# Patient Record
Sex: MALE | Race: WHITE | NOT HISPANIC OR LATINO | Employment: FULL TIME | URBAN - METROPOLITAN AREA
[De-identification: names, ages, dates, MRNs, and addresses within clinical notes are randomized per-mention and may not be internally consistent; named-entity substitution may affect disease eponyms.]

---

## 2017-01-03 DIAGNOSIS — I10 ESSENTIAL HYPERTENSION WITH GOAL BLOOD PRESSURE LESS THAN 140/90: Primary | ICD-10-CM

## 2017-01-03 RX ORDER — AMLODIPINE BESYLATE 5 MG/1
10 TABLET ORAL DAILY
Qty: 30 TABLET | Refills: 0 | Status: SHIPPED | OUTPATIENT
Start: 2017-01-03 | End: 2017-03-23

## 2017-01-03 NOTE — TELEPHONE ENCOUNTER
Amlodipine      Last Written Prescription Date: 12/02/16  Last Fill Quantity: 30, # refills: 1    Last Office Visit with G, P or [facility] prescribing provider:  10/31/16   Future Office Visit:        BP Readings from Last 3 Encounters:   11/30/16 136/86   11/09/16 123/89   10/31/16 132/94

## 2017-01-03 NOTE — Clinical Note
[facility]  [address] [address]  [phone]      January 5, 2017      Horacio Panda  [address] [address]        Horacio     Your medication has been approved for amLODIPine .    However, you are due for a follow up appointment for further refills. Please schedule this visit at your earliest convenience.    [facility]

## 2017-03-23 ENCOUNTER — OFFICE VISIT (OUTPATIENT)
Dept: FAMILY MEDICINE | Facility: CLINIC | Age: 52
End: 2017-03-23
Payer: COMMERCIAL

## 2017-03-23 VITALS
HEART RATE: 87 BPM | WEIGHT: 270 LBS | DIASTOLIC BLOOD PRESSURE: 86 MMHG | TEMPERATURE: 97.3 F | BODY MASS INDEX: 35.62 KG/M2 | SYSTOLIC BLOOD PRESSURE: 134 MMHG

## 2017-03-23 DIAGNOSIS — K21.9 GASTROESOPHAGEAL REFLUX DISEASE WITHOUT ESOPHAGITIS: ICD-10-CM

## 2017-03-23 DIAGNOSIS — E78.5 HYPERLIPIDEMIA LDL GOAL <100: ICD-10-CM

## 2017-03-23 DIAGNOSIS — I10 ESSENTIAL HYPERTENSION WITH GOAL BLOOD PRESSURE LESS THAN 140/90: Primary | ICD-10-CM

## 2017-03-23 DIAGNOSIS — L71.9 ROSACEA: ICD-10-CM

## 2017-03-23 PROCEDURE — 99214 OFFICE O/P EST MOD 30 MIN: CPT | Performed by: PHYSICIAN ASSISTANT

## 2017-03-23 RX ORDER — SIMVASTATIN 10 MG
10 TABLET ORAL AT BEDTIME
Qty: 90 TABLET | Refills: 1 | Status: CANCELLED | OUTPATIENT
Start: 2017-03-23

## 2017-03-23 RX ORDER — AMLODIPINE BESYLATE 10 MG/1
10 TABLET ORAL DAILY
Qty: 90 TABLET | Refills: 1 | Status: SHIPPED | OUTPATIENT
Start: 2017-03-23 | End: 2017-03-23

## 2017-03-23 RX ORDER — LOSARTAN POTASSIUM 100 MG/1
100 TABLET ORAL DAILY
Qty: 90 TABLET | Refills: 1 | Status: SHIPPED | OUTPATIENT
Start: 2017-03-23 | End: 2017-12-08

## 2017-03-23 RX ORDER — PRAVASTATIN SODIUM 20 MG
20 TABLET ORAL DAILY
Qty: 90 TABLET | Refills: 3 | Status: SHIPPED | OUTPATIENT
Start: 2017-03-23 | End: 2018-03-22

## 2017-03-23 RX ORDER — AMLODIPINE BESYLATE 5 MG/1
5 TABLET ORAL DAILY
Qty: 90 TABLET | Refills: 1 | Status: SHIPPED | OUTPATIENT
Start: 2017-03-23 | End: 2017-12-08

## 2017-03-23 NOTE — NURSING NOTE
"Chief Complaint   Patient presents with     Hypertension     Refill Request       Initial BP (!) 153/91  Pulse 87  Temp 97.3  F (36.3  C) (Oral)  Wt 270 lb (122.5 kg)  BMI 35.62 kg/m2 Estimated body mass index is 35.62 kg/(m^2) as calculated from the following:    Height as of 11/9/16: 6' 1\" (1.854 m).    Weight as of this encounter: 270 lb (122.5 kg).  Medication Reconciliation: complete    "

## 2017-03-23 NOTE — MR AVS SNAPSHOT
"              After Visit Summary   3/23/2017    Horacio Panda    MRN: [id number]           Patient Information     Date Of Birth          1965        Visit Information        Provider Department      3/23/2017 1:20 PM Kenton Richmond PA-C [facility] [place]        Today's Diagnoses     Essential hypertension with goal blood pressure less than 140/90    -  1    Hyperlipidemia LDL goal <100        Gastroesophageal reflux disease without esophagitis        Rosacea           Follow-ups after your visit        Who to contact     Normal or non-critical lab and imaging results will be communicated to you by [employer], letter or phone within 4 business days after the clinic has received the results. If you do not hear from us within 7 days, please contact the clinic through [employer] or phone. If you have a critical or abnormal lab result, we will notify you by phone as soon as possible.  Submit refill requests through [employer] or call your pharmacy and they will forward the refill request to us. Please allow 3 business days for your refill to be completed.          If you need to speak with a  for additional information , please call: [phone]             Additional Information About Your Visit        My[employer]Book[employer] Information     [employer] lets you send messages to your doctor, view your test results, renew your prescriptions, schedule appointments and more. To sign up, go to [email or web address] . Click on \"Log in\" on the left side of the screen, which will take you to the Welcome page. Then click on \"Sign up Now\" on the right side of the page.     You will be asked to enter the access code listed below, as well as some personal information. Please follow the directions to create your username and password.     Your access code is: [email or web address]  Expires: 2017  1:54 PM     Your access code will  in 90 days. If you need help or a new code, please call your [place] clinic or " [phone].        Care EveryWhere ID     This is your Care EveryWhere ID. This could be used by other organizations to access your [place] medical records  [id number]        Your Vitals Were     Pulse Temperature BMI (Body Mass Index)             87 97.3  F (36.3  C) (Oral) 35.62 kg/m2          Blood Pressure from Last 3 Encounters:   03/23/17 134/86   11/30/16 136/86   11/09/16 123/89    Weight from Last 3 Encounters:   03/23/17 270 lb (122.5 kg)   11/09/16 267 lb (121.1 kg)   10/31/16 267 lb (121.1 kg)              Today, you had the following     No orders found for display         Today's Medication Changes          These changes are accurate as of: 3/23/17  1:54 PM.  If you have any questions, ask your nurse or doctor.               Start taking these medicines.        Dose/Directions    amLODIPine 5 MG tablet   Commonly known as:  NORVASC   Used for:  Essential hypertension with goal blood pressure less than 140/90   Started by:  Kenton Richmond PA-C        Dose:  5 mg   Take 1 tablet (5 mg) by mouth daily   Quantity:  90 tablet   Refills:  1       metroNIDAZOLE 0.75 % cream   Commonly known as:  METROCREAM   Used for:  Rosacea   Started by:  Kenton Richmond PA-C        Apply topically 2 times daily   Quantity:  45 g   Refills:  11       pravastatin 20 MG tablet   Commonly known as:  PRAVACHOL   Used for:  Hyperlipidemia LDL goal <100   Started by:  Kenton Richmond PA-C        Dose:  20 mg   Take 1 tablet (20 mg) by mouth daily   Quantity:  90 tablet   Refills:  3       ranitidine 300 MG tablet   Commonly known as:  ZANTAC   Used for:  Gastroesophageal reflux disease without esophagitis   Started by:  Kenton Richmond PA-C        Dose:  300 mg   Take 1 tablet (300 mg) by mouth At Bedtime   Quantity:  60 tablet   Refills:  1         Stop taking these medicines if you haven't already. Please contact your care team if you have questions.     simvastatin 10 MG tablet   Commonly known as:  ZOCOR    Stopped by:  Kenton Richmond PA-C                Where to get your medicines      These medications were sent to [id number] - [place], [place] - [address]  [address] [place] [address]     Phone:  [phone]     amLODIPine 5 MG tablet    losartan 100 MG tablet    metroNIDAZOLE 0.75 % cream    pravastatin 20 MG tablet    ranitidine 300 MG tablet                Primary Care Provider Office Phone # Fax #    Kenton Richmond PA-C [phone]       [facility] [place] [address]  [place] [address]        Thank you!     Thank you for choosing [facility]  for your care. Our goal is always to provide you with excellent care. Hearing back from our patients is one way we can continue to improve our services. Please take a few minutes to complete the written survey that you may receive in the mail after your visit with us. Thank you!             Your Updated Medication List - Protect others around you: Learn how to safely use, store and throw away your medicines at www.disposemymeds.org.          This list is accurate as of: 3/23/17  1:54 PM.  Always use your most recent med list.                   Brand Name Dispense Instructions for use    amLODIPine 5 MG tablet    NORVASC    90 tablet    Take 1 tablet (5 mg) by mouth daily       losartan 100 MG tablet    COZAAR    90 tablet    Take 1 tablet (100 mg) by mouth daily       metroNIDAZOLE 0.75 % cream    METROCREAM    45 g    Apply topically 2 times daily       pravastatin 20 MG tablet    PRAVACHOL    90 tablet    Take 1 tablet (20 mg) by mouth daily       ranitidine 300 MG tablet    ZANTAC    60 tablet    Take 1 tablet (300 mg) by mouth At Bedtime

## 2017-03-23 NOTE — PROGRESS NOTES
SUBJECTIVE:                                                    Horacio Panda is a 51 year old male who presents to clinic today for the following health issues:      Hyperlipidemia Follow-Up      Rate your low fat/cholesterol diet?: good    Taking statin?  Yes, possible muscle aches from statin    Other lipid medications/supplements?:  none     Hypertension Follow-up      Outpatient blood pressures are being checked at pharmacy.  Results are slightly elevated.    Low Salt Diet: no added salt       Amount of exercise or physical activity: walks     Problems taking medications regularly: No    Medication side effects: patient is unsure    Diet: regular (no restrictions)      Ran out of amlodipine several wks ago.     Problem list and histories reviewed & adjusted, as indicated.  Additional history: as documented    Patient Active Problem List   Diagnosis     Status post hip replacement     CARDIOVASCULAR SCREENING; LDL GOAL LESS THAN 130     Hyperlipidemia LDL goal <130     Essential hypertension with goal blood pressure less than 140/90     Hyperplastic colon polyp     Gastroesophageal reflux disease without esophagitis     Past Surgical History:   Procedure Laterality Date     C ARTHROPLASTY I-P JT  Age 26    Lft Hip       Social History   Substance Use Topics     Smoking status: Never Smoker     Smokeless tobacco: Never Used     Alcohol use Yes      Comment: occ     Family History   Problem Relation Age of Onset     Hypertension Mother      CANCER Father      Hypertension Sister      CANCER Maternal Grandfather      CEREBROVASCULAR DISEASE Paternal Grandmother      HEART DISEASE Paternal Grandfather          BP Readings from Last 3 Encounters:   03/23/17 134/86   11/30/16 136/86   11/09/16 123/89    Wt Readings from Last 3 Encounters:   03/23/17 270 lb (122.5 kg)   11/09/16 267 lb (121.1 kg)   10/31/16 267 lb (121.1 kg)             gerd symptoms with occasional tiffany abd pain and n/v.   Recheck of rosacea          Reviewed and updated as needed this visit by clinical staff  Tobacco  Allergies  Meds  Med Hx  Surg Hx  Fam Hx  Soc Hx      Reviewed and updated as needed this visit by Provider  Tobacco           All other systems negative except as outline above    OBJECTIVE:  Eye exam - right eye normal lid, conjunctiva, cornea, pupil and fundus, left eye normal lid, conjunctiva, cornea, pupil and fundus.  Thyroid not palpable, not enlarged, no nodules detected.  CHEST:chest clear to IPPA, no tachypnea, retractions or cyanosis and S1, S2 normal, no murmur, no gallop, rate regular.  The abdomen is soft without tenderness, guarding, mass, rebound or organomegaly. Bowel sounds are normal. No CVA tenderness or inguinal adenopathy noted.  Horacio was seen today for hypertension and refill request.    Diagnoses and all orders for this visit:    Gastroesophageal reflux disease without esophagitis  -     ranitidine (ZANTAC) 300 MG tablet; Take 1 tablet (300 mg) by mouth At Bedtime    Hyperlipidemia LDL goal <100  -     pravastatin (PRAVACHOL) 20 MG tablet; Take 1 tablet (20 mg) by mouth daily    Essential hypertension with goal blood pressure less than 140/90  -     losartan (COZAAR) 100 MG tablet; Take 1 tablet (100 mg) by mouth daily  -     amLODIPine (NORVASC) 5 MG tablet; Take 1 tablet (10 mg) by mouth daily    Other orders  -     Cancel: simvastatin (ZOCOR) 10 MG tablet; Take 1 tablet (10 mg) by mouth At Bedtime      work on lifestyle modification  Recheck blood pressure in 1 mos .

## 2017-04-17 DIAGNOSIS — M10.9 ACUTE GOUTY ARTHRITIS: Primary | ICD-10-CM

## 2017-04-17 NOTE — TELEPHONE ENCOUNTER
Patient requesting medication for the pain in his foot. This is a reoccurring problem and states provider is aware of it. Please advise

## 2017-04-17 NOTE — TELEPHONE ENCOUNTER
He was seen in October for this and was given medrol dose pack , do you want to try it again or have him seen?  Med teed up

## 2017-04-18 ENCOUNTER — TELEPHONE (OUTPATIENT)
Dept: FAMILY MEDICINE | Facility: CLINIC | Age: 52
End: 2017-04-18

## 2017-04-18 ENCOUNTER — RADIANT APPOINTMENT (OUTPATIENT)
Dept: GENERAL RADIOLOGY | Facility: CLINIC | Age: 52
End: 2017-04-18
Attending: NURSE PRACTITIONER
Payer: COMMERCIAL

## 2017-04-18 ENCOUNTER — OFFICE VISIT (OUTPATIENT)
Dept: FAMILY MEDICINE | Facility: CLINIC | Age: 52
End: 2017-04-18
Payer: COMMERCIAL

## 2017-04-18 VITALS
WEIGHT: 267.4 LBS | HEART RATE: 95 BPM | DIASTOLIC BLOOD PRESSURE: 107 MMHG | BODY MASS INDEX: 34.32 KG/M2 | HEIGHT: 74 IN | TEMPERATURE: 98.7 F | OXYGEN SATURATION: 99 % | SYSTOLIC BLOOD PRESSURE: 161 MMHG

## 2017-04-18 DIAGNOSIS — M25.571 ACUTE RIGHT ANKLE PAIN: Primary | ICD-10-CM

## 2017-04-18 DIAGNOSIS — M25.571 ACUTE RIGHT ANKLE PAIN: ICD-10-CM

## 2017-04-18 LAB — URATE SERPL-MCNC: 6.6 MG/DL (ref 3.5–7.2)

## 2017-04-18 PROCEDURE — 36415 COLL VENOUS BLD VENIPUNCTURE: CPT | Performed by: NURSE PRACTITIONER

## 2017-04-18 PROCEDURE — 86038 ANTINUCLEAR ANTIBODIES: CPT | Performed by: NURSE PRACTITIONER

## 2017-04-18 PROCEDURE — 73610 X-RAY EXAM OF ANKLE: CPT | Mod: RT

## 2017-04-18 PROCEDURE — 99214 OFFICE O/P EST MOD 30 MIN: CPT | Performed by: NURSE PRACTITIONER

## 2017-04-18 PROCEDURE — 84550 ASSAY OF BLOOD/URIC ACID: CPT | Performed by: NURSE PRACTITIONER

## 2017-04-18 PROCEDURE — 86431 RHEUMATOID FACTOR QUANT: CPT | Performed by: NURSE PRACTITIONER

## 2017-04-18 RX ORDER — PREDNISONE 20 MG/1
40 TABLET ORAL DAILY
Qty: 14 TABLET | Refills: 0 | Status: SHIPPED | OUTPATIENT
Start: 2017-04-18 | End: 2017-04-25

## 2017-04-18 RX ORDER — METHYLPREDNISOLONE 4 MG
TABLET, DOSE PACK ORAL
Qty: 21 TABLET | Refills: 0 | Status: SHIPPED | OUTPATIENT
Start: 2017-04-18 | End: 2017-09-27

## 2017-04-18 NOTE — PATIENT INSTRUCTIONS
Suspected gout.  I will let you know your lab results.  I would make an appt with rheumatology due to your ongoing symptoms. Follow up as needed. Prednisone for resolution of symptoms.     Eating to Prevent Gout  Gout is a painful form of arthritis caused by an excess of uric acid. This is a waste product made by the body. It builds up in the body and forms crystals that collect in the joints, bringing on a gout attack. Alcohol and certain foods can trigger a gout attack. Below are some guidelines for changing your diet to help you manage gout. Your healthcare provider can work with you to determine the best eating plan for you. Know that diet is only one part of managing gout. Take your medicines as prescribed and follow the other guidelines your healthcare provider has given you.  Foods to limit  Eating too many foods containing purines may increase the levels of uric acid in your body and increase your risk for a gout attack. It may be best to limit these high-purine foods:    Alcohol (beer, red wine). You may be told to avoid alcohol completely.    Certain fish (anchovies, sardines, fish roes, herring, tuna, mussels, codfish, scallops, trout, and jessica)    Certain meats (red meat, processed meat, barron, turkey, wild game, and goose)    Sauces and gravies made with meat    Organ meats (such as liver, kidneys, sweetbreads, and tripe)    Legumes (such as dried beans, peas)    Mushrooms, spinach, asparagus, and cauliflower    Yeast and yeast extract supplements  Foods to try  Some foods may be helpful for people with gout. You may want to try adding some of the following foods to your diet:    Dark berries: These include blueberries, blackberries, and cherries. These berries contain chemicals that may lower uric acid.    Tofu: Tofu, which is made from soy, is a good source of protein. Studies have shown that it may be a better choice than meat for people with gout.    Omega fatty acids: These acids are found in  fatty fish (such as salmon), certain oils (such as flax, olive, or nut oils), or nuts. They may help prevent inflammation due to gout.  The following guidelines are recommended by the American Medical Association for people with gout. Your diet should be:    High in fiber, whole grains, fruits, and vegetables.    Low in protein (15% of calories should come from protein. Choose lean sources such as soy, lean meats, and poultry).    Low in fat (no more than 30% of calories should come from fat, with only 10% coming from animal fat).     4658-8543 "[employer]. [address] [place] [address]. All rights reserved. This information is not intended as a substitute for professional medical care. Always follow your healthcare professional's instructions.        Treating Gout Attacks  Gout is a disease that affects the joints. Left untreated, it can lead to painful foot deformity and even kidney problems. But, by treating gout early, you can relieve pain and help prevent future problems. Gout can usually be treated with medication and proper diet. In severe cases, surgery may be needed.  Gout attacks are painful and often happen more than once. Taking medications may reduce pain and prevent attacks in the future. There are also some things you can do at home to relieve symptoms.    Medications for Gout  Your health care provider may prescribe a daily medication to reduce levels of uric acid. This may help prevent gout attacks. Other medications can help relieve pain and swelling during an attack. Be sure to take your medication as directed.  What You Can Do  Below are some things you can do at home to relieve gout symptoms. Your health care provider may have other tips.    Rest the painful joint as much as you can.    Raise the painful joint so it is at a level higher than your heart.  How Can I Prevent Gout?  With a little effort, you may be able to prevent gout attacks in the future. Here are some  things you can do:    Avoid alcohol and foods that trigger gout.    Avoid foods high in purines    Certain meats (red meat, processed meat, turkey)    Organ meats (kidney, liver, sweetbread)    Shellfish (lobster, crab, shrimp, scallop, mussel)    Certain fish (anchovy, sardine, herring, mackerel)    Take any medications prescribed by your health care provider.    Lose weight if you need to.    Control blood pressure and cholesterol.    Drink plenty of water to help flush uric acid from your body.    7615-5036 [employer]. [address] [place] [address]. All rights reserved. This information is not intended as a substitute for professional medical care. Always follow your healthcare professional's instructions.        Gout    Gout or is an inflammation of a joint due to a build-up of gout crystals in the joint fluid. This occurs when there is an excess of uric acid (a normal waste product) in the body. Uric acid builds up in the body when the kidneys are unable to filter enough of it from the blood. This may occur with age. It is also associated with kidney disease. Gout occurs more often in persons with obesity, diabetes, hypertension, or high levels of fats in the blood. It may be run in families. Gout tends to come and go. A flare up of gout is called an attack. Drinking alcohol or eating certain foods (such as shellfish or foods with additives such as high-fructose corn syrup) may increase uric acid levels in the blood and cause a gout attack.  During a gout attack, the affected joint may become a hot, red, swollen and painful. If you have had one attack of gout, you are likely to have another. An attack of gout can be treated with medicine. If these attacks become frequent, a daily medicine may be prescribed to help the kidneys remove uric acid from the body.  Home care  During a gout attack:    Rest painful joints. If gout affects the joints of your foot or leg, you may want to use  crutches for the first few days to keep from bearing weight on the affected joint.    When sitting or lying down, raise the painful joint to a level higher than your heart.    Apply an ice pack (ice cubes in a plastic bag wrapped in a thin towel) over the injured area for 20 minutes every 1-2 hours the first day for pain relief. Continue this 3-4 times a day for swelling and pain.    Avoid alcohol and foods listed below (see Preventing attacks) during a gout attack. Drink extra fluid to help flush the uric acid through your kidneys.    If you were prescribed a medication to treat gout, take it as your healthcare provider has instructed. Don't skip doses.    Take anti-inflammatory medicine as directed.     If pain medicines have been prescribed, take them exactly as directed.    Preventing attacks    Minimize or avoid alcohol use. Excess alcohol intake can cause a gout attack.    Limit these foods and beverages:    Organ meats, such as kidneys and liver    Certain seafoods (anchovies, sardines, shrimp, scallops, herring, mackerel)    Wild game, meat extracts and meat gravies    Foods and beverages sweetened with high-fructose corn syrup, such as sodas    Eat a healthy diet including low-fat and nonfat dairy, whole grains, and vegetables.    If you are overweight, talk to your healthcare provider about a weight reduction plan. Avoid fasting or extreme low calorie diets (less than 900 calories per day). This will increase uric acid levels in the body.    If you have diabetes or high blood pressure, work with your doctor to manage these conditions.    Protect the joint from injury. Trauma can trigger a gout attack.  Follow-up care  Follow up with your healthcare provider or as advised.   When to seek medical advice  Call your healthcare provider if you have any of the following:    Fever over 100.4 F (38. C) with worsening joint pain    Increasing redness around the joint    Pain developing in another joint    Repeated  vomiting, abdominal pain, or blood in the vomit or stool (black or red color)    6264-8952 [employer]. [address] [place] [address], [place] [address]. All rights reserved. This information is not intended as a substitute for professional medical care. Always follow your healthcare professional's instructions.

## 2017-04-18 NOTE — TELEPHONE ENCOUNTER
savannah is due for a recheck. Have him make an appt to see me for a recheck of his blood pressure

## 2017-04-18 NOTE — TELEPHONE ENCOUNTER
LVM for patient to return call to clinic. Per PCP patient is due for a medication follow up. Please assist with scheduling.      Yelitza Jeong CMA

## 2017-04-18 NOTE — NURSING NOTE
"Chief Complaint   Patient presents with     Musculoskeletal Problem       Initial BP (!) 161/107  Pulse 95  Temp 98.7  F (37.1  C) (Oral)  Ht 6' 1.5\" (1.867 m)  Wt 267 lb 6.4 oz (121.3 kg)  SpO2 99%  BMI 34.8 kg/m2 Estimated body mass index is 34.8 kg/(m^2) as calculated from the following:    Height as of this encounter: 6' 1.5\" (1.867 m).    Weight as of this encounter: 267 lb 6.4 oz (121.3 kg).  Medication Reconciliation: complete     Sabi George MA  "

## 2017-04-18 NOTE — MR AVS SNAPSHOT
After Visit Summary   4/18/2017    Horacio Panda    MRN: [id number]           Patient Information     Date Of Birth          1965        Visit Information        Provider Department      4/18/2017 2:20 PM Frances Kiser NP [facility]        Today's Diagnoses     Acute right ankle pain    -  1      Care Instructions    Suspected gout.  I will let you know your lab results.  I would make an appt with rheumatology due to your ongoing symptoms. Follow up as needed. Prednisone for resolution of symptoms.     Eating to Prevent Gout  Gout is a painful form of arthritis caused by an excess of uric acid. This is a waste product made by the body. It builds up in the body and forms crystals that collect in the joints, bringing on a gout attack. Alcohol and certain foods can trigger a gout attack. Below are some guidelines for changing your diet to help you manage gout. Your healthcare provider can work with you to determine the best eating plan for you. Know that diet is only one part of managing gout. Take your medicines as prescribed and follow the other guidelines your healthcare provider has given you.  Foods to limit  Eating too many foods containing purines may increase the levels of uric acid in your body and increase your risk for a gout attack. It may be best to limit these high-purine foods:    Alcohol (beer, red wine). You may be told to avoid alcohol completely.    Certain fish (anchovies, sardines, fish roes, herring, tuna, mussels, codfish, scallops, trout, and jessica)    Certain meats (red meat, processed meat, barron, turkey, wild game, and goose)    Sauces and gravies made with meat    Organ meats (such as liver, kidneys, sweetbreads, and tripe)    Legumes (such as dried beans, peas)    Mushrooms, spinach, asparagus, and cauliflower    Yeast and yeast extract supplements  Foods to try  Some foods may be helpful for people with gout. You may want to try adding some of the  following foods to your diet:    Dark berries: These include blueberries, blackberries, and cherries. These berries contain chemicals that may lower uric acid.    Tofu: Tofu, which is made from soy, is a good source of protein. Studies have shown that it may be a better choice than meat for people with gout.    Omega fatty acids: These acids are found in fatty fish (such as salmon), certain oils (such as flax, olive, or nut oils), or nuts. They may help prevent inflammation due to gout.  The following guidelines are recommended by the American Medical Association for people with gout. Your diet should be:    High in fiber, whole grains, fruits, and vegetables.    Low in protein (15% of calories should come from protein. Choose lean sources such as soy, lean meats, and poultry).    Low in fat (no more than 30% of calories should come from fat, with only 10% coming from animal fat).     5136-7977 [employer]. [address] [place] [address]. All rights reserved. This information is not intended as a substitute for professional medical care. Always follow your healthcare professional's instructions.        Treating Gout Attacks  Gout is a disease that affects the joints. Left untreated, it can lead to painful foot deformity and even kidney problems. But, by treating gout early, you can relieve pain and help prevent future problems. Gout can usually be treated with medication and proper diet. In severe cases, surgery may be needed.  Gout attacks are painful and often happen more than once. Taking medications may reduce pain and prevent attacks in the future. There are also some things you can do at home to relieve symptoms.    Medications for Gout  Your health care provider may prescribe a daily medication to reduce levels of uric acid. This may help prevent gout attacks. Other medications can help relieve pain and swelling during an attack. Be sure to take your medication as directed.  What You  Can Do  Below are some things you can do at home to relieve gout symptoms. Your health care provider may have other tips.    Rest the painful joint as much as you can.    Raise the painful joint so it is at a level higher than your heart.  How Can I Prevent Gout?  With a little effort, you may be able to prevent gout attacks in the future. Here are some things you can do:    Avoid alcohol and foods that trigger gout.    Avoid foods high in purines    Certain meats (red meat, processed meat, turkey)    Organ meats (kidney, liver, sweetbread)    Shellfish (lobster, crab, shrimp, scallop, mussel)    Certain fish (anchovy, sardine, herring, mackerel)    Take any medications prescribed by your health care provider.    Lose weight if you need to.    Control blood pressure and cholesterol.    Drink plenty of water to help flush uric acid from your body.    5367-4037 [employer]. [address] [place] [address]. All rights reserved. This information is not intended as a substitute for professional medical care. Always follow your healthcare professional's instructions.        Gout    Gout or is an inflammation of a joint due to a build-up of gout crystals in the joint fluid. This occurs when there is an excess of uric acid (a normal waste product) in the body. Uric acid builds up in the body when the kidneys are unable to filter enough of it from the blood. This may occur with age. It is also associated with kidney disease. Gout occurs more often in persons with obesity, diabetes, hypertension, or high levels of fats in the blood. It may be run in families. Gout tends to come and go. A flare up of gout is called an attack. Drinking alcohol or eating certain foods (such as shellfish or foods with additives such as high-fructose corn syrup) may increase uric acid levels in the blood and cause a gout attack.  During a gout attack, the affected joint may become a hot, red, swollen and painful. If you  have had one attack of gout, you are likely to have another. An attack of gout can be treated with medicine. If these attacks become frequent, a daily medicine may be prescribed to help the kidneys remove uric acid from the body.  Home care  During a gout attack:    Rest painful joints. If gout affects the joints of your foot or leg, you may want to use crutches for the first few days to keep from bearing weight on the affected joint.    When sitting or lying down, raise the painful joint to a level higher than your heart.    Apply an ice pack (ice cubes in a plastic bag wrapped in a thin towel) over the injured area for 20 minutes every 1-2 hours the first day for pain relief. Continue this 3-4 times a day for swelling and pain.    Avoid alcohol and foods listed below (see Preventing attacks) during a gout attack. Drink extra fluid to help flush the uric acid through your kidneys.    If you were prescribed a medication to treat gout, take it as your healthcare provider has instructed. Don't skip doses.    Take anti-inflammatory medicine as directed.     If pain medicines have been prescribed, take them exactly as directed.    Preventing attacks    Minimize or avoid alcohol use. Excess alcohol intake can cause a gout attack.    Limit these foods and beverages:    Organ meats, such as kidneys and liver    Certain seafoods (anchovies, sardines, shrimp, scallops, herring, mackerel)    Wild game, meat extracts and meat gravies    Foods and beverages sweetened with high-fructose corn syrup, such as sodas    Eat a healthy diet including low-fat and nonfat dairy, whole grains, and vegetables.    If you are overweight, talk to your healthcare provider about a weight reduction plan. Avoid fasting or extreme low calorie diets (less than 900 calories per day). This will increase uric acid levels in the body.    If you have diabetes or high blood pressure, work with your doctor to manage these conditions.    Protect the joint  from injury. Trauma can trigger a gout attack.  Follow-up care  Follow up with your healthcare provider or as advised.   When to seek medical advice  Call your healthcare provider if you have any of the following:    Fever over 100.4 F (38. C) with worsening joint pain    Increasing redness around the joint    Pain developing in another joint    Repeated vomiting, abdominal pain, or blood in the vomit or stool (black or red color)    2479-0246 [employer]. [address] [place] [address]. All rights reserved. This information is not intended as a substitute for professional medical care. Always follow your healthcare professional's instructions.              Follow-ups after your visit        Additional Services     RHEUMATOLOGY REFERRAL       Your provider has referred you to: [facility]: [place] [facility] [phone]   [email or web address]  [facility]: [place] [facility] [phone]   [email or web address]  [facility]: [place] [facility] [phone]   [email or web address]  [facility]: [facility] [phone]   [email or web address]    Please be aware that coverage of these services is subject to the terms and limitations of your health insurance plan.  Call member services at your health plan with any benefit or coverage questions.      Please bring the following with you to your appointment:    (1) Any X-Rays, CTs or MRIs which have been performed.  Contact the facility where they were done to arrange for  prior to your scheduled appointment.    (2) List of current medications   (3) This referral request   (4) Any documents/labs given to you for this referral                  Follow-up notes from your care team     Return if symptoms worsen or fail to improve.      Who to contact     Normal or  "non-critical lab and imaging results will be communicated to you by MyChart, letter or phone within 4 business days after the clinic has received the results. If you do not hear from us within 7 days, please contact the clinic through [employer] or phone. If you have a critical or abnormal lab result, we will notify you by phone as soon as possible.  Submit refill requests through [employer] or call your pharmacy and they will forward the refill request to us. Please allow 3 business days for your refill to be completed.          If you need to speak with a  for additional information , please call: [phone]             Additional Information About Your Visit        [employer] Information     [employer] gives you secure access to your electronic health record. If you see a primary care provider, you can also send messages to your care team and make appointments. If you have questions, please call your primary care clinic.  If you do not have a primary care provider, please call [phone] and they will assist you.        Care EveryWhere ID     This is your Care EveryWhere ID. This could be used by other organizations to access your [facility] medical records  [id number]        Your Vitals Were     Pulse Temperature Height Pulse Oximetry BMI (Body Mass Index)       95 98.7  F (37.1  C) (Oral) 6' 1.5\" (1.867 m) 99% 34.8 kg/m2        Blood Pressure from Last 3 Encounters:   04/18/17 (!) 161/107   03/23/17 134/86   11/30/16 136/86    Weight from Last 3 Encounters:   04/18/17 267 lb 6.4 oz (121.3 kg)   03/23/17 270 lb (122.5 kg)   11/09/16 267 lb (121.1 kg)              We Performed the Following     Antinuclear antibody screen by EIA     Rheumatoid factor     RHEUMATOLOGY REFERRAL     Uric acid          Today's Medication Changes          These changes are accurate as of: 4/18/17  2:48 PM.  If you have any questions, ask your nurse or doctor.               Start taking these medicines.        Dose/Directions "    predniSONE 20 MG tablet   Commonly known as:  DELTASONE   Used for:  Acute right ankle pain   Started by:  Frances Kiser NP        Dose:  40 mg   Take 2 tablets (40 mg) by mouth daily for 7 days   Quantity:  14 tablet   Refills:  0            Where to get your medicines      These medications were sent to [id number] - [place], [place] - [address]  [address] [place] [address]     Phone:  [phone]     predniSONE 20 MG tablet                Primary Care Provider Office Phone # Fax #    Kenton Richmond PA-C [phone]       [facility] [place] [address]  [place] [address]        Thank you!     Thank you for choosing [facility]  for your care. Our goal is always to provide you with excellent care. Hearing back from our patients is one way we can continue to improve our services. Please take a few minutes to complete the written survey that you may receive in the mail after your visit with us. Thank you!             Your Updated Medication List - Protect others around you: Learn how to safely use, store and throw away your medicines at www.disposemymeds.org.          This list is accurate as of: 4/18/17  2:48 PM.  Always use your most recent med list.                   Brand Name Dispense Instructions for use    amLODIPine 5 MG tablet    NORVASC    90 tablet    Take 1 tablet (5 mg) by mouth daily       losartan 100 MG tablet    COZAAR    90 tablet    Take 1 tablet (100 mg) by mouth daily       methylPREDNISolone 4 MG tablet    MEDROL DOSEPAK    21 tablet    Follow package instructions       metroNIDAZOLE 0.75 % cream    METROCREAM    45 g    Apply topically 2 times daily       pravastatin 20 MG tablet    PRAVACHOL    90 tablet    Take 1 tablet (20 mg) by mouth daily       predniSONE 20 MG tablet    DELTASONE    14 tablet    Take 2 tablets (40 mg) by mouth daily for 7 days       ranitidine 300 MG tablet    ZANTAC    60 tablet    Take 1 tablet  (300 mg) by mouth At Bedtime

## 2017-04-18 NOTE — TELEPHONE ENCOUNTER
Spouse is calling stating patient was seen this morning and would like to know which RX she needs to  at her pharmacy for patient. Please call to advise. Thank  You.

## 2017-04-18 NOTE — PROGRESS NOTES
SUBJECTIVE:                                                    Horacio Panda is a 51 year old male who presents to clinic today for the following health issues:      Muscle/Joint Pain     Onset: Wednesday    Description:   Location: right foot  Character: Sharp, Dull ache, Stabbing, Gnawing, Burning and Cramping    Progression of Symptoms: worse    Accompanying Signs & Symptoms:  Other symptoms: swelling and redness, bruising in the heal, hot to touch    Precipitating factors:   Trauma or overuse: no     Alleviating factors:  Improved by: elevation  Therapies Tried and outcome: elevation, aleve   Hx of other joints swelling like this in the past, they had thought it was gout, but not confirmed- per pt.  Has resolved with prednisone in the past.          Problem list and histories reviewed & adjusted, as indicated.  Additional history: as documented    Patient Active Problem List   Diagnosis     Status post hip replacement     CARDIOVASCULAR SCREENING; LDL GOAL LESS THAN 130     Hyperlipidemia LDL goal <130     Essential hypertension with goal blood pressure less than 140/90     Hyperplastic colon polyp     Gastroesophageal reflux disease without esophagitis     Past Surgical History:   Procedure Laterality Date     C ARTHROPLASTY I-P JT  Age 26    Lft Hip       Social History   Substance Use Topics     Smoking status: Never Smoker     Smokeless tobacco: Never Used     Alcohol use Yes      Comment: occ     Family History   Problem Relation Age of Onset     Hypertension Mother      CANCER Father      Hypertension Sister      CANCER Maternal Grandfather      CEREBROVASCULAR DISEASE Paternal Grandmother      HEART DISEASE Paternal Grandfather          Current Outpatient Prescriptions   Medication Sig Dispense Refill     methylPREDNISolone (MEDROL DOSEPAK) 4 MG tablet Follow package instructions 21 tablet 0     predniSONE (DELTASONE) 20 MG tablet Take 2 tablets (40 mg) by mouth daily for 7 days 14 tablet 0     losartan  "(COZAAR) 100 MG tablet Take 1 tablet (100 mg) by mouth daily 90 tablet 1     pravastatin (PRAVACHOL) 20 MG tablet Take 1 tablet (20 mg) by mouth daily 90 tablet 3     ranitidine (ZANTAC) 300 MG tablet Take 1 tablet (300 mg) by mouth At Bedtime 60 tablet 1     amLODIPine (NORVASC) 5 MG tablet Take 1 tablet (5 mg) by mouth daily 90 tablet 1     metroNIDAZOLE (METROCREAM) 0.75 % cream Apply topically 2 times daily 45 g 11     No Known Allergies  BP Readings from Last 3 Encounters:   04/18/17 (P) 122/84   03/23/17 134/86   11/30/16 136/86    Wt Readings from Last 3 Encounters:   04/18/17 267 lb 6.4 oz (121.3 kg)   03/23/17 270 lb (122.5 kg)   11/09/16 267 lb (121.1 kg)            Labs reviewed in EPIC    Reviewed and updated as needed this visit by clinical staff       Reviewed and updated as needed this visit by Provider         ROS:  Constitutional, HEENT, cardiovascular, pulmonary, GI, , musculoskeletal, neuro, skin, endocrine and psych systems are negative, except as otherwise noted.    OBJECTIVE:                                                    BP (P) 122/84  Pulse 95  Temp 98.7  F (37.1  C) (Oral)  Ht 6' 1.5\" (1.867 m)  Wt 267 lb 6.4 oz (121.3 kg)  SpO2 99%  BMI 34.8 kg/m2  Body mass index is 34.8 kg/(m^2).  GENERAL: healthy, alert and no distress  RESP: lungs clear to auscultation - no rales, rhonchi or wheezes  CV: regular rate and rhythm, normal S1 S2, no S3 or S4, no murmur, click or rub, no peripheral edema and peripheral pulses strong  MS: no gross musculoskeletal defects noted POSITIVE for edema, erythema to R ankle. No pain with palpation.   SKIN: no suspicious lesions or rashes  NEURO: A&O, mentation intact and speech normal    Diagnostic Test Results:  See orders     ASSESSMENT/PLAN:                                                          ICD-10-CM    1. Acute right ankle pain M25.571 XR Ankle Right G/E 3 Views     Uric acid     Rheumatoid factor     Antinuclear antibody screen by EIA     " predniSONE (DELTASONE) 20 MG tablet     RHEUMATOLOGY REFERRAL    swelling; suspected gout; recurrent- multiple joints in past       See Patient Instructions: Suspected gout.  I will let you know your lab results.  I would make an appt with rheumatology due to your ongoing symptoms. Follow up as needed. Prednisone for resolution of symptoms.       Frances Kiser, RAZ  [facility]

## 2017-04-19 LAB
ANA SER QL IA: NORMAL
RHEUMATOID FACT SER NEPH-ACNC: <20 IU/ML (ref 0–20)

## 2017-04-20 NOTE — PROGRESS NOTES
Nigel Leonard,    Thank you for your recent office visit.    Here are your recent results.  All labs are normal. Please follow up if your symptoms persist or worsen.  Please schedule with rheumatology as we discussed for further evaluation of your symptoms.     Feel free to contact me via [employer] or call the clinic at [phone].    Sincerely,    Frances Kiser, KALINA, FNP-BC

## 2017-04-21 NOTE — TELEPHONE ENCOUNTER
PA for metroNIDAZOLE (METROCREAM) 0.75 % cream approved x 1 year, pharmacy informed. Approval sent to scanning.

## 2017-09-14 DIAGNOSIS — K21.9 GASTROESOPHAGEAL REFLUX DISEASE WITHOUT ESOPHAGITIS: ICD-10-CM

## 2017-09-14 NOTE — TELEPHONE ENCOUNTER
Ranitidine      Last Written Prescription Date: 05/22/17  Last Fill Quantity: 60,  # refills: 0   Last Office Visit with [facility], [facility] or [facility] prescribing provider: 04/18/17

## 2017-09-26 ENCOUNTER — TELEPHONE (OUTPATIENT)
Dept: FAMILY MEDICINE | Facility: CLINIC | Age: 52
End: 2017-09-26

## 2017-09-26 DIAGNOSIS — M10.9 ACUTE GOUTY ARTHRITIS: ICD-10-CM

## 2017-09-26 NOTE — TELEPHONE ENCOUNTER
Spouse calling states patient is having an inflammation flare up with swelling in L ankle. Declined appt would like a script called in for an oral steroid, please call to discuss.

## 2017-09-26 NOTE — TELEPHONE ENCOUNTER
Left message on identified voice mail for patient to call clinic. [phone].  Julienne Krause RN

## 2017-09-27 NOTE — TELEPHONE ENCOUNTER
Left message on voice mail for patient to call clinic. [phone]  Med pended for provider.  Will send to provider to advise in the meantime.  Please see below.

## 2017-09-30 RX ORDER — METHYLPREDNISOLONE 4 MG
TABLET, DOSE PACK ORAL
Qty: 21 TABLET | Refills: 0 | Status: SHIPPED | OUTPATIENT
Start: 2017-09-30 | End: 2018-03-27

## 2017-10-18 ENCOUNTER — TELEPHONE (OUTPATIENT)
Dept: FAMILY MEDICINE | Facility: CLINIC | Age: 52
End: 2017-10-18

## 2017-10-18 NOTE — TELEPHONE ENCOUNTER
Panel Management Review        Summary:    Patient is due/failing the following:   Lab draw and BP CHECK    Action needed:   Patient needs office visit for blood pressure monitoring with ANC nurse and blood work.    Type of outreach:    Phone, left message for patient to call back.     Questions for provider review:    None                                                                                                                                    Mack Villa MA       Chart routed to Care Team .

## 2017-10-18 NOTE — LETTER
November 2, 2017      Horacio Panda  [address] [address]        Dear Horacio,       This is a friendly reminder that you are due for a blood pressure recheck as it was elevated at your last visit.     Your health is important to us.  We periodically review your chart to make sure that you are up-to-date on all health maintenance and that your chronic illnesses are adequately controlled.       You can schedule a appointment with the nurse in the clinic-this is a no charge visit. Or do a walk in appointment with the pharmacy.     [facility]       You are also due for a fasting cholesterol check. Please schedule a lab only appointment.     Sincerely,        Kenton Richmond PA-C

## 2017-12-08 DIAGNOSIS — I10 ESSENTIAL HYPERTENSION WITH GOAL BLOOD PRESSURE LESS THAN 140/90: ICD-10-CM

## 2017-12-08 RX ORDER — LOSARTAN POTASSIUM 100 MG/1
TABLET ORAL
Qty: 30 TABLET | Refills: 0 | Status: SHIPPED | OUTPATIENT
Start: 2017-12-08 | End: 2018-03-22

## 2017-12-08 RX ORDER — AMLODIPINE BESYLATE 5 MG/1
TABLET ORAL
Qty: 30 TABLET | Refills: 0 | Status: SHIPPED | OUTPATIENT
Start: 2017-12-08 | End: 2018-03-22

## 2017-12-08 NOTE — TELEPHONE ENCOUNTER
savannah is due for a recheck. Have him make an appt to see me for a physical and fasting lab work

## 2017-12-08 NOTE — TELEPHONE ENCOUNTER
Routing refill request to provider for review/approval because:  Labs out of range:  Pended for approval.  Julienne Krause RN

## 2017-12-08 NOTE — LETTER
December 14, 2017        Horacio Panda  [address] [address]      Dear Horacio,    Your medication has been approved for losartan (COZAAR) 100 MG tablet, and amlodipine (NORVASC) 5 MG tablet for one month only.    However, you are due for a follow up appointment for further refills. Please schedule physical and fasting lab work at your earliest convenience.    Thank you.      Kenton [place]'s Care Team

## 2018-03-22 ENCOUNTER — MYC MEDICAL ADVICE (OUTPATIENT)
Dept: FAMILY MEDICINE | Facility: CLINIC | Age: 53
End: 2018-03-22

## 2018-03-22 DIAGNOSIS — E78.5 HYPERLIPIDEMIA LDL GOAL <100: ICD-10-CM

## 2018-03-22 DIAGNOSIS — I10 ESSENTIAL HYPERTENSION WITH GOAL BLOOD PRESSURE LESS THAN 140/90: ICD-10-CM

## 2018-03-23 RX ORDER — AMLODIPINE BESYLATE 5 MG/1
TABLET ORAL
Qty: 30 TABLET | Refills: 0 | Status: SHIPPED | OUTPATIENT
Start: 2018-03-23 | End: 2018-03-27

## 2018-03-23 RX ORDER — PRAVASTATIN SODIUM 20 MG
20 TABLET ORAL DAILY
Qty: 90 TABLET | Refills: 3 | Status: SHIPPED | OUTPATIENT
Start: 2018-03-23 | End: 2018-03-27

## 2018-03-23 RX ORDER — LOSARTAN POTASSIUM 100 MG/1
100 TABLET ORAL DAILY
Qty: 30 TABLET | Refills: 0 | Status: SHIPPED | OUTPATIENT
Start: 2018-03-23 | End: 2018-03-27

## 2018-03-27 ENCOUNTER — OFFICE VISIT (OUTPATIENT)
Dept: FAMILY MEDICINE | Facility: CLINIC | Age: 53
End: 2018-03-27
Payer: COMMERCIAL

## 2018-03-27 VITALS
HEIGHT: 74 IN | BODY MASS INDEX: 33.24 KG/M2 | DIASTOLIC BLOOD PRESSURE: 90 MMHG | RESPIRATION RATE: 16 BRPM | SYSTOLIC BLOOD PRESSURE: 138 MMHG | WEIGHT: 259 LBS | OXYGEN SATURATION: 99 % | HEART RATE: 92 BPM | TEMPERATURE: 98.5 F

## 2018-03-27 DIAGNOSIS — I10 ESSENTIAL HYPERTENSION WITH GOAL BLOOD PRESSURE LESS THAN 140/90: Primary | ICD-10-CM

## 2018-03-27 DIAGNOSIS — E78.5 HYPERLIPIDEMIA LDL GOAL <130: ICD-10-CM

## 2018-03-27 DIAGNOSIS — E78.5 HYPERLIPIDEMIA LDL GOAL <100: ICD-10-CM

## 2018-03-27 DIAGNOSIS — K21.9 GASTROESOPHAGEAL REFLUX DISEASE WITHOUT ESOPHAGITIS: ICD-10-CM

## 2018-03-27 LAB
ANION GAP SERPL CALCULATED.3IONS-SCNC: 11 MMOL/L (ref 3–14)
BUN SERPL-MCNC: 11 MG/DL (ref 7–30)
CALCIUM SERPL-MCNC: 9 MG/DL (ref 8.5–10.1)
CHLORIDE SERPL-SCNC: 106 MMOL/L (ref 94–109)
CHOLEST SERPL-MCNC: 176 MG/DL
CO2 SERPL-SCNC: 22 MMOL/L (ref 20–32)
CREAT SERPL-MCNC: 1.11 MG/DL (ref 0.66–1.25)
GFR SERPL CREATININE-BSD FRML MDRD: 69 ML/MIN/1.7M2
GLUCOSE SERPL-MCNC: 97 MG/DL (ref 70–99)
HDLC SERPL-MCNC: 45 MG/DL
LDLC SERPL CALC-MCNC: 98 MG/DL
NONHDLC SERPL-MCNC: 131 MG/DL
POTASSIUM SERPL-SCNC: 4.1 MMOL/L (ref 3.4–5.3)
SODIUM SERPL-SCNC: 139 MMOL/L (ref 133–144)
TRIGL SERPL-MCNC: 167 MG/DL

## 2018-03-27 PROCEDURE — 36415 COLL VENOUS BLD VENIPUNCTURE: CPT | Performed by: PHYSICIAN ASSISTANT

## 2018-03-27 PROCEDURE — 99214 OFFICE O/P EST MOD 30 MIN: CPT | Performed by: PHYSICIAN ASSISTANT

## 2018-03-27 PROCEDURE — 80061 LIPID PANEL: CPT | Performed by: PHYSICIAN ASSISTANT

## 2018-03-27 PROCEDURE — 80048 BASIC METABOLIC PNL TOTAL CA: CPT | Performed by: PHYSICIAN ASSISTANT

## 2018-03-27 RX ORDER — AMLODIPINE BESYLATE 10 MG/1
TABLET ORAL
Qty: 90 TABLET | Refills: 1 | Status: SHIPPED | OUTPATIENT
Start: 2018-03-27 | End: 2018-10-29

## 2018-03-27 RX ORDER — LOSARTAN POTASSIUM 100 MG/1
100 TABLET ORAL DAILY
Qty: 90 TABLET | Refills: 1 | Status: SHIPPED | OUTPATIENT
Start: 2018-03-27 | End: 2018-10-29

## 2018-03-27 RX ORDER — PRAVASTATIN SODIUM 20 MG
20 TABLET ORAL DAILY
Qty: 90 TABLET | Refills: 3 | Status: SHIPPED | OUTPATIENT
Start: 2018-03-27 | End: 2018-10-29

## 2018-03-27 NOTE — PROGRESS NOTES
SUBJECTIVE:   Horacio Panda is a 52 year old male who presents to clinic today for the following health issues:      Hyperlipidemia Follow-Up       Rate your low fat/cholesterol diet?: not monitoring fat    Taking statin?  Yes, no muscle aches from statin    Other lipid medications/supplements?:  none    Hypertension Follow-up      Outpatient blood pressures are not being checked.    Low Salt Diet: not monitoring salt      Amount of exercise or physical activity: None    Problems taking medications regularly: No    Medication side effects: none    Diet: regular (no restrictions)      Has been feeling well overall.         Problem list and histories reviewed & adjusted, as indicated.  Additional history: as documented    BP Readings from Last 3 Encounters:   03/27/18 138/90   04/18/17 (P) 122/84   03/23/17 134/86    Wt Readings from Last 3 Encounters:   03/27/18 259 lb (117.5 kg)   04/18/17 267 lb 6.4 oz (121.3 kg)   03/23/17 270 lb (122.5 kg)                    Reviewed and updated as needed this visit by clinical staff  Tobacco  Allergies  Meds       Reviewed and updated as needed this visit by Provider         All other systems negative except as outline above  OBJECTIVE:  Eye exam - right eye normal lid, conjunctiva, cornea, pupil and fundus, left eye normal lid, conjunctiva, cornea, pupil and fundus.  Thyroid not palpable, not enlarged, no nodules detected.  CHEST:chest clear to IPPA, no tachypnea, retractions or cyanosis and S1, S2 normal, no murmur, no gallop, rate regular.  Foot exam - both sides normal; no swelling, tenderness or skin or vascular lesions. Color and temperature is normal. Sensation is intact. Peripheral pulses are palpable. Toenails are normal.    Horacio was seen today for hypertension and lipids.    Diagnoses and all orders for this visit:    Essential hypertension with goal blood pressure less than 140/90  -     Basic metabolic panel  (Ca, Cl, CO2, Creat, Gluc, K, Na, BUN)  -     losartan  (COZAAR) 100 MG tablet; Take 1 tablet (100 mg) by mouth daily  -     amLODIPine (NORVASC) 10 MG tablet; TAKE ONE TABLET BY MOUTH ONCE DAILY.    Hyperlipidemia LDL goal <130  -     Lipid panel reflex to direct LDL Fasting    Hyperlipidemia LDL goal <100  -     pravastatin (PRAVACHOL) 20 MG tablet; Take 1 tablet (20 mg) by mouth daily    Gastroesophageal reflux disease without esophagitis  -     ranitidine (ZANTAC) 300 MG tablet; Take 1 tablet (300 mg) by mouth At Bedtime      work on lifestyle modification  Recheck in 6 mos.

## 2018-03-27 NOTE — MR AVS SNAPSHOT
"              After Visit Summary   3/27/2018    Horacio Panda    MRN: [id number]           Patient Information     Date Of Birth          1965        Visit Information        Provider Department      3/27/2018 9:40 AM Kenton Richmond PA-C [facility] [place]        Today's Diagnoses     Essential hypertension with goal blood pressure less than 140/90    -  1    Hyperlipidemia LDL goal <130        Hyperlipidemia LDL goal <100        Gastroesophageal reflux disease without esophagitis           Follow-ups after your visit        Who to contact     Normal or non-critical lab and imaging results will be communicated to you by [employer], letter or phone within 4 business days after the clinic has received the results. If you do not hear from us within 7 days, please contact the clinic through [employer] or phone. If you have a critical or abnormal lab result, we will notify you by phone as soon as possible.  Submit refill requests through [employer] or call your pharmacy and they will forward the refill request to us. Please allow 3 business days for your refill to be completed.          If you need to speak with a  for additional information , please call: [phone]             Additional Information About Your Visit        MyChart Information     [employer] gives you secure access to your electronic health record. If you see a primary care provider, you can also send messages to your care team and make appointments. If you have questions, please call your primary care clinic.  If you do not have a primary care provider, please call [phone] and they will assist you.        Care EveryWhere ID     This is your Care EveryWhere ID. This could be used by other organizations to access your [place] medical records  [id number]        Your Vitals Were     Pulse Temperature Respirations Height Pulse Oximetry BMI (Body Mass Index)    92 98.5  F (36.9  C) (Tympanic) 16 6' 1.5\" (1.867 m) 99% 33.71 kg/m2 "       Blood Pressure from Last 3 Encounters:   03/27/18 138/90   04/18/17 (P) 122/84   03/23/17 134/86    Weight from Last 3 Encounters:   03/27/18 259 lb (117.5 kg)   04/18/17 267 lb 6.4 oz (121.3 kg)   03/23/17 270 lb (122.5 kg)              We Performed the Following     Basic metabolic panel  (Ca, Cl, CO2, Creat, Gluc, K, Na, BUN)     Lipid panel reflex to direct LDL Fasting          Today's Medication Changes          These changes are accurate as of 3/27/18 10:09 AM.  If you have any questions, ask your nurse or doctor.               These medicines have changed or have updated prescriptions.        Dose/Directions    amLODIPine 10 MG tablet   Commonly known as:  NORVASC   This may have changed:  medication strength   Used for:  Essential hypertension with goal blood pressure less than 140/90   Changed by:  Kenton Richmond PA-C        TAKE ONE TABLET BY MOUTH ONCE DAILY.   Quantity:  90 tablet   Refills:  1       ranitidine 300 MG tablet   Commonly known as:  ZANTAC   This may have changed:  See the new instructions.   Used for:  Gastroesophageal reflux disease without esophagitis   Changed by:  Kenton Richmond PA-C        Dose:  300 mg   Take 1 tablet (300 mg) by mouth At Bedtime   Quantity:  90 tablet   Refills:  3         Stop taking these medicines if you haven't already. Please contact your care team if you have questions.     methylPREDNISolone 4 MG tablet   Commonly known as:  MEDROL DOSEPAK   Stopped by:  Kenton Richmond PA-C           metroNIDAZOLE 0.75 % cream   Commonly known as:  METROCREAM   Stopped by:  Kenton Richmond PA-C                Where to get your medicines      These medications were sent to [id number] - [place], [place] - [address]  [address] [place] [address]     Phone:  [phone]     amLODIPine 10 MG tablet    losartan 100 MG tablet    pravastatin 20 MG tablet    ranitidine 300 MG tablet                Primary Care Provider Office Phone  # Fax #    Kenton Richmond PA-C [phone]       [address] [facility] [address]        Equal Access to Services     JOSÉ LUIS GUTIERREZ : Hadii aad ku hadasho Soomaali, waaxda luqadaha, qaybta kaalmada adeegyada, waxadelita gonzálesn ademilvia berry laFilibertochung iker. So [facility] [phone].    ATENCIÓN: Si habla español, tiene a harvey disposición servicios gratuitos de asistencia lingüística. Llame al [phone].    We comply with applicable federal civil rights laws and [place] laws. We do not discriminate on the basis of race, color, national origin, age, disability, sex, sexual orientation, or gender identity.            Thank you!     Thank you for choosing [facility]  for your care. Our goal is always to provide you with excellent care. Hearing back from our patients is one way we can continue to improve our services. Please take a few minutes to complete the written survey that you may receive in the mail after your visit with us. Thank you!             Your Updated Medication List - Protect others around you: Learn how to safely use, store and throw away your medicines at www.disposemymeds.org.          This list is accurate as of 3/27/18 10:09 AM.  Always use your most recent med list.                   Brand Name Dispense Instructions for use Diagnosis    amLODIPine 10 MG tablet    NORVASC    90 tablet    TAKE ONE TABLET BY MOUTH ONCE DAILY.    Essential hypertension with goal blood pressure less than 140/90       losartan 100 MG tablet    COZAAR    90 tablet    Take 1 tablet (100 mg) by mouth daily    Essential hypertension with goal blood pressure less than 140/90       pravastatin 20 MG tablet    PRAVACHOL    90 tablet    Take 1 tablet (20 mg) by mouth daily    Hyperlipidemia LDL goal <100       ranitidine 300 MG tablet    ZANTAC    90 tablet    Take 1 tablet (300 mg) by mouth At Bedtime    Gastroesophageal reflux disease without esophagitis

## 2018-04-10 ENCOUNTER — TELEPHONE (OUTPATIENT)
Dept: FAMILY MEDICINE | Facility: CLINIC | Age: 53
End: 2018-04-10

## 2018-04-10 NOTE — TELEPHONE ENCOUNTER
Panel Management Review  Summary:    Patient is due/failing the following:   BP CHECK with ancillary or pharmacy    Type of outreach:    Phone, left message for patient to call back.  and Sent iGisticst message.    Questions for provider review:    None                                                                                                                                    Sabi George MA       Chart routed to Care Team .

## 2018-04-18 ENCOUNTER — ALLIED HEALTH/NURSE VISIT (OUTPATIENT)
Dept: FAMILY MEDICINE | Facility: CLINIC | Age: 53
End: 2018-04-18
Payer: COMMERCIAL

## 2018-04-18 VITALS — DIASTOLIC BLOOD PRESSURE: 92 MMHG | SYSTOLIC BLOOD PRESSURE: 132 MMHG | HEART RATE: 76 BPM

## 2018-04-18 DIAGNOSIS — I10 ESSENTIAL HYPERTENSION WITH GOAL BLOOD PRESSURE LESS THAN 140/90: Primary | ICD-10-CM

## 2018-04-18 PROCEDURE — 99207 ZZC NO CHARGE NURSE ONLY: CPT | Performed by: PHYSICIAN ASSISTANT

## 2018-04-18 NOTE — MR AVS SNAPSHOT
After Visit Summary   4/18/2018    Horacio Panda    MRN: [id number]           Patient Information     Date Of Birth          1965        Visit Information        Provider Department      4/18/2018 8:20 AM Kenton Richmond PA-C [facility] [place]        Today's Diagnoses     Essential hypertension with goal blood pressure less than 140/90    -  1       Follow-ups after your visit        Who to contact     Normal or non-critical lab and imaging results will be communicated to you by Tes[employer], letter or phone within 4 business days after the clinic has received the results. If you do not hear from us within 7 days, please contact the clinic through Tes[employer] or phone. If you have a critical or abnormal lab result, we will notify you by phone as soon as possible.  Submit refill requests through [employer] or call your pharmacy and they will forward the refill request to us. Please allow 3 business days for your refill to be completed.          If you need to speak with a  for additional information , please call: [phone]             Additional Information About Your Visit        TesoraharFloq Information     [employer] gives you secure access to your electronic health record. If you see a primary care provider, you can also send messages to your care team and make appointments. If you have questions, please call your primary care clinic.  If you do not have a primary care provider, please call [phone] and they will assist you.        Care EveryWhere ID     This is your Care EveryWhere ID. This could be used by other organizations to access your [place] medical records  [id number]        Your Vitals Were     Pulse                   76            Blood Pressure from Last 3 Encounters:   04/18/18 (!) 132/92   03/27/18 138/90   04/18/17 (P) 122/84    Weight from Last 3 Encounters:   03/27/18 259 lb (117.5 kg)   04/18/17 267 lb 6.4 oz (121.3 kg)   03/23/17 270 lb (122.5 kg)               Today, you had the following     No orders found for display       Primary Care Provider Office Phone # Fax #    Kenton Richmond PA-C [phone]       [address]  [place] [address]        Equal Access to Services     [facility]: Hadii aad ku hadasho Soomaali, waaxda luqadaha, qaybta kaalmada adeegyada, waxay idiin hayaan adeeg kharash la'aan . So [facility] [phone].    ATENCIÓN: Si habla español, tiene a harvey disposición servicios gratuitos de asistencia lingüística. Llame al [phone].    We comply with applicable federal civil rights laws and [place] laws. We do not discriminate on the basis of race, color, national origin, age, disability, sex, sexual orientation, or gender identity.            Thank you!     Thank you for choosing [facility]  for your care. Our goal is always to provide you with excellent care. Hearing back from our patients is one way we can continue to improve our services. Please take a few minutes to complete the written survey that you may receive in the mail after your visit with us. Thank you!             Your Updated Medication List - Protect others around you: Learn how to safely use, store and throw away your medicines at www.disposemymeds.org.          This list is accurate as of 4/18/18 11:59 PM.  Always use your most recent med list.                   Brand Name Dispense Instructions for use Diagnosis    amLODIPine 10 MG tablet    NORVASC    90 tablet    TAKE ONE TABLET BY MOUTH ONCE DAILY.    Essential hypertension with goal blood pressure less than 140/90       losartan 100 MG tablet    COZAAR    90 tablet    Take 1 tablet (100 mg) by mouth daily    Essential hypertension with goal blood pressure less than 140/90       pravastatin 20 MG tablet    PRAVACHOL    90 tablet    Take 1 tablet (20 mg) by mouth daily    Hyperlipidemia LDL goal <100       ranitidine 300 MG tablet    ZANTAC    90 tablet    Take 1 tablet (300 mg) by mouth At  Bedtime    Gastroesophageal reflux disease without esophagitis

## 2018-04-18 NOTE — PROGRESS NOTES
Horacio Panda is enrolled/participating in the retail pharmacy Blood Pressure Goals Achievement Program (BPGAP).  Horacio Panda was evaluated at [facility] on April 18, 2018 at which time his blood pressure was:    BP Readings from Last 3 Encounters:   04/18/18 (!) 132/92   03/27/18 138/90   04/18/17 (P) 122/84     Reviewed lifestyle modifications for blood pressure control and reduction: including making healthy food choices, managing weight, getting regular exercise, smoking cessation, reducing alcohol consumption, monitoring blood pressure regularly.     Horacio Panda is not experiencing symptoms.    Follow-Up: BP is not at goal of < 140/90mmHg (patient 18+ years of age with or without diabetes), Recommended follow-up with PCP.  Routing to PCP for further review.    Recommendation to Provider: add another hypertension med to regimen    Horacio Panda was evaluated for enrollment into the PGEN study today.    Patient eligible for enrollment:  No  Patient interested in enrollment:  No    Completed by:   Binh Doyle, Pharm. D  [facility]  [phone]

## 2018-04-20 ENCOUNTER — TELEPHONE (OUTPATIENT)
Dept: FAMILY MEDICINE | Facility: CLINIC | Age: 53
End: 2018-04-20

## 2018-04-20 NOTE — TELEPHONE ENCOUNTER
Left message on voice mail for patient to call clinic. [phone]  To give below information from CC'd chart.  Kenton Barboza RN, PA-C (Physician Assistant - C)     Physician Assistant      His blood pressure was a little high but for now, Have savannah work on lifestyle changes (low salt, lower calorie diet and consistent exercise) to help lower his blood pressure over the next 2 mos and then side effects me for a recheck .      Progress Notes   Binh Doyle [facility] (Pharmacist)   Unsigned      Savannah Panda is enrolled/participating in the retail pharmacy Blood Pressure Goals Achievement Program (BPGAP).  Savannah Panda was evaluated at [facility] on April 18, 2018 at which time his blood pressure was:         BP Readings from Last 3 Encounters:   04/18/18 (!) 132/92   03/27/18 138/90   04/18/17 (P) 122/84      Reviewed lifestyle modifications for blood pressure control and reduction: including making healthy food choices, managing weight, getting regular exercise, smoking cessation, reducing alcohol consumption, monitoring blood pressure regularly.      Savannah Panda is not experiencing symptoms.     Follow-Up: BP is not at goal of < 140/90mmHg (patient 18+ years of age with or without diabetes), Recommended follow-up with PCP.  Routing to PCP for further review.     Recommendation to Provider: add another hypertension med to regimen     Savannah Panda was evaluated for enrollment into the PGEN study today

## 2018-04-20 NOTE — PROGRESS NOTES
His blood pressure was a little high but for now, Have savannah work on lifestyle changes (low salt, lower calorie diet and consistent exercise) to help lower his blood pressure over the next 2 mos and then side effects me for a recheck .

## 2018-04-23 NOTE — TELEPHONE ENCOUNTER
Pt came in 4/18/18. See note below. Sabi George, Kenton Desir PA-C (Physician Assistant - C)     Physician Assistant       His blood pressure was a little high but for now, Have savannah work on lifestyle changes (low salt, lower calorie diet and consistent exercise) to help lower his blood pressure over the next 2 mos and then see me for a recheck .

## 2018-09-12 DIAGNOSIS — I10 ESSENTIAL HYPERTENSION WITH GOAL BLOOD PRESSURE LESS THAN 140/90: ICD-10-CM

## 2018-09-12 NOTE — TELEPHONE ENCOUNTER
Per Kenton Richmond's note on 4/18/18:  Progress Notes   Kenton [place] PA-C (Physician Assistant - C)     Physician Assistant      His blood pressure was a little high but for now, Have savannah work on lifestyle changes (low salt, lower calorie diet and consistent exercise) to help lower his blood pressure over the next 2 mos and then side effects me for a recheck .            Routing refill request to provider for review/approval because:  Blood pressures not within parameters for RN rx refill protocol.  Ramonita Mcintosh RN on 9/12/2018 at 3:01 PM

## 2018-09-12 NOTE — TELEPHONE ENCOUNTER
"Requested Prescriptions   Pending Prescriptions Disp Refills     amLODIPine (NORVASC) 5 MG tablet [Pharmacy Med Name: AmLODIPine Besylate Oral Tablet 5 MG] 30 tablet 0    Last Written Prescription Date:  3/23/18  Last Fill Quantity: 30,  # refills: 0   Last office visit: 3/27/2018 with prescribing provider:  3/27/18 KETTY [place]   Future Office Visit:   Sig: TAKE ONE TABLET BY MOUTH ONCE DAILY.    Calcium Channel Blockers Protocol  Failed    9/12/2018  2:16 PM       Failed - Blood pressure under 140/90 in past 12 months    BP Readings from Last 3 Encounters:   04/18/18 (!) 132/92   03/27/18 138/90   04/18/17 (P) 122/84                Passed - Recent (12 mo) or future (30 days) visit within the authorizing provider's specialty    Patient had office visit in the last 12 months or has a visit in the next 30 days with authorizing provider or within the authorizing provider's specialty.  See \"Patient Info\" tab in inbasket, or \"Choose Columns\" in Meds & Orders section of the refill encounter.           Passed - Patient is age 18 or older       Passed - Normal serum creatinine on file in past 12 months    Recent Labs   Lab Test  03/27/18   1012   CR  1.11             "

## 2018-09-13 RX ORDER — AMLODIPINE BESYLATE 5 MG/1
TABLET ORAL
Qty: 30 TABLET | Refills: 0 | Status: SHIPPED | OUTPATIENT
Start: 2018-09-13 | End: 2018-10-29

## 2018-10-29 ENCOUNTER — OFFICE VISIT (OUTPATIENT)
Dept: FAMILY MEDICINE | Facility: CLINIC | Age: 53
End: 2018-10-29
Payer: COMMERCIAL

## 2018-10-29 VITALS
HEART RATE: 72 BPM | WEIGHT: 260 LBS | BODY MASS INDEX: 33.37 KG/M2 | TEMPERATURE: 98.1 F | OXYGEN SATURATION: 98 % | DIASTOLIC BLOOD PRESSURE: 88 MMHG | SYSTOLIC BLOOD PRESSURE: 130 MMHG | HEIGHT: 74 IN | RESPIRATION RATE: 18 BRPM

## 2018-10-29 DIAGNOSIS — E78.5 HYPERLIPIDEMIA LDL GOAL <100: ICD-10-CM

## 2018-10-29 DIAGNOSIS — I10 ESSENTIAL HYPERTENSION WITH GOAL BLOOD PRESSURE LESS THAN 140/90: Primary | ICD-10-CM

## 2018-10-29 DIAGNOSIS — Z23 IMMUNIZATION DUE: ICD-10-CM

## 2018-10-29 DIAGNOSIS — K21.9 GASTROESOPHAGEAL REFLUX DISEASE WITHOUT ESOPHAGITIS: ICD-10-CM

## 2018-10-29 PROCEDURE — 90472 IMMUNIZATION ADMIN EACH ADD: CPT | Performed by: PHYSICIAN ASSISTANT

## 2018-10-29 PROCEDURE — 90686 IIV4 VACC NO PRSV 0.5 ML IM: CPT | Performed by: PHYSICIAN ASSISTANT

## 2018-10-29 PROCEDURE — 99214 OFFICE O/P EST MOD 30 MIN: CPT | Mod: 25 | Performed by: PHYSICIAN ASSISTANT

## 2018-10-29 PROCEDURE — 90471 IMMUNIZATION ADMIN: CPT | Performed by: PHYSICIAN ASSISTANT

## 2018-10-29 PROCEDURE — 90750 HZV VACC RECOMBINANT IM: CPT | Performed by: PHYSICIAN ASSISTANT

## 2018-10-29 RX ORDER — PRAVASTATIN SODIUM 20 MG
20 TABLET ORAL DAILY
Qty: 90 TABLET | Refills: 3 | Status: SHIPPED | OUTPATIENT
Start: 2018-10-29 | End: 2019-09-24

## 2018-10-29 RX ORDER — AMLODIPINE BESYLATE 10 MG/1
TABLET ORAL
Qty: 90 TABLET | Refills: 1 | Status: SHIPPED | OUTPATIENT
Start: 2018-10-29 | End: 2019-06-28

## 2018-10-29 RX ORDER — LOSARTAN POTASSIUM 100 MG/1
100 TABLET ORAL DAILY
Qty: 90 TABLET | Refills: 1 | Status: SHIPPED | OUTPATIENT
Start: 2018-10-29 | End: 2019-07-23

## 2018-10-29 NOTE — PROGRESS NOTES
SUBJECTIVE:   Horacoi Panda is a 53 year old male who presents to clinic today for the following health issues:      Hyperlipidemia Follow-Up      Rate your low fat/cholesterol diet?: fair    Taking statin?  Yes, no muscle aches from statin    Other lipid medications/supplements?:  none  No chest pain/sob/palps.   Hypertension Follow-up      Outpatient blood pressures are not being checked.    Low Salt Diet: not monitoring salt      Amount of exercise or physical activity: None    Problems taking medications regularly: No    Medication side effects: none    Diet: low fat/cholesterol      Gastrointestinal symptoms      Duration: chronic    Description:           REFLUX SYMPTOMS - heartburn      Intensity:  mild    Accompanying signs and symptoms:  none    History  Previous {similar problem: YES  Previous evaluation:  none    Aggravating factors:  Alcohol and cream    Alleviating factors: nothing    Other Therapies tried: zantac with success          Problem list and histories reviewed & adjusted, as indicated.  Additional history: as documented    BP Readings from Last 3 Encounters:   10/29/18 130/88   04/18/18 (!) 132/92   03/27/18 138/90    Wt Readings from Last 3 Encounters:   10/29/18 260 lb (117.9 kg)   03/27/18 259 lb (117.5 kg)   04/18/17 267 lb 6.4 oz (121.3 kg)                    Reviewed and updated as needed this visit by clinical staff  Tobacco  Allergies  Meds  Problems  Med Hx  Surg Hx  Fam Hx  Soc Hx        Reviewed and updated as needed this visit by Provider  Tobacco  Allergies  Meds  Problems  Med Hx  Surg Hx  Fam Hx  Soc Hx          All other systems negative except as outline above  OBJECTIVE:  Eye exam - right eye normal lid, conjunctiva, cornea, pupil and fundus, left eye normal lid, conjunctiva, cornea, pupil and fundus.  Thyroid not palpable, not enlarged, no nodules detected.  CHEST:chest clear to IPPA, no tachypnea, retractions or cyanosis and S1, S2 normal, no murmur, no  gallop, rate regular.  The abdomen is soft without tenderness, guarding, mass, rebound or organomegaly. Bowel sounds are normal. No CVA tenderness or inguinal adenopathy noted.    Horacio was seen today for hypertension, lipids and gastrointestinal problem.    Diagnoses and all orders for this visit:    Essential hypertension with goal blood pressure less than 140/90  -     amLODIPine (NORVASC) 10 MG tablet; TAKE ONE TABLET BY MOUTH ONCE DAILY.  -     losartan (COZAAR) 100 MG tablet; Take 1 tablet (100 mg) by mouth daily  -     Basic metabolic panel  (Ca, Cl, CO2, Creat, Gluc, K, Na, BUN)    Hyperlipidemia LDL goal <100  -     pravastatin (PRAVACHOL) 20 MG tablet; Take 1 tablet (20 mg) by mouth daily    Gastroesophageal reflux disease without esophagitis  -     ranitidine (ZANTAC) 300 MG tablet; Take 1 tablet (300 mg) by mouth At Bedtime  -     ZOSTER VACCINE RECOMBINANT ADJUVANTED IM NJX    Immunization due  -     FLU VAC PRESRV FREE QUAD SPLIT VIR, IM (3+ YRS)  -     ADMIN 1st VACCINE      work on lifestyle modification  Recheck in 6 mos

## 2018-10-29 NOTE — MR AVS SNAPSHOT
After Visit Summary   10/29/2018    Horacio Panda    MRN: [id number]           Patient Information     Date Of Birth          1965        Visit Information        Provider Department      10/29/2018 12:00 PM Kenton Richmond PA-C [facility] [place]        Today's Diagnoses     Essential hypertension with goal blood pressure less than 140/90    -  1    Hyperlipidemia LDL goal <100        Gastroesophageal reflux disease without esophagitis        Immunization due           Follow-ups after your visit        Follow-up notes from your care team     Return in about 6 months (around 4/29/2019) for BP Recheck.      Who to contact     Normal or non-critical lab and imaging results will be communicated to you by Huupyhart, letter or phone within 4 business days after the clinic has received the results. If you do not hear from us within 7 days, please contact the clinic through [employer] System[employer] or phone. If you have a critical or abnormal lab result, we will notify you by phone as soon as possible.  Submit refill requests through eTobb or call your pharmacy and they will forward the refill request to us. Please allow 3 business days for your refill to be completed.          If you need to speak with a  for additional information , please call: [phone]             Additional Information About Your Visit        HuupyharLifeWave Information     eTobb gives you secure access to your electronic health record. If you see a primary care provider, you can also send messages to your care team and make appointments. If you have questions, please call your primary care clinic.  If you do not have a primary care provider, please call [phone] and they will assist you.        Care EveryWhere ID     This is your Care EveryWhere ID. This could be used by other organizations to access your [facility] medical records  [id number]        Your Vitals Were     Pulse Temperature Respirations Height Pulse Oximetry  "BMI (Body Mass Index)    72 98.1  F (36.7  C) (Tympanic) 18 6' 1.5\" (1.867 m) 98% 33.84 kg/m2       Blood Pressure from Last 3 Encounters:   10/29/18 130/88   04/18/18 (!) 132/92   03/27/18 138/90    Weight from Last 3 Encounters:   10/29/18 260 lb (117.9 kg)   03/27/18 259 lb (117.5 kg)   04/18/17 267 lb 6.4 oz (121.3 kg)              We Performed the Following     ADMIN 1st VACCINE     Basic metabolic panel  (Ca, Cl, CO2, Creat, Gluc, K, Na, BUN)     FLU VAC PRESRV FREE QUAD SPLIT VIR, IM (3+ YRS)     ZOSTER VACCINE RECOMBINANT ADJUVANTED IM NJX          Today's Medication Changes          These changes are accurate as of 10/29/18 12:31 PM.  If you have any questions, ask your nurse or doctor.               These medicines have changed or have updated prescriptions.        Dose/Directions    amLODIPine 10 MG tablet   Commonly known as:  NORVASC   This may have changed:    - medication strength  - Another medication with the same name was removed. Continue taking this medication, and follow the directions you see here.   Used for:  Essential hypertension with goal blood pressure less than 140/90   Changed by:  Kenton Richmond PA-C        TAKE ONE TABLET BY MOUTH ONCE DAILY.   Quantity:  90 tablet   Refills:  1            Where to get your medicines      These medications were sent to [id number] - [place], [place] - [address]  [address] [place] [address]     Phone:  [phone]     amLODIPine 10 MG tablet    losartan 100 MG tablet    pravastatin 20 MG tablet    ranitidine 300 MG tablet                Primary Care Provider Office Phone # Fax #    Kenton Richmond PA-C [phone] [address] [place] [address]        Equal Access to Services     ISAEL GUTIERREZ AH: Deepti douglas Sojoo, waaxda luqadaha, qaybta kaalmada adeegyadaniel, april dong. [facility] [phone].    ATENCIÓN: Si habla español, tiene a harvey disposición " servicios gratuitos de asistencia lingüística. Vik johansen [phone].    We comply with applicable federal civil rights laws and [place] laws. We do not discriminate on the basis of race, color, national origin, age, disability, sex, sexual orientation, or gender identity.            Thank you!     Thank you for choosing [facility]  for your care. Our goal is always to provide you with excellent care. Hearing back from our patients is one way we can continue to improve our services. Please take a few minutes to complete the written survey that you may receive in the mail after your visit with us. Thank you!             Your Updated Medication List - Protect others around you: Learn how to safely use, store and throw away your medicines at www.disposemymeds.org.          This list is accurate as of 10/29/18 12:31 PM.  Always use your most recent med list.                   Brand Name Dispense Instructions for use Diagnosis    amLODIPine 10 MG tablet    NORVASC    90 tablet    TAKE ONE TABLET BY MOUTH ONCE DAILY.    Essential hypertension with goal blood pressure less than 140/90       losartan 100 MG tablet    COZAAR    90 tablet    Take 1 tablet (100 mg) by mouth daily    Essential hypertension with goal blood pressure less than 140/90       pravastatin 20 MG tablet    PRAVACHOL    90 tablet    Take 1 tablet (20 mg) by mouth daily    Hyperlipidemia LDL goal <100       ranitidine 300 MG tablet    ZANTAC    90 tablet    Take 1 tablet (300 mg) by mouth At Bedtime    Gastroesophageal reflux disease without esophagitis

## 2019-06-21 NOTE — Clinical Note
Patient presents today with c/o painful area on lower buttock since Wednesday, became more painful as days went on. Pt states hse has a hx of hidradenitis suppurative.    Routing message to PCP for review -BP checked at pharmacy and noted to be above goal. Recommended patient follow-up with PCP.

## 2019-06-28 DIAGNOSIS — I10 ESSENTIAL HYPERTENSION WITH GOAL BLOOD PRESSURE LESS THAN 140/90: ICD-10-CM

## 2019-06-28 NOTE — TELEPHONE ENCOUNTER
Patient due for HTN follow up.   30 day veronique period fill pended for provider approval with reminder to patient to schedule appointment for further refills.   Please advise on veronique refill.     Amber Hughes RN, BSN, PHN

## 2019-06-28 NOTE — TELEPHONE ENCOUNTER
"Requested Prescriptions   Pending Prescriptions Disp Refills     amLODIPine (NORVASC) 10 MG tablet [Pharmacy Med Name: amLODIPine Besylate Oral Tablet 10 MG] 90 tablet 0     Sig: TAKE ONE TABLET BY MOUTH ONCE DAILY.  Last Written Prescription Date:  3/5/19  Last Fill Quantity: 90,  # refills: 0   Last office visit: 10/29/2018 with prescribing provider:  KETTY Richmond   Future Office Visit:         Calcium Channel Blockers Protocol  Failed - 6/28/2019  7:13 AM        Failed - Normal serum creatinine on file in past 12 months     Recent Labs   Lab Test 03/27/18  1012   CR 1.11             Passed - Blood pressure under 140/90 in past 12 months     BP Readings from Last 3 Encounters:   10/29/18 130/88   04/18/18 (!) 132/92   03/27/18 138/90                 Passed - Recent (12 mo) or future (30 days) visit within the authorizing provider's specialty     Patient had office visit in the last 12 months or has a visit in the next 30 days with authorizing provider or within the authorizing provider's specialty.  See \"Patient Info\" tab in inbasket, or \"Choose Columns\" in Meds & Orders section of the refill encounter.              Passed - Medication is active on med list        Passed - Patient is age 18 or older        "

## 2019-06-29 RX ORDER — AMLODIPINE BESYLATE 10 MG/1
TABLET ORAL
Qty: 30 TABLET | Refills: 0 | Status: SHIPPED | OUTPATIENT
Start: 2019-06-29 | End: 2019-09-06

## 2019-07-17 DIAGNOSIS — I10 ESSENTIAL HYPERTENSION WITH GOAL BLOOD PRESSURE LESS THAN 140/90: ICD-10-CM

## 2019-07-17 NOTE — TELEPHONE ENCOUNTER
"Requested Prescriptions   Pending Prescriptions Disp Refills     amLODIPine (NORVASC) 10 MG tablet [Pharmacy Med Name: amLODIPine Besylate Oral Tablet 10 MG] 90 tablet 0     Sig: TAKE ONE TABLET BY MOUTH ONCE DAILY.  Last Written Prescription Date:  3/5/19  Last Fill Quantity: 90,  # refills: 0   Last office visit: 10/29/2018 with prescribing provider:  KETTY Richmond   Future Office Visit:         Calcium Channel Blockers Protocol  Failed - 7/17/2019 11:16 AM        Failed - Normal serum creatinine on file in past 12 months     Recent Labs   Lab Test 03/27/18  1012   CR 1.11             Passed - Blood pressure under 140/90 in past 12 months     BP Readings from Last 3 Encounters:   10/29/18 130/88   04/18/18 (!) 132/92   03/27/18 138/90                 Passed - Recent (12 mo) or future (30 days) visit within the authorizing provider's specialty     Patient had office visit in the last 12 months or has a visit in the next 30 days with authorizing provider or within the authorizing provider's specialty.  See \"Patient Info\" tab in inbasket, or \"Choose Columns\" in Meds & Orders section of the refill encounter.              Passed - Medication is active on med list        Passed - Patient is age 18 or older        "

## 2019-07-17 NOTE — TELEPHONE ENCOUNTER
Routing refill request to provider for review/approval because:  An given x1 and patient did not follow up, please advise

## 2019-07-18 RX ORDER — AMLODIPINE BESYLATE 10 MG/1
TABLET ORAL
Qty: 30 TABLET | Refills: 0 | Status: SHIPPED | OUTPATIENT
Start: 2019-07-18 | End: 2020-08-20

## 2019-07-23 DIAGNOSIS — I10 ESSENTIAL HYPERTENSION WITH GOAL BLOOD PRESSURE LESS THAN 140/90: ICD-10-CM

## 2019-07-23 NOTE — TELEPHONE ENCOUNTER
Patient due for 6 month HTN follow up    30 day veronique period fill pended for provider approval with reminder to patient to schedule appointment for further refills.   Please advise on veronique refill.     Amber Hughes RN, BSN, PHN

## 2019-07-23 NOTE — TELEPHONE ENCOUNTER
"Requested Prescriptions   Pending Prescriptions Disp Refills     losartan (COZAAR) 100 MG tablet [Pharmacy Med Name: Losartan Potassium Oral Tablet 100 MG] 90 tablet 0     Sig: Take 1 tablet (100 mg) by mouth daily   Last Written Prescription Date:  3-5-19  Last Fill Quantity: 90,  # refills: 0   Last office visit: 10/29/2018 with prescribing provider:  10-29-18   Future Office Visit:      Angiotensin-II Receptors Failed - 7/23/2019  9:19 AM        Failed - Normal serum creatinine on file in past 12 months     Recent Labs   Lab Test 03/27/18  1012   CR 1.11             Failed - Normal serum potassium on file in past 12 months     Recent Labs   Lab Test 03/27/18  1012   POTASSIUM 4.1                    Passed - Blood pressure under 140/90 in past 12 months     BP Readings from Last 3 Encounters:   10/29/18 130/88   04/18/18 (!) 132/92   03/27/18 138/90                 Passed - Recent (12 mo) or future (30 days) visit within the authorizing provider's specialty     Patient had office visit in the last 12 months or has a visit in the next 30 days with authorizing provider or within the authorizing provider's specialty.  See \"Patient Info\" tab in inbasket, or \"Choose Columns\" in Meds & Orders section of the refill encounter.              Passed - Medication is active on med list        Passed - Patient is age 18 or older        "

## 2019-07-24 RX ORDER — LOSARTAN POTASSIUM 100 MG/1
100 TABLET ORAL DAILY
Qty: 30 TABLET | Refills: 0 | Status: SHIPPED | OUTPATIENT
Start: 2019-07-24 | End: 2019-09-06

## 2019-07-24 NOTE — TELEPHONE ENCOUNTER
savannah is due for a recheck. Have him make an appt to see me.for a recheck of his blood pressure and fasting lab work . Would recommend an annual physical

## 2019-09-06 ENCOUNTER — MYC REFILL (OUTPATIENT)
Dept: FAMILY MEDICINE | Facility: CLINIC | Age: 54
End: 2019-09-06

## 2019-09-06 DIAGNOSIS — I10 ESSENTIAL HYPERTENSION WITH GOAL BLOOD PRESSURE LESS THAN 140/90: ICD-10-CM

## 2019-09-06 NOTE — TELEPHONE ENCOUNTER
"Requested Prescriptions   Pending Prescriptions Disp Refills     amLODIPine (NORVASC) 10 MG tablet [Pharmacy Med Name: amLODIPine Besylate Oral Tablet 10 MG] 30 tablet 0     Sig: TAKE ONE TABLET BY MOUTH ONCE DAILY.  Last Written Prescription Date:  6/29/19  Last Fill Quantity: 30,  # refills: 0   Last office visit: 10/29/2018 with prescribing provider:  KETTY Richmond   Future Office Visit:   Next 5 appointments (look out 90 days)    Sep 16, 2019 11:55 AM CDT  Office Visit with Kenton Richmond PA-C  [facility] ([facility] [address] [address]  [phone]              Calcium Channel Blockers Protocol  Failed - 9/6/2019  9:44 AM        Failed - Normal serum creatinine on file in past 12 months     Recent Labs   Lab Test 03/27/18  1012   CR 1.11             Passed - Blood pressure under 140/90 in past 12 months     BP Readings from Last 3 Encounters:   10/29/18 130/88   04/18/18 (!) 132/92   03/27/18 138/90                 Passed - Recent (12 mo) or future (30 days) visit within the authorizing provider's specialty     Patient had office visit in the last 12 months or has a visit in the next 30 days with authorizing provider or within the authorizing provider's specialty.  See \"Patient Info\" tab in inbasket, or \"Choose Columns\" in Meds & Orders section of the refill encounter.              Passed - Medication is active on med list        Passed - Patient is age 18 or older        "

## 2019-09-06 NOTE — TELEPHONE ENCOUNTER
Routing refill request to provider for review/approval because:  An given x1 and patient did not follow up, please advise    Creatinine   Date Value Ref Range Status   03/27/2018 1.11 0.66 - 1.25 mg/dL Final     Amber Alvarez RN, BSN, PHN

## 2019-09-07 RX ORDER — AMLODIPINE BESYLATE 10 MG/1
TABLET ORAL
Qty: 30 TABLET | Refills: 0 | Status: SHIPPED | OUTPATIENT
Start: 2019-09-07 | End: 2019-09-24

## 2019-09-07 RX ORDER — LOSARTAN POTASSIUM 100 MG/1
100 TABLET ORAL DAILY
Qty: 30 TABLET | Refills: 0 | Status: SHIPPED | OUTPATIENT
Start: 2019-09-07 | End: 2019-09-24

## 2019-09-24 ENCOUNTER — OFFICE VISIT (OUTPATIENT)
Dept: FAMILY MEDICINE | Facility: CLINIC | Age: 54
End: 2019-09-24
Payer: COMMERCIAL

## 2019-09-24 VITALS
SYSTOLIC BLOOD PRESSURE: 134 MMHG | WEIGHT: 266 LBS | TEMPERATURE: 97.6 F | OXYGEN SATURATION: 100 % | HEART RATE: 87 BPM | HEIGHT: 73 IN | BODY MASS INDEX: 35.25 KG/M2 | RESPIRATION RATE: 18 BRPM | DIASTOLIC BLOOD PRESSURE: 85 MMHG

## 2019-09-24 DIAGNOSIS — E66.01 MORBID OBESITY (H): ICD-10-CM

## 2019-09-24 DIAGNOSIS — K21.9 GASTROESOPHAGEAL REFLUX DISEASE WITHOUT ESOPHAGITIS: ICD-10-CM

## 2019-09-24 DIAGNOSIS — I10 ESSENTIAL HYPERTENSION WITH GOAL BLOOD PRESSURE LESS THAN 140/90: Primary | ICD-10-CM

## 2019-09-24 DIAGNOSIS — M71.30 MYXOID CYST: ICD-10-CM

## 2019-09-24 DIAGNOSIS — E78.5 HYPERLIPIDEMIA LDL GOAL <100: ICD-10-CM

## 2019-09-24 PROCEDURE — 20600 DRAIN/INJ JOINT/BURSA W/O US: CPT | Mod: RT | Performed by: PHYSICIAN ASSISTANT

## 2019-09-24 PROCEDURE — 99214 OFFICE O/P EST MOD 30 MIN: CPT | Mod: 25 | Performed by: PHYSICIAN ASSISTANT

## 2019-09-24 RX ORDER — AMLODIPINE BESYLATE 10 MG/1
10 TABLET ORAL DAILY
Qty: 90 TABLET | Refills: 1 | Status: SHIPPED | OUTPATIENT
Start: 2019-09-24 | End: 2020-08-20

## 2019-09-24 RX ORDER — LOSARTAN POTASSIUM 100 MG/1
100 TABLET ORAL DAILY
Qty: 90 TABLET | Refills: 1 | Status: SHIPPED | OUTPATIENT
Start: 2019-09-24 | End: 2020-08-04

## 2019-09-24 RX ORDER — PRAVASTATIN SODIUM 20 MG
20 TABLET ORAL DAILY
Qty: 90 TABLET | Refills: 3 | Status: SHIPPED | OUTPATIENT
Start: 2019-09-24 | End: 2022-04-05

## 2019-09-24 ASSESSMENT — MIFFLIN-ST. JEOR: SCORE: 2100.45

## 2019-09-24 NOTE — PROGRESS NOTES
"Subjective     Horacio Panda is a 54 year old male who presents to clinic today for the following health issues:    HPI   Hyperlipidemia Follow-Up      Are you having any of the following symptoms? (Select all that apply)  No complaints of shortness of breath, chest pain or pressure.  No increased sweating or nausea with activity.  No left-sided neck or arm pain.  No complaints of pain in calves when walking 1-2 blocks.    Are you regularly taking any medication or supplement to lower your cholesterol?   Yes- pravastatin 20mg daily    Are you having muscle aches or other side effects that you think could be caused by your cholesterol lowering medication?  No      Hypertension Follow-up      Do you check your blood pressure regularly outside of the clinic? No     Are you following a low salt diet? No    Are your blood pressures ever more than 140 on the top number (systolic) OR more   than 90 on the bottom number (diastolic), for example 140/90? No      How many servings of fruits and vegetables do you eat daily?  0-1    On average, how many sweetened beverages do you drink each day (soda, juice, sweet tea, etc)?   2    How many days per week do you miss taking your medication? 0            No Known Allergies  BP Readings from Last 3 Encounters:   09/24/19 134/85   10/29/18 130/88   04/18/18 (!) 132/92    Wt Readings from Last 3 Encounters:   09/24/19 120.7 kg (266 lb)   10/29/18 117.9 kg (260 lb)   03/27/18 117.5 kg (259 lb)                      Reviewed and updated as needed this visit by Provider         Review of Systems   ROS COMP: Constitutional, HEENT, cardiovascular, pulmonary, GI, , musculoskeletal, neuro, skin, endocrine and psych systems are negative, except as otherwise noted.      Objective    /85   Pulse 87   Temp 97.6  F (36.4  C) (Tympanic)   Resp 18   Ht 1.854 m (6' 1\")   Wt 120.7 kg (266 lb)   SpO2 100%   BMI 35.09 kg/m    Body mass index is 35.09 kg/m .  Physical Exam       Eye exam - " right eye normal lid, conjunctiva, cornea, pupil and fundus, left eye normal lid, conjunctiva, cornea, pupil and fundus.  Thyroid not palpable, not enlarged, no nodules detected.  CHEST:chest clear to IPPA, no tachypnea, retractions or cyanosis and S1, S2 normal, no murmur, no gallop, rate regular.  Right middle finger digital myxoid cyst present.    PROCEDURE:  JOINT ASPIRATION/INJECTION  After a discussion of risks, benefits and side effects of procedure, informed patient consent was obtained.  The right middle finger was prepped and draped in the usual clean fashion (sterile not required for this procedure).  0.5 cc of 1% lidocaine was used for local analgesia.    ASPIRATION: Not performed. (or)  Using a 16 gauge needle and 3 cc syringe, 0.5 cc of clear fluid was      aspirated without complications.            Horacio was seen today for hypertension.    Diagnoses and all orders for this visit:    Essential hypertension with goal blood pressure less than 140/90  -     Basic metabolic panel  (Ca, Cl, CO2, Creat, Gluc, K, Na, BUN)  -     amLODIPine (NORVASC) 10 MG tablet; Take 1 tablet (10 mg) by mouth daily  -     losartan (COZAAR) 100 MG tablet; Take 1 tablet (100 mg) by mouth daily    Hyperlipidemia LDL goal <100  -     pravastatin (PRAVACHOL) 20 MG tablet; Take 1 tablet (20 mg) by mouth daily  -     Lipid panel reflex to direct LDL Fasting; Future    Gastroesophageal reflux disease without esophagitis  -     ranitidine (ZANTAC) 300 MG tablet; Take 1 tablet (300 mg) by mouth At Bedtime    Myxoid cyst  -     DRAIN/INJECT SMALL JOINT/BURSA    Morbid obesity (H)      work on lifestyle modification  Recheck in 6 mos

## 2019-11-04 ENCOUNTER — HEALTH MAINTENANCE LETTER (OUTPATIENT)
Age: 54
End: 2019-11-04

## 2020-07-30 DIAGNOSIS — I10 ESSENTIAL HYPERTENSION WITH GOAL BLOOD PRESSURE LESS THAN 140/90: ICD-10-CM

## 2020-07-30 NOTE — TELEPHONE ENCOUNTER
Requested Prescriptions   Pending Prescriptions Disp Refills     losartan (COZAAR) 100 MG tablet 90 tablet 1     Sig: Take 1 tablet (100 mg) by mouth daily  Last Written Prescription Date:  2/25/20  Last Fill Quantity: 90,  # refills: 1   Last office visit: 9/24/2019 with prescribing provider:  KETTY Richmond   Future Office Visit:               There is no refill protocol information for this order

## 2020-08-04 NOTE — TELEPHONE ENCOUNTER
Routing refill request to provider for review/approval because:  Labs not current:  Creatinine, Potassium    Last OV with Kenton: 9/24/2019 with advised F/U in 6 months.    Meds pended with reminder due for appt. Please advise.    Ramonita Mcintosh, RN, BSN

## 2020-08-05 RX ORDER — LOSARTAN POTASSIUM 100 MG/1
100 TABLET ORAL DAILY
Qty: 30 TABLET | Refills: 0 | Status: SHIPPED | OUTPATIENT
Start: 2020-08-05 | End: 2020-08-20

## 2020-08-05 NOTE — TELEPHONE ENCOUNTER
savannah is due for a recheck. Have him make an appt to see me.for a face to face physical later this month.

## 2020-08-18 DIAGNOSIS — I10 ESSENTIAL HYPERTENSION WITH GOAL BLOOD PRESSURE LESS THAN 140/90: ICD-10-CM

## 2020-08-18 NOTE — TELEPHONE ENCOUNTER
Requested Prescriptions   Pending Prescriptions Disp Refills     amLODIPine (NORVASC) 10 MG tablet 90 tablet 1     Sig: Take 1 tablet (10 mg) by mouth daily   Last Written Prescription Date:  7-29-20  Last Fill Quantity: 90,  # refills: 1   Last office visit: 9/24/2019 with prescribing provider:  9-24-19   Future Office Visit:            There is no refill protocol information for this order

## 2020-08-21 RX ORDER — LOSARTAN POTASSIUM 100 MG/1
100 TABLET ORAL DAILY
Qty: 30 TABLET | Refills: 0 | Status: SHIPPED | OUTPATIENT
Start: 2020-08-21 | End: 2022-04-05

## 2020-08-21 RX ORDER — AMLODIPINE BESYLATE 10 MG/1
10 TABLET ORAL DAILY
Qty: 30 TABLET | Refills: 0 | Status: SHIPPED | OUTPATIENT
Start: 2020-08-21 | End: 2022-04-05

## 2020-08-21 NOTE — TELEPHONE ENCOUNTER
"Routing refill request to provider for review/approval because:  Labs not current:  Cr, K+  Patient needs to be seen because:  Pt overdue for follow up    Medication pended for approval, 30 day supply with reminder.   second reminder            Requested Prescriptions   Pending Prescriptions Disp Refills     losartan (COZAAR) 100 MG tablet [Pharmacy Med Name: Losartan Potassium Oral Tablet 100 MG] 30 tablet 0     Sig: Take 1 tablet (100 mg) by mouth daily --OVERDUE for follow up appointment, please schedule prior to further refills.       Angiotensin-II Receptors Failed - 8/18/2020 12:31 PM        Failed - Normal serum creatinine on file in past 12 months     Recent Labs   Lab Test 03/27/18  1012   CR 1.11       Ok to refill medication if creatinine is low          Failed - Normal serum potassium on file in past 12 months     Recent Labs   Lab Test 03/27/18  1012   POTASSIUM 4.1                    Passed - Last blood pressure under 140/90 in past 12 months     BP Readings from Last 3 Encounters:   09/24/19 134/85   10/29/18 130/88   04/18/18 (!) 132/92                 Passed - Recent (12 mo) or future (30 days) visit within the authorizing provider's specialty     Patient has had an office visit with the authorizing provider or a provider within the authorizing providers department within the previous 12 mos or has a future within next 30 days. See \"Patient Info\" tab in inbasket, or \"Choose Columns\" in Meds & Orders section of the refill encounter.              Passed - Medication is active on med list        Passed - Patient is age 18 or older             "

## 2020-08-21 NOTE — TELEPHONE ENCOUNTER
"Routing refill request to provider for review/approval because:  Labs not current:  cr  Patient needs to be seen because:  Overdue for follow up    Medication pended for approval, 30 day supply with reminder.                 Requested Prescriptions   Pending Prescriptions Disp Refills     amLODIPine (NORVASC) 10 MG tablet 90 tablet 1     Sig: Take 1 tablet (10 mg) by mouth daily       Calcium Channel Blockers Protocol  Failed - 8/18/2020 12:46 PM        Failed - Normal serum creatinine on file in past 12 months     Recent Labs   Lab Test 03/27/18  1012   CR 1.11       Ok to refill medication if creatinine is low          Passed - Blood pressure under 140/90 in past 12 months     BP Readings from Last 3 Encounters:   09/24/19 134/85   10/29/18 130/88   04/18/18 (!) 132/92                 Passed - Recent (12 mo) or future (30 days) visit within the authorizing provider's specialty     Patient has had an office visit with the authorizing provider or a provider within the authorizing providers department within the previous 12 mos or has a future within next 30 days. See \"Patient Info\" tab in inbasket, or \"Choose Columns\" in Meds & Orders section of the refill encounter.              Passed - Medication is active on med list        Passed - Patient is age 18 or older             "

## 2020-11-22 ENCOUNTER — HEALTH MAINTENANCE LETTER (OUTPATIENT)
Age: 55
End: 2020-11-22

## 2021-09-18 ENCOUNTER — HEALTH MAINTENANCE LETTER (OUTPATIENT)
Age: 56
End: 2021-09-18

## 2022-01-08 ENCOUNTER — HEALTH MAINTENANCE LETTER (OUTPATIENT)
Age: 57
End: 2022-01-08

## 2022-04-04 NOTE — PROGRESS NOTES
"  Assessment & Plan       ICD-10-CM    1. Essential hypertension with goal blood pressure less than 140/90  I10 BASIC METABOLIC PANEL     amLODIPine (NORVASC) 10 MG tablet     losartan (COZAAR) 100 MG tablet   2. High cholesterol  E78.00 Lipid panel reflex to direct LDL Fasting     pravastatin (PRAVACHOL) 20 MG tablet   3. Gastroesophageal reflux disease without esophagitis  K21.9 omeprazole (PRILOSEC) 20 MG DR capsule   4. Morbid obesity (H)  E66.01      medical conditions are stable. meds refilled.  Work on Healthy diet and exercise. Getting heart rate elevated for 30 mins most days of week.  Recheck yearly based on labs results.     BMI:   Estimated body mass index is 33.71 kg/m  as calculated from the following:    Height as of this encounter: 1.867 m (6' 1.5\").    Weight as of this encounter: 117.5 kg (259 lb).   Weight management plan: Discussed healthy diet and exercise guidelines      No follow-ups on file.    [facility]   Horacio is a 56 year old who presents for the following health issues     History of Present Illness       Hyperlipidemia:  He presents for follow up of hyperlipidemia.  He is taking medication to lower cholesterol. He is not having myalgia or other side effects to statin medications.    Hypertension: He presents for follow up of hypertension.  He does not check blood pressure  regularly outside of the clinic. Outpatient blood pressures have not been over 140/90. He does not follow a low salt diet.     He eats 4 or more servings of fruits and vegetables daily.He consumes 0 sweetened beverage(s) daily.He exercises with enough effort to increase his heart rate 9 or less minutes per day.  He exercises with enough effort to increase his heart rate 3 or less days per week.   He is taking medications regularly.     GERD stable on omeprazole.         Review of Systems   Constitutional, HEENT, cardiovascular, pulmonary, gi and gu systems " "are negative, except as otherwise noted.      Objective    /80   Pulse 78   Temp 97.1  F (36.2  C) (Tympanic)   Resp 18   Ht 1.867 m (6' 1.5\")   Wt 117.5 kg (259 lb)   SpO2 98%   BMI 33.71 kg/m    Body mass index is 33.71 kg/m .  Physical Exam   GENERAL: healthy, alert and no distress  EYES: Eyes grossly normal to inspection, PERRL and conjunctivae and sclerae normal  HENT: ear canals and TM's normal, nose and mouth without ulcers or lesions  NECK: no adenopathy, no asymmetry, masses, or scars and thyroid normal to palpation  RESP: lungs clear to auscultation - no rales, rhonchi or wheezes  CV: regular rate and rhythm, normal S1 S2, no S3 or S4, no murmur, click or rub, no peripheral edema and peripheral pulses strong  ABDOMEN: soft, nontender, no hepatosplenomegaly, no masses and bowel sounds normal  MS: no gross musculoskeletal defects noted, no edema  NEURO: Normal strength and tone, mentation intact and speech normal  PSYCH: mentation appears normal, affect normal/bright    Labs pending          "

## 2022-04-05 ENCOUNTER — OFFICE VISIT (OUTPATIENT)
Dept: FAMILY MEDICINE | Facility: CLINIC | Age: 57
End: 2022-04-05
Payer: COMMERCIAL

## 2022-04-05 VITALS
TEMPERATURE: 97.1 F | HEART RATE: 78 BPM | OXYGEN SATURATION: 98 % | RESPIRATION RATE: 18 BRPM | DIASTOLIC BLOOD PRESSURE: 80 MMHG | SYSTOLIC BLOOD PRESSURE: 134 MMHG | WEIGHT: 259 LBS | BODY MASS INDEX: 33.24 KG/M2 | HEIGHT: 74 IN

## 2022-04-05 DIAGNOSIS — E66.01 MORBID OBESITY (H): ICD-10-CM

## 2022-04-05 DIAGNOSIS — K21.9 GASTROESOPHAGEAL REFLUX DISEASE WITHOUT ESOPHAGITIS: ICD-10-CM

## 2022-04-05 DIAGNOSIS — E78.00 HIGH CHOLESTEROL: ICD-10-CM

## 2022-04-05 DIAGNOSIS — I10 ESSENTIAL HYPERTENSION WITH GOAL BLOOD PRESSURE LESS THAN 140/90: Primary | ICD-10-CM

## 2022-04-05 LAB
ANION GAP SERPL CALCULATED.3IONS-SCNC: 7 MMOL/L (ref 3–14)
BUN SERPL-MCNC: 11 MG/DL (ref 7–30)
CALCIUM SERPL-MCNC: 9.2 MG/DL (ref 8.5–10.1)
CHLORIDE BLD-SCNC: 104 MMOL/L (ref 94–109)
CHOLEST SERPL-MCNC: 173 MG/DL
CO2 SERPL-SCNC: 27 MMOL/L (ref 20–32)
CREAT SERPL-MCNC: 1.18 MG/DL (ref 0.66–1.25)
FASTING STATUS PATIENT QL REPORTED: YES
GFR SERPL CREATININE-BSD FRML MDRD: 72 ML/MIN/1.73M2
GLUCOSE BLD-MCNC: 109 MG/DL (ref 70–99)
HDLC SERPL-MCNC: 49 MG/DL
LDLC SERPL CALC-MCNC: 103 MG/DL
NONHDLC SERPL-MCNC: 124 MG/DL
POTASSIUM BLD-SCNC: 4 MMOL/L (ref 3.4–5.3)
SODIUM SERPL-SCNC: 138 MMOL/L (ref 133–144)
TRIGL SERPL-MCNC: 107 MG/DL

## 2022-04-05 PROCEDURE — 99214 OFFICE O/P EST MOD 30 MIN: CPT | Performed by: PHYSICIAN ASSISTANT

## 2022-04-05 PROCEDURE — 80061 LIPID PANEL: CPT | Performed by: PHYSICIAN ASSISTANT

## 2022-04-05 PROCEDURE — 80048 BASIC METABOLIC PNL TOTAL CA: CPT | Performed by: PHYSICIAN ASSISTANT

## 2022-04-05 PROCEDURE — 36415 COLL VENOUS BLD VENIPUNCTURE: CPT | Performed by: PHYSICIAN ASSISTANT

## 2022-04-05 RX ORDER — PRAVASTATIN SODIUM 20 MG
20 TABLET ORAL DAILY
Qty: 90 TABLET | Refills: 3 | Status: SHIPPED | OUTPATIENT
Start: 2022-04-05 | End: 2023-04-09

## 2022-04-05 RX ORDER — AMLODIPINE BESYLATE 10 MG/1
10 TABLET ORAL DAILY
Qty: 90 TABLET | Refills: 3 | Status: SHIPPED | OUTPATIENT
Start: 2022-04-05 | End: 2023-04-09

## 2022-04-05 RX ORDER — LOSARTAN POTASSIUM 100 MG/1
100 TABLET ORAL DAILY
Qty: 90 TABLET | Refills: 3 | Status: SHIPPED | OUTPATIENT
Start: 2022-04-05 | End: 2023-04-09

## 2022-04-05 NOTE — RESULT ENCOUNTER NOTE
Mr. Panda,    All of your labs were normal/near normal for you.    Please contact the clinic if you have additional questions.  Thank you.    Sincerely,    Олег Vizcarra PA-C

## 2022-06-06 ENCOUNTER — OFFICE VISIT (OUTPATIENT)
Dept: FAMILY MEDICINE | Facility: CLINIC | Age: 57
End: 2022-06-06
Payer: COMMERCIAL

## 2022-06-06 VITALS
TEMPERATURE: 98.5 F | BODY MASS INDEX: 33.79 KG/M2 | HEART RATE: 87 BPM | RESPIRATION RATE: 14 BRPM | SYSTOLIC BLOOD PRESSURE: 124 MMHG | WEIGHT: 259.6 LBS | DIASTOLIC BLOOD PRESSURE: 79 MMHG | OXYGEN SATURATION: 99 %

## 2022-06-06 DIAGNOSIS — S05.91XA RIGHT EYE INJURY, INITIAL ENCOUNTER: Primary | ICD-10-CM

## 2022-06-06 PROCEDURE — 99213 OFFICE O/P EST LOW 20 MIN: CPT | Performed by: PHYSICIAN ASSISTANT

## 2022-06-06 RX ORDER — ERYTHROMYCIN 5 MG/G
0.5 OINTMENT OPHTHALMIC
Qty: 12.5 G | Refills: 0 | Status: SHIPPED | OUTPATIENT
Start: 2022-06-06 | End: 2022-06-11

## 2022-06-06 ASSESSMENT — PAIN SCALES - GENERAL: PAINLEVEL: NO PAIN (0)

## 2022-06-06 NOTE — PROGRESS NOTES
Assessment & Plan   Problem List Items Addressed This Visit    None     Visit Diagnoses     Right eye injury, initial encounter    -  Primary    Relevant Medications    erythromycin (ROMYCIN) 5 MG/GM ophthalmic ointment    Other Relevant Orders    Adult Eye Referral         Scleral abrasion to the right eye without evidence of open globe injury or changes in vision. No evidence of hyphema, iritis, endophthalmitis or other worrisome process. Will treat with the above and he agreed to follow up with an eye provider in the next 1-2 days for a recheck, sooner prn.    Complete history and physical exam as below. AF with normal VS.    DDx and Dx discussed with and explained to the pt to their satisfaction.  All questions were answered at this time. Pt expressed understanding of and agreement with this dx, tx, and plan. No further workup warranted and standard medication warnings given. I have given the patient a list of pertinent indications for re-evaluation. Will go to the Emergency Department if symptoms worsen or new concerning symptoms arise. Patient left in no apparent distress.     23 minutes spent on the date of the encounter doing chart review, history and exam, documentation and further activities per the note     See Patient Instructions    Return in about 2 days (around 6/8/2022) for a recheck with an eye provider, or call 911/go to an ER anytime if worsening.    ABELINO Farrell  [facility] DARINEL Leonard is a 57 year old who presents for the following health issues  accompanied by his spouse.    HPI     Eye injury. Happened 6/5/22. Wood splinter in eye while chain sawing and chipping trees. It was bleeding. Has not done anything to it. Vision was blurry, but has since improved. The eye was weeping and producing fluid. Tetanus UTD.    Review of Systems   Constitutional, HEENT, cardiovascular, pulmonary, gi and gu systems are negative, except as otherwise noted.       Objective    There were no vitals taken for this visit.  There is no height or weight on file to calculate BMI.  Physical Exam  Vitals and nursing note reviewed.   Constitutional:       General: He is not in acute distress.     Appearance: Normal appearance. He is not diaphoretic.   HENT:      Head: Normocephalic and atraumatic.      Nose: Nose normal.   Eyes:      General: No scleral icterus.        Right eye: No discharge.         Left eye: No discharge.      Extraocular Movements: Extraocular movements intact.      Pupils: Pupils are equal, round, and reactive to light.      Comments: VA: R-20/20, L-20/20 and B-20/16. There is an area of injection, fluorescein dye uptake to the 8:00 position to the cornea and pupil. Negative Batool sign and no foreign body seen.   Pulmonary:      Effort: Pulmonary effort is normal. No respiratory distress.   Skin:     General: Skin is dry.      Coloration: Skin is not jaundiced or pale.   Neurological:      General: No focal deficit present.      Mental Status: He is alert. Mental status is at baseline.   Psychiatric:         Mood and Affect: Mood normal.         Behavior: Behavior normal.

## 2022-06-06 NOTE — PATIENT INSTRUCTIONS
Shaina Leonard,    Thank you for allowing [facility] to manage your care.    You have a scleral injury and I do not see a foreign body in the eye currently. Make an appointment with [facility] or the eye clinic of your choice for a recheck in the next 1-2 days.    If you develop worsening/changing symptoms at any time, please call 911 or go to the emergency department for evaluation.    Your tetanus was updated in 2020.    I sent your prescriptions to your pharmacy.    For your pain, please use Ibuprofen 400mg four times daily with food. Between ibuprofen doses, you may use Tylenol 650mg.     Max acetaminophen (Tylenol) 4,000mg/24 hours  Max ibuprofen 3,200mg/24 hours    If you have any questions or concerns, please feel free to call us at [phone]    Sincerely,    Cody Rodriguez PA-C    Did you know?      You can schedule a video visit for follow-up appointments as well as future appointments for certain conditions.  Please see the below link.     [email or web address]    If you have not already done so,  I encourage you to sign up for [facility] ([email or web address]).  This will allow you to review your results, securely communicate with a provider, and schedule virtual visits as well.

## 2022-11-20 ENCOUNTER — HEALTH MAINTENANCE LETTER (OUTPATIENT)
Age: 57
End: 2022-11-20

## 2023-04-10 ENCOUNTER — APPOINTMENT (OUTPATIENT)
Dept: CT IMAGING | Facility: HOSPITAL | Age: 58
End: 2023-04-10
Attending: EMERGENCY MEDICINE
Payer: COMMERCIAL

## 2023-04-10 ENCOUNTER — HOSPITAL ENCOUNTER (EMERGENCY)
Facility: HOSPITAL | Age: 58
Discharge: ANOTHER HOSPITAL | End: 2023-04-10
Attending: EMERGENCY MEDICINE | Admitting: EMERGENCY MEDICINE
Payer: COMMERCIAL

## 2023-04-10 VITALS
WEIGHT: 280.8 LBS | DIASTOLIC BLOOD PRESSURE: 96 MMHG | TEMPERATURE: 97.2 F | RESPIRATION RATE: 8 BRPM | HEIGHT: 73 IN | SYSTOLIC BLOOD PRESSURE: 149 MMHG | BODY MASS INDEX: 37.22 KG/M2 | OXYGEN SATURATION: 96 % | HEART RATE: 100 BPM

## 2023-04-10 DIAGNOSIS — I63.9 CEREBROVASCULAR ACCIDENT (CVA), UNSPECIFIED MECHANISM (H): ICD-10-CM

## 2023-04-10 LAB
ANION GAP SERPL CALCULATED.3IONS-SCNC: 16 MMOL/L (ref 7–15)
APTT PPP: 24 SECONDS (ref 22–38)
BASOPHILS # BLD AUTO: 0.1 10E3/UL (ref 0–0.2)
BASOPHILS NFR BLD AUTO: 1 %
BUN SERPL-MCNC: 14.2 MG/DL (ref 6–20)
CALCIUM SERPL-MCNC: 9.6 MG/DL (ref 8.6–10)
CHLORIDE SERPL-SCNC: 102 MMOL/L (ref 98–107)
CREAT SERPL-MCNC: 1.15 MG/DL (ref 0.67–1.17)
DEPRECATED HCO3 PLAS-SCNC: 22 MMOL/L (ref 22–29)
EOSINOPHIL # BLD AUTO: 0.3 10E3/UL (ref 0–0.7)
EOSINOPHIL NFR BLD AUTO: 3 %
ERYTHROCYTE [DISTWIDTH] IN BLOOD BY AUTOMATED COUNT: 11.9 % (ref 10–15)
GFR SERPL CREATININE-BSD FRML MDRD: 74 ML/MIN/1.73M2
GLUCOSE BLDC GLUCOMTR-MCNC: 133 MG/DL (ref 70–99)
GLUCOSE SERPL-MCNC: 138 MG/DL (ref 70–99)
HCT VFR BLD AUTO: 46.3 % (ref 40–53)
HGB BLD-MCNC: 16.5 G/DL (ref 13.3–17.7)
HOLD SPECIMEN: NORMAL
IMM GRANULOCYTES # BLD: 0 10E3/UL
IMM GRANULOCYTES NFR BLD: 1 %
INR PPP: 1.01 (ref 0.85–1.15)
LYMPHOCYTES # BLD AUTO: 1.8 10E3/UL (ref 0.8–5.3)
LYMPHOCYTES NFR BLD AUTO: 21 %
MCH RBC QN AUTO: 33.5 PG (ref 26.5–33)
MCHC RBC AUTO-ENTMCNC: 35.6 G/DL (ref 31.5–36.5)
MCV RBC AUTO: 94 FL (ref 78–100)
MONOCYTES # BLD AUTO: 0.8 10E3/UL (ref 0–1.3)
MONOCYTES NFR BLD AUTO: 9 %
NEUTROPHILS # BLD AUTO: 5.9 10E3/UL (ref 1.6–8.3)
NEUTROPHILS NFR BLD AUTO: 65 %
NRBC # BLD AUTO: 0 10E3/UL
NRBC BLD AUTO-RTO: 0 /100
PLATELET # BLD AUTO: 255 10E3/UL (ref 150–450)
POTASSIUM SERPL-SCNC: 3.8 MMOL/L (ref 3.4–5.3)
RBC # BLD AUTO: 4.93 10E6/UL (ref 4.4–5.9)
SODIUM SERPL-SCNC: 140 MMOL/L (ref 136–145)
TROPONIN T SERPL HS-MCNC: 9 NG/L
WBC # BLD AUTO: 8.9 10E3/UL (ref 4–11)

## 2023-04-10 PROCEDURE — 82962 GLUCOSE BLOOD TEST: CPT

## 2023-04-10 PROCEDURE — 70496 CT ANGIOGRAPHY HEAD: CPT

## 2023-04-10 PROCEDURE — 37195 THROMBOLYTIC THERAPY STROKE: CPT | Performed by: EMERGENCY MEDICINE

## 2023-04-10 PROCEDURE — 80048 BASIC METABOLIC PNL TOTAL CA: CPT | Performed by: EMERGENCY MEDICINE

## 2023-04-10 PROCEDURE — 70498 CT ANGIOGRAPHY NECK: CPT

## 2023-04-10 PROCEDURE — 36415 COLL VENOUS BLD VENIPUNCTURE: CPT | Performed by: EMERGENCY MEDICINE

## 2023-04-10 PROCEDURE — 85025 COMPLETE CBC W/AUTO DIFF WBC: CPT | Performed by: EMERGENCY MEDICINE

## 2023-04-10 PROCEDURE — 99291 CRITICAL CARE FIRST HOUR: CPT | Performed by: EMERGENCY MEDICINE

## 2023-04-10 PROCEDURE — 85730 THROMBOPLASTIN TIME PARTIAL: CPT | Performed by: EMERGENCY MEDICINE

## 2023-04-10 PROCEDURE — 99285 EMERGENCY DEPT VISIT HI MDM: CPT | Mod: 25 | Performed by: EMERGENCY MEDICINE

## 2023-04-10 PROCEDURE — 93010 ELECTROCARDIOGRAM REPORT: CPT | Performed by: INTERNAL MEDICINE

## 2023-04-10 PROCEDURE — 250N000011 HC RX IP 250 OP 636: Performed by: EMERGENCY MEDICINE

## 2023-04-10 PROCEDURE — 85610 PROTHROMBIN TIME: CPT | Performed by: EMERGENCY MEDICINE

## 2023-04-10 PROCEDURE — 93005 ELECTROCARDIOGRAM TRACING: CPT | Performed by: EMERGENCY MEDICINE

## 2023-04-10 PROCEDURE — 70450 CT HEAD/BRAIN W/O DYE: CPT

## 2023-04-10 PROCEDURE — 84484 ASSAY OF TROPONIN QUANT: CPT | Performed by: EMERGENCY MEDICINE

## 2023-04-10 PROCEDURE — 250N000011 HC RX IP 250 OP 636: Performed by: PSYCHIATRY & NEUROLOGY

## 2023-04-10 RX ORDER — HYDRALAZINE HYDROCHLORIDE 20 MG/ML
10 INJECTION INTRAMUSCULAR; INTRAVENOUS EVERY 10 MIN PRN
Status: DISCONTINUED | OUTPATIENT
Start: 2023-04-10 | End: 2023-04-10 | Stop reason: HOSPADM

## 2023-04-10 RX ORDER — SODIUM CHLORIDE 9 MG/ML
INJECTION, SOLUTION INTRAVENOUS CONTINUOUS PRN
Status: DISCONTINUED | OUTPATIENT
Start: 2023-04-10 | End: 2023-04-10 | Stop reason: HOSPADM

## 2023-04-10 RX ORDER — LABETALOL 20 MG/4 ML (5 MG/ML) INTRAVENOUS SYRINGE
10 EVERY 10 MIN PRN
Status: DISCONTINUED | OUTPATIENT
Start: 2023-04-10 | End: 2023-04-10 | Stop reason: HOSPADM

## 2023-04-10 RX ORDER — IOPAMIDOL 755 MG/ML
50 INJECTION, SOLUTION INTRAVASCULAR ONCE
Status: COMPLETED | OUTPATIENT
Start: 2023-04-10 | End: 2023-04-10

## 2023-04-10 RX ADMIN — IOPAMIDOL 50 ML: 755 INJECTION, SOLUTION INTRAVENOUS at 17:40

## 2023-04-10 RX ADMIN — Medication 25 MG: at 18:01

## 2023-04-10 ASSESSMENT — ENCOUNTER SYMPTOMS
ENDOCRINE NEGATIVE: 1
CARDIOVASCULAR NEGATIVE: 1
LIGHT-HEADEDNESS: 1
RESPIRATORY NEGATIVE: 1
GASTROINTESTINAL NEGATIVE: 1
MUSCULOSKELETAL NEGATIVE: 1
HEMATOLOGIC/LYMPHATIC NEGATIVE: 1
DIZZINESS: 1
CONSTITUTIONAL NEGATIVE: 1

## 2023-04-10 ASSESSMENT — ACTIVITIES OF DAILY LIVING (ADL): ADLS_ACUITY_SCORE: 35

## 2023-04-10 NOTE — ED TRIAGE NOTES
About hour and half ago. Started dizzy and right eye was wondering out of socket.  Cant keep balance.  Couldn't sit due to dizziness had to put head down between legs helped for little. Eyes got better.  Having double and blurred vision. No loss of vision or headache.  Never happened before

## 2023-04-10 NOTE — ED PROVIDER NOTES
"  History     Chief Complaint   Patient presents with     Stroke Symptoms     HPI  Horacio Panda is a 57 year old male who comes to the emergency department complaining of dizziness and visual disturbance of sudden onset between 230 and 3:00 PM today.  Patient states he has been in his normal state of good health and then suddenly became very dizzy and developed \"double vision\".  His wife noticed that his right eye \"seem to deviate\".  Patient denies headache.  He states his symptoms are slightly improved but he still feels \"lightheaded\".  He also states that his vision is still blurry.  He has not had fevers chills or cough recently.  He denies pain in his chest or palpitations.  He has had no abdominal pain.  He has not had nausea or vomiting.  He has had no recent change in his bowel or bladder habits.  Patient states at the onset of his symptoms he became quite lightheaded.  At 1 point he was able to sit down and states \"I put my head between my legs\".  He states that seems to have helped.  He relates that he has never had dizziness or a visual disturbance in the past.    Allergies:  Not on File    Problem List:    Patient Active Problem List    Diagnosis Date Noted     Obesity (BMI 35.0-39.9) with comorbidity (H) 09/24/2019     Priority: Medium     Gastroesophageal reflux disease without esophagitis 03/23/2017     Priority: Medium     Hyperplastic colon polyp 11/15/2016     Priority: Medium     Essential hypertension with goal blood pressure less than 140/90 09/12/2016     Priority: Medium     High cholesterol 05/17/2011     Priority: Medium     Status post hip replacement 10/04/2010     Priority: Medium     (Problem list name updated by automated process. Provider to review and confirm.)          Past Medical History:    Past Medical History:   Diagnosis Date     Arthritis, hip      Hyperuricemia        Past Surgical History:    Past Surgical History:   Procedure Laterality Date     ZZC ARTHROPLASTY I-P JT  Age " "26    Lft Hip       Family History:    Family History   Problem Relation Age of Onset     Hypertension Mother      Cancer Father      Hypertension Sister      Cancer Maternal Grandfather      Cerebrovascular Disease Paternal Grandmother      Heart Disease Paternal Grandfather        Social History:  Marital Status:   [2]  Social History     Tobacco Use     Smoking status: Never     Smokeless tobacco: Never   Vaping Use     Vaping status: Never Used   Substance Use Topics     Alcohol use: Yes     Comment: occ     Drug use: No        Medications:    amLODIPine (NORVASC) 10 MG tablet  losartan (COZAAR) 100 MG tablet  omeprazole (PRILOSEC) 20 MG DR capsule  pravastatin (PRAVACHOL) 20 MG tablet  ranitidine (ZANTAC) 300 MG tablet          Review of Systems   Constitutional: Negative.    HENT: Negative.    Eyes: Positive for visual disturbance.   Respiratory: Negative.    Cardiovascular: Negative.    Gastrointestinal: Negative.    Endocrine: Negative.    Genitourinary: Negative.    Musculoskeletal: Negative.    Neurological: Positive for dizziness and light-headedness.        Please see history chief complaint   Hematological: Negative.    All other systems reviewed and they are found unremarkable    Physical Exam   BP: 162/98  Pulse: 94  Temp: 97.2  F (36.2  C)  Resp: 20  Height: 185.4 cm (6' 1\")  Weight: 113.4 kg (250 lb)  SpO2: 97 %      Physical Exam 57-year-old gentleman who is awake alert oriented person place and time.  He is very pleasant and cooperative with my exam.  HEENT normocephalic patient's right eye deviates laterally and superiorly.  With testing of extraocular movements the patient is able to track to the midline but cannot go past the midline when looking medially with his right eye.  Extraocular muscles are intact in the left eye.  Pupils are equally round and reactive.  Anterior chambers are clear.  Tongue midline palate intact oropharynx clear.  No facial asymmetry.  Neck is supple full range " of motion of pain.  No JVD.  Lungs are clear bilaterally.  Heart maintains a regular rate and rhythm S1 and S2 sounds are appreciated.  The abdomen is soft is nontender no mass organomegaly or rebound.  Extremities a full range of motion and 5/5 strength.  Wrist peripheral pulses brisk capillary refill no sensory deficit noted.  Neurologic exam is remarkable for the previously described issue with the patient's extraocular muscles on the right.  No facial asymmetry.  Speech is clear and fluent.  No focal muscle weakness other than as previously described.  Dermatologic exam no diffuse skin rashes or lesions are noted.    ED Course              ED Course as of 04/10/23 1840   Mon Apr 10, 2023   1718 Patient was evaluated immediately upon being placed in room at 1710.  He was made a level 1 stroke activation.  I was contacted immediately by [facility] stroke neurology.   1731 Taken by radiology who reads the patient's noncontrast head CT is negative   1803 Contacted by stroke neurology who evaluated the patient.  TNK has been ordered.  The patient and his wife are from the [facility] and they would prefer that the patient be hospitalized there.  I have reached out to [facility] to see if there is an appropriate bed available.   1832 The patient and his wife related that the comfortable being transferred to [place].  I discussed the case with Dr. Jones, hospitalist on call and Dr. Fraga, stroke neurologist at [facility] who very graciously agreed to accept the patient in transfer.  He will be transferred by [facility].            {EKG done and interpreted by myself.  Shows a normal sinus rhythm.  Ventricular rate 96 bpm.  VA interval is 186 ms.  Corrected  ms.  There is no ST segment elevation or depression.  No inappropriate T wave inversion.  There does not appear to be evidence of acute ischemia.    Critical care time: 60 minutes  The patient has stroke symptoms:          ED Stroke specific documentation           NIHSS PDF     Patient last known well time: 2330-3990  ED Provider first to bedside at: 1710  CT Results received at: 1730    Thrombolytics:   Risks (including potential for bleeding and death), benefits, and alternatives to thrombolytic therapy were discussed with Patient. Tenecteplase (TNK) administered without delay.    If treating with thrombolytics: Ensure SBP<180 and DBP<105 prior to treatment with thrombolytics.  Administering thrombolytics after treatment with IV labetalol, hydralazine, or nicardipine is reasonable once BP control is established.          National Institutes of Health Stroke Scale (Baseline)  Time Performed: 1710     Score    Level of consciousness: (0)   Alert, keenly responsive    LOC questions: (0)   Answers both questions correctly    LOC commands: (0)   Performs both tasks correctly    Best gaze: (1)   Partial gaze palsy    Visual: (0)   No visual loss    Facial palsy: (0)   Normal symmetrical movements    Motor arm (left): (0)   No drift    Motor arm (right): (0)   No drift    Motor leg (left): (0)   No drift    Motor leg (right): (0)   No drift    Limb ataxia: (0)   Absent    Sensory: (0)   Normal- no sensory loss    Best language: (0)   Normal- no aphasia    Dysarthria: (0)   Normal    Extinction and inattention: (0)   No abnormality        Total Score:  1        Stroke Mimics were considered (including migraine headache, seizure disorder, hypoglycemia (or hyperglycemia), head or spinal trauma, CNS infection, Toxin ingestion and shock state (e.g. sepsis) .               Results for orders placed or performed during the hospital encounter of 04/10/23 (from the past 24 hour(s))   Glucose by meter   Result Value Ref Range    GLUCOSE BY METER POCT 133 (H) 70 - 99 mg/dL   [place] Draw    Narrative    The following orders were created for panel order [place] Draw.  Procedure                               Abnormality         Status                      ---------                               -----------         ------                     Extra Blue Top Tube[[id number]]                              In process                 Extra Red Top Tube[[id number]]                                                          Extra Green Top (Lithium...[[id number]]                      In process                 Extra Purple Top Tube[[id number]]                            In process                 Extra Heparinized Syringe[[id number]]                                                     Please view results for these tests on the individual orders.   CBC with Platelets & Differential    Narrative    The following orders were created for panel order CBC with Platelets & Differential.  Procedure                               Abnormality         Status                     ---------                               -----------         ------                     CBC with platelets and d...[[id number]]  Abnormal            Final result                 Please view results for these tests on the individual orders.   Basic metabolic panel   Result Value Ref Range    Sodium 140 136 - 145 mmol/L    Potassium 3.8 3.4 - 5.3 mmol/L    Chloride 102 98 - 107 mmol/L    Carbon Dioxide (CO2) 22 22 - 29 mmol/L    Anion Gap 16 (H) 7 - 15 mmol/L    Urea Nitrogen 14.2 6.0 - 20.0 mg/dL    Creatinine 1.15 0.67 - 1.17 mg/dL    Calcium 9.6 8.6 - 10.0 mg/dL    Glucose 138 (H) 70 - 99 mg/dL    GFR Estimate 74 >60 mL/min/1.73m2   INR   Result Value Ref Range    INR 1.01 0.85 - 1.15   Partial thromboplastin time   Result Value Ref Range    aPTT 24 22 - 38 Seconds   Troponin T, High Sensitivity   Result Value Ref Range    Troponin T, High Sensitivity 9 <=22 ng/L   CBC with platelets and differential   Result Value Ref Range    WBC Count 8.9 4.0 - 11.0 10e3/uL    RBC Count 4.93 4.40 - 5.90 10e6/uL    Hemoglobin 16.5 13.3 - 17.7 g/dL    Hematocrit 46.3 40.0 - 53.0 %    MCV 94 78 - 100 fL    MCH 33.5 (H) 26.5 - 33.0 pg     MCHC 35.6 31.5 - 36.5 g/dL    RDW 11.9 10.0 - 15.0 %    Platelet Count 255 150 - 450 10e3/uL    % Neutrophils 65 %    % Lymphocytes 21 %    % Monocytes 9 %    % Eosinophils 3 %    % Basophils 1 %    % Immature Granulocytes 1 %    NRBCs per 100 WBC 0 <1 /100    Absolute Neutrophils 5.9 1.6 - 8.3 10e3/uL    Absolute Lymphocytes 1.8 0.8 - 5.3 10e3/uL    Absolute Monocytes 0.8 0.0 - 1.3 10e3/uL    Absolute Eosinophils 0.3 0.0 - 0.7 10e3/uL    Absolute Basophils 0.1 0.0 - 0.2 10e3/uL    Absolute Immature Granulocytes 0.0 <=0.4 10e3/uL    Absolute NRBCs 0.0 10e3/uL   CT Head w/o Contrast    Narrative    Exam: CT HEAD W/O CONTRAST      Exam reason: Code Stroke to evaluate for potential thrombolysis and  thrombectomy. PLEASE READ IMMEDIATELY.    Technique:   Axial images of the head obtained without contrast. Coronal and  sagittal reformations were generated. This CT was performed using one  or more of the following dose reduction techniques: automated exposure  control, adjustment of the mA and/or kV according to patient size,  and/or use of iterative reconstruction technique.    Comparison: None.        Findings:      Parenchyma: No evidence of intraparenchymal hemorrhage, mass, acute  cortical infarct or prior infarct in a major vascular territory.      No midline shift. The basal cisterns are patent.    Extra-axial spaces: No extra-axial fluid collection or hemorrhage.     Ventricles: Normal.  Paranasal sinuses: The right maxillary sinus and right frontal sinuses  are opacified. There is scattered mucosal thickening.  Mastoid air cells: Clear.    Osseous: No acute findings.  Orbits: Normal.    Soft tissues: Unremarkable.       Impression    Impression:  No acute intracranial abnormality.    These results were discussed with Dr. Villanueva by Dr. Campos on  4/10/2023 5:31 PM.      LENARD CAMPOS MD         SYSTEM ID:  [id number]   CTA Head Neck with Contrast    Narrative    Exam: CTA HEAD NECK W CONTRAST    Exam reason:  Code Stroke to evaluate for potential thrombolysis and  thrombectomy. PLEASE READ IMMEDIATELY.    Technique:     Using helical CT technique, and IV contrast, thin section arterial  phase axial images of the neck and head ([place], COW) were  performed, down to the level of the aortic arch.  Post-processing 2D  reformatted images of the carotid arteries and cerebral arteries were  performed, including coronal and sagittal reconstructions. MIP  reconstructions of the carotid arteries and cerebral arteries also  performed. This CT was performed using one or more of the following  dose reduction techniques: automated exposure control, adjustment of  the mA and/or kV according to patient size, and/or use of iterative  reconstruction technique.    Meds/Contrast: isovue 370 50mL    Comparison: None.    Findings:    CTA of the neck:     Aortic arch: No significant abnormality of the aortic arch and the  origins of its branch vessels.       Right common carotid artery: No significant stenosis.     Right internal carotid artery: No hemodynamically significant  stenosis.  Mild calcified atherosclerosis.    Left common carotid artery: No significant stenosis.     Left internal carotid artery: No hemodynamically significant stenosis.   Mild calcified atherosclerosis.    Vertebral arteries: No hemodynamically significant stenosis.     * Note: Measurements of stenoses were done in accordance with NASCET  criteria. That is, the stenosis is calculated from the ratio of the  linear luminal diameter of the narrowest segment of the diseased  portion of the artery compared to the diameter of the artery beyond or  distal to any post stenotic dilatation.     CTA of the head ([place], COW):     General: No occlusion, thrombosis, hemodynamically significant  stenosis or aneurysm about the [place].     Anterior communicating artery: Anterior communicating artery is  present.  Posterior communicating arteries:  Congenitally absent or hypoplastic.    Additional Findings:    Intracranial contents: No evidence of acute cortical infarct, infarct  in a major vascular territory, mass or enhancing abnormality.     Soft tissues of the neck: No acute findings.    Visualized lung apices: No consolidation.    Osseous: No acute osseous abnormalities.      Impression    Impression:  No large vessel occlusion.     No hemodynamically carotid or vertebrobasilar stenosis.    No occlusion, stenosis, or aneurysm of the intracranial arteries.    LENARD GAN MD         SYSTEM ID:  [id number]       Medications   sodium chloride 0.9% infusion (has no administration in time range)   labetalol (NORMODYNE/TRANDATE) syringe 10 mg (has no administration in time range)     Or   hydrALAZINE (APRESOLINE) injection 10 mg (has no administration in time range)   niCARdipine 40 mg in 200 mL NS (CARDENE) infusion (has no administration in time range)   iopamidol (ISOVUE-370) solution 50 mL (50 mLs Intravenous $Given 4/10/23 1740)   sodium chloride (PF) 0.9% PF flush 100 mL (100 mLs Intravenous $Given 4/10/23 1741)   tenecteplase (TNKase) injection 25 mg (25 mg Intravenous $Given 4/10/23 1801)       Assessments & Plan (with Medical Decision Making)     I have reviewed the nursing notes.        Medical Decision Making  The patient's presentation was of high complexity (an acute health issue posing potential threat to life or bodily function).    The patient's evaluation involved:  discussion of management or test interpretation with another health professional (see separate area of note for details)    The patient's management necessitated high risk (a decision regarding hospitalization).        New Prescriptions    No medications on file       Final diagnoses:   Cerebrovascular accident (CVA), unspecified mechanism (H)       4/10/2023   [facility]     Robert Villanueva,   04/10/23 [id number]

## 2023-04-10 NOTE — CONSULTS
"[facility]    Stroke Consult Note    Reason for Consult: Stroke Code     Chief Complaint: No chief complaint on file.      HPI  Horacio Panda is a 57 year old male with hx of HTN presents to hospital with double vision blurred vision and dizziness that he noticed 04/10/23 between [id number].     According to spouse they had driven up to their cabin after a 3 hour drive at that time pt was able to get out of the car and went inside the house. Later pt mentioned to spouse that he was having dizziness blurred vision . Spouse noticed that his R eye was in eye socket laterally.     Per pt when he stood out of the car he noticed sudden onset dizziness and notice blurred vision and double vision. He reports it was at 1430 when he noticed that. He has been having difficulty with walking primarily due to double vision.     Imaging Findings  Ct head neg for acute abnormalities  CTA neg for LVO , stenosis or other vascular abnormalities      Intravenous Thrombolysis  Risks (including potential for bleeding and death), benefits, and alternatives to thrombolytic therapy were discussed with Patient and Family. Prior to tenecteplase (TNK) administration, the following issues were addressed:   - other eligibility reason: bedside weight    Endovascular Treatment  Not initiated due to absence of proximal vessel occlusion    Impression   Double vision     Recommendations  - Transfer to ICU for post TNK monitoring  - Use orderset: \"Ischemic Stroke Post-Thrombolytics/Thrombectomy ICU Admission\"  - Neurochecks and vital signs per post-thrombolytic orders and monitor closely for any evidence of CNS hemorrhage, bleeding, or orolingual angioedema  - Goal  / 105  - Hold all antithrombotic and anticoagulant medications for 24 hrs post-thrombolytic  - Hold pharmacologic VTE prophylaxis for 24 hrs post-thrombolytic  - Statin:  PTA statin  - Repeat Head CT 24 hrs post-thrombolytic  - MRI Brain with and without contrast  - " "TTE (with Bubble Study if age 60 yrs or less)  - Telemetry, EKG  - Bedside Glucose Monitoring  - A1c, Lipid Panel, Troponin x 3  - PT/OT/SLP  - Stroke Education  - Euthermia, Euglycemia      Thank you for this consult.      Iban Smith MD  Vascular Neurology    To page me or covering stroke neurology team member, click here: AMCOM  Choose \"On Call\" tab at top, then select \"NEUROLOGY/ALL SITES\" from middle drop-down box, press Enter, then look for \"stroke\" or \"telestroke\" for your site.    ______________________________________________________    Clinically Significant Risk Factors Present on Admission                  # Hypertension: home medication list includes antihypertensive(s)      # Obesity: Estimated body mass index is 32.98 kg/m  as calculated from the following:    Height as of this encounter: 1.854 m (6' 1\").    Weight as of this encounter: 113.4 kg (250 lb).           Past Medical History   Past Medical History:   Diagnosis Date     Arthritis, hip     Lft Hip     Hyperuricemia     to 9.6     Past Surgical History   Past Surgical History:   Procedure Laterality Date     ZZC ARTHROPLASTY I-P JT  Age 26    Lft Hip     Medications   Home Meds  Prior to Admission medications    Medication Sig Start Date End Date Taking? Authorizing Provider   amLODIPine (NORVASC) 10 MG tablet Take 1 tablet (10 mg) by mouth daily 4/9/23   Kenton Richmond PA-C   losartan (COZAAR) 100 MG tablet Take 1 tablet (100 mg) by mouth daily 4/9/23   Kenton Richmond PA-C   omeprazole (PRILOSEC) 20 MG DR capsule TAKE 1 CAPSULE BY MOUTH DAILY FOR 90 DAYS. TAKE 1 HOUR BEFORE A MEAL. 4/9/23   Kenton Richmond PA-C   pravastatin (PRAVACHOL) 20 MG tablet Take 1 tablet (20 mg) by mouth daily 4/9/23   Kenton Richmond PA-C   ranitidine (ZANTAC) 300 MG tablet Take 1 tablet (300 mg) by mouth At Bedtime  Patient not taking: No sig reported 9/24/19   Kenton Richmond PA-C       Scheduled Meds      Infusion Meds      PRN " Meds      Allergies   No Known Allergies  Family History   Family History   Problem Relation Age of Onset     Hypertension Mother      Cancer Father      Hypertension Sister      Cancer Maternal Grandfather      Cerebrovascular Disease Paternal Grandmother      Heart Disease Paternal Grandfather      Social History   Social History     Tobacco Use     Smoking status: Never     Smokeless tobacco: Never   Vaping Use     Vaping status: Never Used   Substance Use Topics     Alcohol use: Yes     Comment: occ     Drug use: No       Review of Systems   Review of systems not obtained due to patient factors - critical condition       PHYSICAL EXAMINATION  Temp:  [97.2  F (36.2  C)] 97.2  F (36.2  C)  Pulse:  [94] 94  Resp:  [20] 20  BP: (162)/(98) 162/98  SpO2:  [97 %] 97 %     Neuro Exam  Mental Status:  alert, oriented x 3, follows commands, speech clear and fluent, naming and repetition normal  Cranial Nerves:  visual fields intact (tested by nurse), facial sensation intact and symmetric (tested by nurse), facial movements symmetric, hearing not formally tested but intact to conversation, no dysarthria, shoulder shrug equal bilaterally, tongue protrusion midline, R eye unable to adduct on left lateral gaze. R eye appeares down and out as well  Motor:  no abnormal movements, able to move all limbs antigravity spontaneously with no signs of hemiparesis observed, no pronator drift  Reflexes:  unable to test (telestroke)  Sensory:  light touch sensation intact and symmetric throughout upper and lower extremities (assessed by nurse), no extinction on double simultaneous stimulation (assessed by nurse)  Coordination:  normal finger-to-nose and heel-to-shin bilaterally without dysmetria, rapid alternating movements symmetric  Station/Gait:  unable to test due to telestroke    Dysphagia Screen  Per Nursing    Stroke Scales    NIHSS  1a. Level of Consciousness 0-->Alert, keenly responsive   1b. LOC Questions 0-->Answers both  questions correctly   1c. LOC Commands 0-->Performs both tasks correctly   2.   Best Gaze 1-->Partial gaze palsy, gaze is abnormal in one or both eyes, but forced deviation or total gaze paresis is not present   3.   Visual 0-->No visual loss   4.   Facial Palsy 0-->Normal symmetrical movements   5a. Motor Arm, Left 0-->No drift, limb holds 90 (or 45) degrees for full 10 secs   5b. Motor Arm, Right 0-->No drift, limb holds 90 (or 45) degrees for full 10 secs   6a. Motor Leg, Left 0-->No drift, leg holds 30 degree position for full 5 secs   6b. Motor Leg, right 0-->No drift, leg holds 30 degree position for full 5 secs   7.   Limb Ataxia 0-->Absent   8.   Sensory 0-->Normal, no sensory loss   9.   Best Language 0-->No aphasia, normal   10. Dysarthria 0-->Normal   11. Extinction and Inattention  0-->No abnormality   Total 1 (04/10/23 4315)       Modified D[place]lin Score (Pre-morbid)  0-No deficits    Imaging  I personally reviewed all imaging; relevant findings per HPI.     Lab Results Data   CBC  No results for input(s): WBC, RBC, HGB, HCT, PLT in the last 168 hours.  Basic Metabolic Panel    No results for input(s): NA, POTASSIUM, CHLORIDE, CO2, BUN, CR, GLC, STAN in the last 168 hours.  Liver Panel  No results for input(s): PROTTOTAL, ALBUMIN, BILITOTAL, ALKPHOS, AST, ALT, BILIDIRECT in the last 168 hours.  INR  No lab results found.   Lipid Profile    Recent Labs   Lab Test 04/05/22  0850 03/27/18  1012 08/29/16  1046 10/27/15  0830   CHOL 173 176 184 152   HDL 49 45 33* 31*   * 98 109* 53   TRIG 107 167* 209* 339*   CHOLHDLRATIO  --   --   --  4.9     A1C  No lab results found.  Troponin  No results for input(s): CTROPT, TROPONINIS, TROPONINI, GHTROP in the last 168 hours.       Stroke Code Data Data   Stroke Code Data  (for stroke code with tele)  Stroke code activated 04/10/23   1710   First stroke provider response 04/10/23   1716   Video start time 04/10/23   1724   Video end time 04/10/23   1754   Last  known normal 04/10/23   1430   Time of discovery  (or onset of symptoms)  04/10/23   1430   Head CT read by Stroke Neuro Dr/Provider 04/10/23   6574   Was stroke code de-escalated? No               Telestroke Service Details  Type of service telemedicine diagnostic assessment of acute neurological changes   Reason telemedicine is appropriate patient requires assessment with a specialist for diagnosis and treatment of neurological symptoms   Mode of transmission secure interactive audio and video communication per Bert   Originating site (patient location) [facility]    Distant site (provider location) Provider remote site       I personally examined and evaluated the patient today. At the time of my evaluation and management the patient was in critical condition today due to diplopia. I personally managed decision to give IV thrombolysis. Key decisions made today included administration  of IVT. I spent a total of 55 minutes providing critical care services, evaluating the patient, directing care and reviewing laboratory values and radiologic reports.

## 2023-04-11 LAB
HOLD SPECIMEN: NORMAL
HOLD SPECIMEN: NORMAL

## 2023-04-15 ENCOUNTER — HEALTH MAINTENANCE LETTER (OUTPATIENT)
Age: 58
End: 2023-04-15

## 2023-04-17 ENCOUNTER — TELEPHONE (OUTPATIENT)
Dept: NEUROLOGY | Facility: CLINIC | Age: 58
End: 2023-04-17
Payer: COMMERCIAL

## 2023-04-17 ENCOUNTER — TRANSCRIBE ORDERS (OUTPATIENT)
Dept: OTHER | Age: 58
End: 2023-04-17

## 2023-04-17 DIAGNOSIS — I63.9 CEREBROVASCULAR ACCIDENT (CVA), UNSPECIFIED MECHANISM (H): Primary | ICD-10-CM

## 2023-04-17 DIAGNOSIS — Z92.82 S/P ADMN TPA IN DIFF FAC W/N LAST 24 HR BEF ADM TO CRNT FAC: ICD-10-CM

## 2023-04-17 NOTE — TELEPHONE ENCOUNTER
LVM for pt to schedule for NGN for new stroke for hospital discharge CVA  Per Elena Dial, can offer him virtual with Dr. Tran.    4/17/23 BD

## 2023-04-17 NOTE — TELEPHONE ENCOUNTER
Health Call Center    Phone Message    May a detailed message be left on voicemail: yes     Reason for Call: Appointment Intake    Referring Provider Name: Rubina Valdez PA-C  @ [facility]  Diagnosis and/or Symptoms: Cerebrovascular accident (CVA), unspecified mechanism (H)  S/P admn tPA in diff fac w/n last 24 hr bef adm to crnt fac    Patient was is the hospital in [place] and has been referred to see a stroke sub specialist. He needs an in person visit. Writer cannot find any in person appointments available.    Please review and contact the patient to discuss scheduling options.    Action Taken: Message routed to:  [facility] Neurology    Travel Screening: Not Applicable

## 2023-04-18 NOTE — TELEPHONE ENCOUNTER
[facility]    Phone Message    May a detailed message be left on voicemail: yes     Reason for Call: Other: Pt is calling because he is wanting to schedule his referral for stroke. Pt states that he did not receive any calls from anyone to schedule. Pt would be open to doing a video- writer is unable to schedule a new stroke video with Marc. Please call Pt to schedule    Action Taken: Message routed to:  [facility] Neurology    Travel Screening: Not Applicable

## 2023-04-18 NOTE — TELEPHONE ENCOUNTER
Spoke with patient, scheduled virtual on 5/19 at 1:15PM with Dr. Tran.     Jamal Street on 4/18/2023 at 5:18 PM

## 2023-04-20 ENCOUNTER — OFFICE VISIT (OUTPATIENT)
Dept: FAMILY MEDICINE | Facility: CLINIC | Age: 58
End: 2023-04-20
Payer: COMMERCIAL

## 2023-04-20 VITALS
HEART RATE: 98 BPM | HEIGHT: 72 IN | RESPIRATION RATE: 16 BRPM | TEMPERATURE: 97.8 F | BODY MASS INDEX: 36.7 KG/M2 | WEIGHT: 271 LBS | OXYGEN SATURATION: 98 % | DIASTOLIC BLOOD PRESSURE: 83 MMHG | SYSTOLIC BLOOD PRESSURE: 153 MMHG

## 2023-04-20 DIAGNOSIS — E66.01 MORBID OBESITY (H): ICD-10-CM

## 2023-04-20 DIAGNOSIS — I10 ESSENTIAL HYPERTENSION WITH GOAL BLOOD PRESSURE LESS THAN 140/90: ICD-10-CM

## 2023-04-20 DIAGNOSIS — I63.9 CEREBROVASCULAR ACCIDENT (CVA), UNSPECIFIED MECHANISM (H): Primary | ICD-10-CM

## 2023-04-20 PROCEDURE — 99214 OFFICE O/P EST MOD 30 MIN: CPT | Performed by: PHYSICIAN ASSISTANT

## 2023-04-20 RX ORDER — ATORVASTATIN CALCIUM 80 MG/1
TABLET, FILM COATED ORAL
COMMUNITY
Start: 2023-04-12 | End: 2023-05-08

## 2023-04-20 RX ORDER — LOSARTAN POTASSIUM 50 MG/1
50 TABLET ORAL DAILY
Qty: 30 TABLET | Refills: 0 | Status: SHIPPED | OUTPATIENT
Start: 2023-04-20 | End: 2023-05-17

## 2023-04-20 RX ORDER — LOSARTAN POTASSIUM 25 MG/1
TABLET ORAL
COMMUNITY
Start: 2023-04-13 | End: 2023-04-20

## 2023-04-20 ASSESSMENT — PAIN SCALES - GENERAL: PAINLEVEL: NO PAIN (0)

## 2023-04-20 NOTE — PROGRESS NOTES
Assessment & Plan       ICD-10-CM    1. Cerebrovascular accident (CVA), unspecified mechanism (H)  I63.9 Occupational Therapy Referral      2. Essential hypertension with goal blood pressure less than 140/90  I10 losartan (COZAAR) 50 MG tablet      3. Morbid obesity (H)  E66.01         Will increase losartan to 50mg daily and recheck blood pressure in 4 wks. warning signs discussed. side effects discussed  Keep follow up  With Neurology.   Referral updated for OT.     [facility]    Telly Leonard is a 57 year old, presenting for the following health issues:  Hospital F/U        4/20/2023     4:14 PM   Additional Questions   Roomed by Tamie   Accompanied by Spouse     HPI       Hospital Follow-up Visit:    Hospital/Nursing Home/ Rehab Facility: [facility]  Date of Admission: 04/10/2023  Date of Discharge: 04/13/2023  Reason(s) for Admission: stroke     Was your hospitalization related to COVID-19? No   Problems taking medications regularly:  None  Medication changes since discharge: None  Problems adhering to non-medication therapy:  None    Summary of hospitalization:  CareEverywhere information obtained and reviewed  Diagnostic Tests/Treatments reviewed.  Follow up needed: neurology  Other Healthcare Providers Involved in Patient s Care:         None  Update since discharge: improved.          Plan of care communicated with patient and family           CVA and here for follow up . Overall doing well.   Mild right vision changes. Was to follow up  With OT but never got call.   Has follow up  With Neurology.   Body strength near back to normal.       Review of Systems   Constitutional, HEENT, cardiovascular, pulmonary, gi and gu systems are negative, except as otherwise noted.      Objective    BP (!) 153/83   Pulse 98   Temp 97.8  F (36.6  C) (Tympanic)   Resp 16   Ht 1.829 m (6')   Wt 122.9 kg (271 lb)   SpO2 98%   BMI 36.75 kg/m    Body mass index is  36.75 kg/m .  Physical Exam   GENERAL: healthy, alert and no distress  RESP: lungs clear to auscultation - no rales, rhonchi or wheezes  CV: regular rate and rhythm, normal S1 S2, no S3 or S4, no murmur, click or rub, no peripheral edema and peripheral pulses strong  MS: no gross musculoskeletal defects noted, no edema    Labs reviewed.

## 2023-04-24 ENCOUNTER — MYC MEDICAL ADVICE (OUTPATIENT)
Dept: FAMILY MEDICINE | Facility: CLINIC | Age: 58
End: 2023-04-24
Payer: COMMERCIAL

## 2023-04-24 DIAGNOSIS — I63.9 CEREBROVASCULAR ACCIDENT (CVA), UNSPECIFIED MECHANISM (H): Primary | ICD-10-CM

## 2023-04-24 DIAGNOSIS — H53.9 VISION CHANGES: ICD-10-CM

## 2023-04-24 NOTE — TELEPHONE ENCOUNTER
Records Requested     April 24, 2023 1:15 PM   [id number]   Facility  [facility]   Outcome 1:18pm Sent request for imaging to be pushed to PACS. _SARABJIT    Marcy Figueroa on 5/15/2023 at 9:23 AM Imaging resolved into PACS. -SARABJIT     RECORDS RECEIVED FROM: Care Everywhere   REASON FOR VISIT: Cerebrovascular accident (CVA), unspecified mechanism   Date of Appt: 5/19/23   NOTES (FOR ALL VISITS) STATUS DETAILS   OFFICE NOTE from referring provider Internal 4/20/23 Олег Vizcarra PA-C  @ [facility]     DISCHARGE SUMMARY from hospital Care Everywhere 4/10/23-4/13/23 Carolina Yadav MD @[facility]    DISCHARGE REPORT from the ER Internal 4/10/23 Robert Villanueva DO @[facility]   MEDICATION LIST Internal    IMAGING  (FOR ALL VISITS)     MRI (HEAD, NECK, SPINE) PACS [facility]  4/11/23 MR Brain   CT (HEAD, NECK, SPINE) Internal [place]  4/10/23 CTA Head Neck  4/10/23 CT Head

## 2023-04-25 ENCOUNTER — MYC MEDICAL ADVICE (OUTPATIENT)
Dept: FAMILY MEDICINE | Facility: CLINIC | Age: 58
End: 2023-04-25
Payer: COMMERCIAL

## 2023-04-25 DIAGNOSIS — I63.9 CEREBROVASCULAR ACCIDENT (CVA), UNSPECIFIED MECHANISM (H): Primary | ICD-10-CM

## 2023-04-25 DIAGNOSIS — Q21.12 PFO (PATENT FORAMEN OVALE): ICD-10-CM

## 2023-04-25 PROCEDURE — 99207 E-CONSULT TO CARDIOLOGY (ADULT OUTPT PROVIDER TO SPECIALIST WRITTEN QUESTION & RESPONSE): CPT | Performed by: PHYSICIAN ASSISTANT

## 2023-04-25 NOTE — TELEPHONE ENCOUNTER
I place order for Zio monitor.     Please help patient make appointment to put this on.     Is to wear it for 14 days.     Олег Vizcarra PA-C

## 2023-04-26 ENCOUNTER — E-CONSULT (OUTPATIENT)
Dept: CARDIOLOGY | Facility: CLINIC | Age: 58
End: 2023-04-26
Payer: COMMERCIAL

## 2023-04-26 PROCEDURE — 99207 PR NO BILLABLE SERVICE THIS VISIT: CPT | Performed by: INTERNAL MEDICINE

## 2023-04-26 NOTE — TELEPHONE ENCOUNTER
Talk to patient on the phone regarding his concerns.  After review of his chart there is a possibility that he has a PFO.  I will do a E consultation with cardiology for guidance if he even needs Holter monitor at this time or if cardiology wants to see him first.  I will get it back to the patient after I hear back from cardiology.    Олег Vizcarra PA-C

## 2023-04-26 NOTE — PROGRESS NOTES
4/26/2023     E-Consult has been denied due to: Complexity of question, needs in-person referral.    Interprofessional consultation requested by:  Олег Vizcarra PA-C      Clinical Question/Purpose: Can you please help me getting the monitor ordered.    Patient assessment and information reviewed:     Recommendations:       The recommendations provided in this E-Consult are based on a review of clinical data pertinent to the clinical question presented, without a review of the patient's complete medical record or, the benefit of a comprehensive in-person or virtual patient evaluation. This consultation should not replace the clinical judgement and evaluation of the provider ordering this E-Consult. Any new clinical issues, or changes in patient status since the filing of this E-Consult will need to be taken into account when assessing these recommendations. Please contact me if you have further questions.    My total time spent reviewing clinical information and formulating assessment was 5 minutes.        Dread Jones MD

## 2023-04-26 NOTE — TELEPHONE ENCOUNTER
Do you have any recommendations on what I can tell patient in regards to this?      Sebastian SCRUGGS [place]

## 2023-04-28 ENCOUNTER — ANCILLARY PROCEDURE (OUTPATIENT)
Dept: CARDIOLOGY | Facility: CLINIC | Age: 58
End: 2023-04-28
Attending: PHYSICIAN ASSISTANT
Payer: COMMERCIAL

## 2023-04-28 DIAGNOSIS — I63.9 CEREBROVASCULAR ACCIDENT (CVA), UNSPECIFIED MECHANISM (H): ICD-10-CM

## 2023-04-28 PROCEDURE — 93246 EXT ECG>7D<15D RECORDING: CPT

## 2023-04-28 PROCEDURE — 93248 EXT ECG>7D<15D REV&INTERPJ: CPT | Performed by: INTERNAL MEDICINE

## 2023-04-28 NOTE — PROGRESS NOTES
Per Олег Vizcarra, patient to have 14-day zio monitor placed.  Diagnosis: CVA unspecified   Monitor placed: Yes  Patient Instructed: Yes  Patient verbalized understanding: Yes  Holter # [id number]    Monitor was placed by ALEJANDRO Vo   9:28 AM

## 2023-05-04 ENCOUNTER — OFFICE VISIT (OUTPATIENT)
Dept: CARDIOLOGY | Facility: CLINIC | Age: 58
End: 2023-05-04
Attending: INTERNAL MEDICINE
Payer: COMMERCIAL

## 2023-05-04 VITALS
DIASTOLIC BLOOD PRESSURE: 87 MMHG | HEART RATE: 82 BPM | SYSTOLIC BLOOD PRESSURE: 133 MMHG | OXYGEN SATURATION: 96 % | WEIGHT: 278.8 LBS | HEIGHT: 73 IN | BODY MASS INDEX: 36.95 KG/M2

## 2023-05-04 DIAGNOSIS — Z86.73 HISTORY OF TIA (TRANSIENT ISCHEMIC ATTACK) AND STROKE: Primary | ICD-10-CM

## 2023-05-04 PROCEDURE — 99204 OFFICE O/P NEW MOD 45 MIN: CPT | Mod: GC | Performed by: INTERNAL MEDICINE

## 2023-05-04 PROCEDURE — G0463 HOSPITAL OUTPT CLINIC VISIT: HCPCS | Performed by: INTERNAL MEDICINE

## 2023-05-04 ASSESSMENT — PAIN SCALES - GENERAL: PAINLEVEL: NO PAIN (0)

## 2023-05-04 NOTE — NURSING NOTE
Chief Complaint   Patient presents with     New Patient     PFO Consultation          Vitals were taken and medications reconciled.     Carlos Whyte, EMT   3:16 PM

## 2023-05-04 NOTE — PATIENT INSTRUCTIONS
You were seen today in the Cardiovascular Clinic at the [facility].      Cardiology Providers you saw during your visit:  Dr. Stone    Recommendations:  Finish up wearing your heart monitor. We will look at the results. We will request the ESTELA images from [place]. Once we have all the testing results then Dr. Stone will be able to make his recommendation for plan of care.    Nursing questions:  TERI Pedroza;  [phone]  For emergencies call 911.      Thank you for your visit today!     Kasia Olivo RN  [facility]  T[facility], [employer] and Watchman Programs  [facility]    Lizbeth Bobby, [facility] Office: [phone]

## 2023-05-04 NOTE — LETTER
5/4/2023      RE: Horacio Panda  [address] [facility] [address]       Dear Colleague,    Thank you for the opportunity to participate in the care of your patient, Horacio Panda, at the [facility] HEART [facility]IN[facility]. Please see a copy of my visit note below.        STRUCTURAL HEART CARE  CARDIOVASCULAR DIVISION    STRUCTURAL CLINIC INITIAL CONSULTATION        PERTINENT CLINICAL HISTORY:     Horacio Panda is a very pleasant 57 year old male with history of stroke referred to our clinic for evaluation and consideration for PFO closure device.  Additional notable medical history of essential (primary) hypertension, hyperlipidemia, gastroesophageal reflux disease without esophagitis.    He was recently admitted for diplopia and nausea 2/2 CVA. He acute developed diplopia and presented to the ED with stroke alert called. TNK was given at 1801 and he was then transferred to [facility]. No LVO on CTA H&N. MRI brain showed tiny right posterior denise infarct. He was started on ASA 24 hours post TNK. TTE done with no PFO. ESTELA performed and did have a PFO but no LV or ASAEL thrombus. After discussion with stroke neurology empiric DOAC started given PFO. His blood pressure was well controlled at time of discharge on lower than previous BP meds. He was evaluated by PT/OT/ST with recommendation for outpatient OT. He was discharged to home with planned follow up with PCP on 4/13/2023.         PAST MEDICAL HISTORY:     Past Medical History:   Diagnosis Date    Arthritis, hip     Lft Hip    Hyperuricemia     to 9.6        PAST SURGICAL HISTORY:     Past Surgical History:   Procedure Laterality Date    ZZC ARTHROPLASTY I-P JT  Age 26    Lft Hip        CURRENT MEDICATIONS:     Current Outpatient Medications   Medication Sig Dispense Refill    amLODIPine (NORVASC) 10 MG tablet Take 1 tablet (10 mg) by mouth daily 30 tablet 0    atorvastatin (LIPITOR) 80 MG tablet        losartan (COZAAR) 50 MG tablet Take 1 tablet (50 mg) by mouth daily 30 tablet 0    omeprazole (PRILOSEC) 20 MG DR capsule TAKE 1 CAPSULE BY MOUTH DAILY FOR 90 DAYS. TAKE 1 HOUR BEFORE A MEAL. 30 capsule 0    rivaroxaban ANTICOAGULANT (XARELTO) 20 MG TABS tablet Take 20 mg by mouth          ALLERGIES:   Not on File     FAMILY HISTORY:     Family History   Problem Relation Age of Onset    Hypertension Mother     Cancer Father     Hypertension Sister     Cancer Maternal Grandfather     Cerebrovascular Disease Paternal Grandmother     Heart Disease Paternal Grandfather         SOCIAL HISTORY:     Social History     Socioeconomic History    Marital status:    Tobacco Use    Smoking status: Never    Smokeless tobacco: Never   Vaping Use    Vaping status: Never Used   Substance and Sexual Activity    Alcohol use: Yes     Comment: occ    Drug use: No    Sexual activity: Yes     Partners: Female     Birth control/protection: Surgical   Other Topics Concern    Parent/sibling w/ CABG, MI or angioplasty before 65F 55M? No        REVIEW OF SYSTEMS:     Constitutional: No fevers or chills  Skin: No new rash or itching  Eyes: No acute change in vision  Ears/Nose/Throat: No purulent rhinorrhea, new hearing loss, or new vertigo  Respiratory: No cough or hemoptysis  Cardiovascular: See HPI  Gastrointestinal: No change in appetite, vomiting, hematemesis or diarrhea  Genitourinary: No dysuria or hematuria  Musculoskeletal: No new back pain, neck pain or muscle pain  Neurologic: No new headaches, focal weakness or behavior changes  Psychiatric: No hallucinations, excessive alcohol consumption or illegal drug usage  Hematologic/Lymphatic/Immunologic: No bleeding, chills, fever, night sweats or weight loss  Endocrine: No new cold intolerance, heat intolerance, polyphagia, polydipsia or polyuria      PHYSICAL EXAMINATION:     /87 (BP Location: Right arm, Patient Position: Sitting, Cuff Size: Adult Large)   Pulse 82   Ht  "1.865 m (6' 1.43\")   Wt 126.5 kg (278 lb 12.8 oz)   SpO2 96%   BMI 36.36 kg/m      GENERAL: No acute distress.  HEENT: EOMI. Sclerae white, not injected. Nares clear. Pharynx without erythema or exudate.   Neck: No adenopathy. No thyromegaly. Symmetrical.   Heart: Regular rate and rhythm. Harsh crescendo decrescendo late peaking systolic murmur with radiation to carotids. Delayed carotid pulses.   Lungs: Clear to auscultation. No ronchi, wheezes, rales.   Abdomen: Soft, nontender, nondistended. Bowel sounds present.  Extremities: No clubbing, cyanosis, or edema.   Neurologic: Alert and oriented to person/place/time, normal speech and affect. No focal deficits.  Skin: No petechiae, purpura or rash.     LABORATORY DATA:     LIPID RESULTS:  Lab Results   Component Value Date    CHOL 173 04/05/2022    CHOL 176 03/27/2018    HDL 49 04/05/2022    HDL 45 03/27/2018     (H) 04/05/2022    LDL 98 03/27/2018    TRIG 107 04/05/2022    TRIG 167 (H) 03/27/2018    CHOLHDLRATIO 4.9 10/27/2015       LIVER ENZYME RESULTS:  Lab Results   Component Value Date    AST 26 08/10/2010    ALT 32 03/08/2011       CBC RESULTS:  Lab Results   Component Value Date    WBC 8.9 04/10/2023    WBC 9.4 10/31/2016    RBC 4.93 04/10/2023    RBC 4.88 10/31/2016    HGB 16.5 04/10/2023    HGB 16.1 10/31/2016    HCT 46.3 04/10/2023    HCT 46.4 10/31/2016    MCV 94 04/10/2023    MCV 95 10/31/2016    MCH 33.5 (H) 04/10/2023    MCH 33.0 10/31/2016    MCHC 35.6 04/10/2023    MCHC 34.7 10/31/2016    RDW 11.9 04/10/2023    RDW 13.0 10/31/2016     04/10/2023     10/31/2016       BMP RESULTS:  Lab Results   Component Value Date     04/10/2023     03/27/2018    POTASSIUM 3.8 04/10/2023    POTASSIUM 4.0 04/05/2022    POTASSIUM 4.1 03/27/2018    CHLORIDE 102 04/10/2023    CHLORIDE 104 04/05/2022    CHLORIDE 106 03/27/2018    CO2 22 04/10/2023    CO2 27 04/05/2022    CO2 22 03/27/2018    ANIONGAP 16 (H) 04/10/2023    ANIONGAP 7 " 2022    ANIONGAP 11 2018     (H) 04/10/2023     (H) 04/10/2023     (H) 2022    GLC 97 2018    BUN 14.2 04/10/2023    BUN 11 2022    BUN 11 2018    CR 1.15 04/10/2023    CR 1.11 2018    GFRESTIMATED 74 04/10/2023    GFRESTIMATED 69 2018    GFRESTBLACK 84 2018    STAN 9.6 04/10/2023    STAN 9.0 2018        A1C RESULTS:  No results found for: A1C    INR RESULTS:  Lab Results   Component Value Date    INR 1.01 04/10/2023          PROCEDURES & FURTHER ASSESSMENTS:     TTE 2023   [address] [address]   Phone [phone]   Fax [phone]                                                                  Transthoracic Echocardiogram Report   Name: EDDI ROSAS                           Study Date: 2023   MRN: [id number]                               Performing Location: [facility] NTICU   : 1965                            Gender: Male   Height: 73 in                              Age: 57 yrs   Weight: 279 lb                             BSA: 2.5 m2   BP: 133/93 mmHg                            HR: 79   Ordering Physician: ISA WOODS   Referring Physician: ARUN PICKENS   Performed By: Jo-Ann Mcnally RDCS   Reason For Study: Stroke     Interpretation Summary   The left ventricular systolic function is normal.   Left ventricular diastolic function is normal.   Normal right ventricular systolic function.   Left atrium is normal in size by volume.   There is no pericardial effusion.   Proximal ascending aorta is mildly enlarged.   There is no evidence of an atrial septal defect or patent foramen ovale by agitated saline injection or color flow doppler.   Doppler findings do not suggest pulmonary hypertension.   Inferior vena cava size normal and collapsibility > 50% normal indicating normal right atrial pressure (3 mm Hg).   No significant valvular disease present.   No prior  echocardiogram available for comparison.       MRI 4/11/2023  1.  Tiny acute infarct in the right posterior denise.   2.  Mild generalized cerebral volume loss.   3.  Inflammatory paranasal sinus disease, as above.       ESTELA 4/12/2023  The left ventricular systolic function is hyperdynamic. Qualitative ejection fraction is >70% (hyperdynamic).   No thrombus seen in the left atrial appendage. ASAEL emptying velocity is preserved.   There is mild mitral regurgitation.   A patent foramen ovale is present.   There is no pericardial effusion.   Minimal aortic atherosclerosis.     LE venous U/S  4/12/2023  IMPRESSION: No deep venous thrombus demonstrated in the bilateral lower extremities.       CTA 4/10/2023  Impression:  No large vessel occlusion.   No hemodynamically carotid or vertebrobasilar stenosis.  No occlusion, stenosis, or aneurysm of the intracranial arteries.     CLINICAL IMPRESSION:     57 year old male with history of stroke referred to our clinic for evaluation and consideration for PFO closure device.  Additional notable medical history of essential (primary) hypertension, hyperlipidemia, gastroesophageal reflux disease without esophagitis.    Plan Summary:  1) PFO:  ROPE score of 4 (38% chance stroke is due to PFO, 12% risk of 2 year recurrence)   - will need ESTELA images to be reviewed from [facility]   - will need to review Holter results   -Presentation of data to the Heart team to assess for PFO closure    Patient was evaluated in clinic with Dr. Stone of interventional cardiology     Frieda Palma MD  Interventional Cardiology Fellow  [phone]  Patient Care Team:  Олег Vizcarra PA-C as PCP - General (Family Medicine)

## 2023-05-04 NOTE — PROGRESS NOTES
STRUCTURAL HEART CARE  CARDIOVASCULAR DIVISION    STRUCTURAL CLINIC INITIAL CONSULTATION        PERTINENT CLINICAL HISTORY:     Horacio Panda is a very pleasant 57 year old male with history of stroke referred to our clinic for evaluation and consideration for PFO closure device.  Additional notable medical history of essential (primary) hypertension, hyperlipidemia, gastroesophageal reflux disease without esophagitis.    He was recently admitted for diplopia and nausea 2/2 CVA. He acute developed diplopia and presented to the ED with stroke alert called. TNK was given at 1801 and he was then transferred to [facility]. No LVO on CTA H&N. MRI brain showed tiny right posterior denise infarct. He was started on ASA 24 hours post TNK. TTE done with no PFO. ESTELA performed and did have a PFO but no LV or ASAEL thrombus. After discussion with stroke neurology empiric DOAC started given PFO. His blood pressure was well controlled at time of discharge on lower than previous BP meds. He was evaluated by PT/OT/ST with recommendation for outpatient OT. He was discharged to home with planned follow up with PCP on 4/13/2023.         PAST MEDICAL HISTORY:     Past Medical History:   Diagnosis Date    Arthritis, hip     Lft Hip    Hyperuricemia     to 9.6        PAST SURGICAL HISTORY:     Past Surgical History:   Procedure Laterality Date    ZZC ARTHROPLASTY I-P JT  Age 26    Lft Hip        CURRENT MEDICATIONS:     Current Outpatient Medications   Medication Sig Dispense Refill    amLODIPine (NORVASC) 10 MG tablet Take 1 tablet (10 mg) by mouth daily 30 tablet 0    atorvastatin (LIPITOR) 80 MG tablet       losartan (COZAAR) 50 MG tablet Take 1 tablet (50 mg) by mouth daily 30 tablet 0    omeprazole (PRILOSEC) 20 MG DR capsule TAKE 1 CAPSULE BY MOUTH DAILY FOR 90 DAYS. TAKE 1 HOUR BEFORE A MEAL. 30 capsule 0    rivaroxaban ANTICOAGULANT (XARELTO) 20 MG TABS tablet Take 20 mg by mouth          ALLERGIES:   Not on File     FAMILY HISTORY:  "    Family History   Problem Relation Age of Onset    Hypertension Mother     Cancer Father     Hypertension Sister     Cancer Maternal Grandfather     Cerebrovascular Disease Paternal Grandmother     Heart Disease Paternal Grandfather         SOCIAL HISTORY:     Social History     Socioeconomic History    Marital status:    Tobacco Use    Smoking status: Never    Smokeless tobacco: Never   Vaping Use    Vaping status: Never Used   Substance and Sexual Activity    Alcohol use: Yes     Comment: occ    Drug use: No    Sexual activity: Yes     Partners: Female     Birth control/protection: Surgical   Other Topics Concern    Parent/sibling w/ CABG, MI or angioplasty before 65F 55M? No        REVIEW OF SYSTEMS:     Constitutional: No fevers or chills  Skin: No new rash or itching  Eyes: No acute change in vision  Ears/Nose/Throat: No purulent rhinorrhea, new hearing loss, or new vertigo  Respiratory: No cough or hemoptysis  Cardiovascular: See HPI  Gastrointestinal: No change in appetite, vomiting, hematemesis or diarrhea  Genitourinary: No dysuria or hematuria  Musculoskeletal: No new back pain, neck pain or muscle pain  Neurologic: No new headaches, focal weakness or behavior changes  Psychiatric: No hallucinations, excessive alcohol consumption or illegal drug usage  Hematologic/Lymphatic/Immunologic: No bleeding, chills, fever, night sweats or weight loss  Endocrine: No new cold intolerance, heat intolerance, polyphagia, polydipsia or polyuria      PHYSICAL EXAMINATION:     /87 (BP Location: Right arm, Patient Position: Sitting, Cuff Size: Adult Large)   Pulse 82   Ht 1.865 m (6' 1.43\")   Wt 126.5 kg (278 lb 12.8 oz)   SpO2 96%   BMI 36.36 kg/m      GENERAL: No acute distress.  HEENT: EOMI. Sclerae white, not injected. Nares clear. Pharynx without erythema or exudate.   Neck: No adenopathy. No thyromegaly. Symmetrical.   Heart: Regular rate and rhythm. Harsh crescendo decrescendo late peaking " systolic murmur with radiation to carotids. Delayed carotid pulses.   Lungs: Clear to auscultation. No ronchi, wheezes, rales.   Abdomen: Soft, nontender, nondistended. Bowel sounds present.  Extremities: No clubbing, cyanosis, or edema.   Neurologic: Alert and oriented to person/place/time, normal speech and affect. No focal deficits.  Skin: No petechiae, purpura or rash.     LABORATORY DATA:     LIPID RESULTS:  Lab Results   Component Value Date    CHOL 173 04/05/2022    CHOL 176 03/27/2018    HDL 49 04/05/2022    HDL 45 03/27/2018     (H) 04/05/2022    LDL 98 03/27/2018    TRIG 107 04/05/2022    TRIG 167 (H) 03/27/2018    CHOLHDLRATIO 4.9 10/27/2015       LIVER ENZYME RESULTS:  Lab Results   Component Value Date    AST 26 08/10/2010    ALT 32 03/08/2011       CBC RESULTS:  Lab Results   Component Value Date    WBC 8.9 04/10/2023    WBC 9.4 10/31/2016    RBC 4.93 04/10/2023    RBC 4.88 10/31/2016    HGB 16.5 04/10/2023    HGB 16.1 10/31/2016    HCT 46.3 04/10/2023    HCT 46.4 10/31/2016    MCV 94 04/10/2023    MCV 95 10/31/2016    MCH 33.5 (H) 04/10/2023    MCH 33.0 10/31/2016    MCHC 35.6 04/10/2023    MCHC 34.7 10/31/2016    RDW 11.9 04/10/2023    RDW 13.0 10/31/2016     04/10/2023     10/31/2016       BMP RESULTS:  Lab Results   Component Value Date     04/10/2023     03/27/2018    POTASSIUM 3.8 04/10/2023    POTASSIUM 4.0 04/05/2022    POTASSIUM 4.1 03/27/2018    CHLORIDE 102 04/10/2023    CHLORIDE 104 04/05/2022    CHLORIDE 106 03/27/2018    CO2 22 04/10/2023    CO2 27 04/05/2022    CO2 22 03/27/2018    ANIONGAP 16 (H) 04/10/2023    ANIONGAP 7 04/05/2022    ANIONGAP 11 03/27/2018     (H) 04/10/2023     (H) 04/10/2023     (H) 04/05/2022    GLC 97 03/27/2018    BUN 14.2 04/10/2023    BUN 11 04/05/2022    BUN 11 03/27/2018    CR 1.15 04/10/2023    CR 1.11 03/27/2018    GFRESTIMATED 74 04/10/2023    GFRESTIMATED 69 03/27/2018    GFRESTBLACK 84 03/27/2018     STAN 9.6 04/10/2023    STAN 9.0 2018        A1C RESULTS:  No results found for: A1C    INR RESULTS:  Lab Results   Component Value Date    INR 1.01 04/10/2023          PROCEDURES & FURTHER ASSESSMENTS:     TTE 2023   [facility]   [address] [place] [address]   Phone [phone]   Fax [phone]                                                                  Transthoracic Echocardiogram Report   Name: EDDI ROSAS                           Study Date: 2023   MRN: [id number]                               Performing Location: [facility] NT[facility]   : 1965                            Gender: Male   Height: 73 in                              Age: 57 yrs   Weight: 279 lb                             BSA: 2.5 m2   BP: 133/93 mmHg                            HR: 79   Ordering Physician: ISA WOODS   Referring Physician: ARUN PICKENS   Performed By: Jo-Ann Mcnally RDCS   Reason For Study: Stroke     Interpretation Summary   The left ventricular systolic function is normal.   Left ventricular diastolic function is normal.   Normal right ventricular systolic function.   Left atrium is normal in size by volume.   There is no pericardial effusion.   Proximal ascending aorta is mildly enlarged.   There is no evidence of an atrial septal defect or patent foramen ovale by agitated saline injection or color flow doppler.   Doppler findings do not suggest pulmonary hypertension.   Inferior vena cava size normal and collapsibility > 50% normal indicating normal right atrial pressure (3 mm Hg).   No significant valvular disease present.   No prior echocardiogram available for comparison.       MRI 2023  1.  Tiny acute infarct in the right posterior denise.   2.  Mild generalized cerebral volume loss.   3.  Inflammatory paranasal sinus disease, as above.       ESTELA 2023  The left ventricular systolic function is hyperdynamic. Qualitative ejection fraction is >70%  (hyperdynamic).   No thrombus seen in the left atrial appendage. ASAEL emptying velocity is preserved.   There is mild mitral regurgitation.   A patent foramen ovale is present.   There is no pericardial effusion.   Minimal aortic atherosclerosis.     LE venous U/S  4/12/2023  IMPRESSION: No deep venous thrombus demonstrated in the bilateral lower extremities.       CTA 4/10/2023  Impression:  No large vessel occlusion.   No hemodynamically carotid or vertebrobasilar stenosis.  No occlusion, stenosis, or aneurysm of the intracranial arteries.     CLINICAL IMPRESSION:     57 year old male with history of stroke referred to our clinic for evaluation and consideration for PFO closure device.  Additional notable medical history of essential (primary) hypertension, hyperlipidemia, gastroesophageal reflux disease without esophagitis.    Plan Summary:  1) PFO:  ROPE score of 4 (38% chance stroke is due to PFO, 12% risk of 2 year recurrence)   - will need ESTELA images to be reviewed from [facility]   - will need to review Holter results   -Presentation of data to the Heart team to assess for PFO closure    Patient was evaluated in clinic with Dr. Stone of interventional cardiology     Frieda Palma MD  Interventional Cardiology Fellow  [phone]    CC  Patient Care Team:  Олег Vizcarra PA-C as PCP - General (Family Medicine)  OppeОлег ley PA-C as Assigned PCP  Gerri Best MD as MD (Ophthalmology)    Patient seen and examined with Cardiovascular fellow and agree with the assessment and plan described above.     Gilbert Stone M.D.  Interventional Cardiology  [facility]

## 2023-05-08 ENCOUNTER — MYC REFILL (OUTPATIENT)
Dept: FAMILY MEDICINE | Facility: CLINIC | Age: 58
End: 2023-05-08
Payer: COMMERCIAL

## 2023-05-08 DIAGNOSIS — E78.00 HIGH CHOLESTEROL: ICD-10-CM

## 2023-05-08 DIAGNOSIS — I63.9 CEREBROVASCULAR ACCIDENT (CVA), UNSPECIFIED MECHANISM (H): Primary | ICD-10-CM

## 2023-05-08 DIAGNOSIS — I10 ESSENTIAL HYPERTENSION WITH GOAL BLOOD PRESSURE LESS THAN 140/90: ICD-10-CM

## 2023-05-08 RX ORDER — AMLODIPINE BESYLATE 10 MG/1
10 TABLET ORAL DAILY
Qty: 30 TABLET | Refills: 0 | Status: SHIPPED | OUTPATIENT
Start: 2023-05-08 | End: 2023-05-17

## 2023-05-10 RX ORDER — ATORVASTATIN CALCIUM 80 MG/1
80 TABLET, FILM COATED ORAL DAILY
Qty: 90 TABLET | Refills: 1 | Status: SHIPPED | OUTPATIENT
Start: 2023-05-10 | End: 2023-11-07

## 2023-05-16 ENCOUNTER — OFFICE VISIT (OUTPATIENT)
Dept: OPHTHALMOLOGY | Facility: CLINIC | Age: 58
End: 2023-05-16
Attending: OPHTHALMOLOGY
Payer: COMMERCIAL

## 2023-05-16 DIAGNOSIS — I63.9 CEREBROVASCULAR ACCIDENT (CVA), UNSPECIFIED MECHANISM (H): ICD-10-CM

## 2023-05-16 DIAGNOSIS — H49.01: Primary | ICD-10-CM

## 2023-05-16 DIAGNOSIS — H53.9 VISION CHANGES: ICD-10-CM

## 2023-05-16 PROCEDURE — 99203 OFFICE O/P NEW LOW 30 MIN: CPT | Performed by: OPHTHALMOLOGY

## 2023-05-16 PROCEDURE — 92060 SENSORIMOTOR EXAMINATION: CPT | Performed by: OPHTHALMOLOGY

## 2023-05-16 PROCEDURE — G0463 HOSPITAL OUTPT CLINIC VISIT: HCPCS | Performed by: OPHTHALMOLOGY

## 2023-05-16 ASSESSMENT — CONF VISUAL FIELD
OS_SUPERIOR_NASAL_RESTRICTION: 0
OD_NORMAL: 1
OD_INFERIOR_TEMPORAL_RESTRICTION: 0
OS_NORMAL: 1
OD_INFERIOR_NASAL_RESTRICTION: 0
OS_SUPERIOR_TEMPORAL_RESTRICTION: 0
OD_SUPERIOR_NASAL_RESTRICTION: 0
OS_INFERIOR_NASAL_RESTRICTION: 0
OD_SUPERIOR_TEMPORAL_RESTRICTION: 0
OS_INFERIOR_TEMPORAL_RESTRICTION: 0
METHOD: COUNTING FINGERS

## 2023-05-16 ASSESSMENT — REFRACTION_WEARINGRX
OD_SPHERE: +1.50
OS_SPHERE: +1.50
OD_CYLINDER: SPHERE
OS_CYLINDER: SPHERE
SPECS_TYPE: READERS

## 2023-05-16 ASSESSMENT — TONOMETRY
OS_IOP_MMHG: 14
IOP_METHOD: ICARE
OD_IOP_MMHG: 14

## 2023-05-16 ASSESSMENT — VISUAL ACUITY
METHOD: SNELLEN - LINEAR
OD_SC: 20/20
OS_SC: 20/20

## 2023-05-16 NOTE — NURSING NOTE
"Chief Complaint(s) and History of Present Illness(es)     Diplopia Evaluation            Laterality: both eyes    Associated symptoms: Negative for unequal pupil size, eye pain, blurred vision and color vision changes    Treatments tried: patching    Comments: Cerebrocortex stroke 3 weeks ago. Has affected balance and ability to move RE. RE was fixed in down and out position. Had to wear an eye patch until about 1 week ago. Diplopia and balance has gotten much better. Only notes double when looking in left gaze. Pt is currently in a \"livable\" situation with his vision but thought he should still come to this appointment. No ptosis or pupil changes   Upcoming heart surgery to repair hole.           Comments    Inf: pt    DATE/TIME: 4/11/2023 12:25 AM     INDICATION: Neuro deficit, acute, stroke suspected   COMPARISON: Outside CT head/CTA head neck, no report   TECHNIQUE: Routine multiplanar multisequence head MRI without intravenous contrast.     FINDINGS:   INTRACRANIAL CONTENTS: 2 mm infarct in the right posterior denise (series 4 image 51). No mass, acute hemorrhage, or extra-axial fluid collections. A couple foci of nonspecific T2/FLAIR hyperintensity within the white matter are of doubtful clinical significance and are within the range of expected for a patient of this age. Signal intensity of the brain parenchyma is otherwise normal. Mild generalized cerebral atrophy. No hydrocephalus. Normal position of the cerebellar tonsils.     SELLA: No abnormality accounting for technique.     OSSEOUS STRUCTURES/SOFT TISSUES: Normal marrow signal. The major intracranial vascular flow voids are maintained.     ORBITS: No abnormality accounting for technique.     SINUSES/MASTOIDS: Diffuse mucosal thickening of the paranasal sinuses with near complete opacification of the right frontal and maxillary sinuses. No middle ear or mastoid effusion.     IMPRESSION:   1.  Tiny acute infarct in the right posterior denise.   2.  Mild " generalized cerebral volume loss.   3.  Inflammatory paranasal sinus disease, as above.

## 2023-05-17 ENCOUNTER — OFFICE VISIT (OUTPATIENT)
Dept: FAMILY MEDICINE | Facility: CLINIC | Age: 58
End: 2023-05-17
Payer: COMMERCIAL

## 2023-05-17 VITALS
TEMPERATURE: 97 F | HEART RATE: 101 BPM | HEIGHT: 73 IN | BODY MASS INDEX: 35.78 KG/M2 | OXYGEN SATURATION: 98 % | WEIGHT: 270 LBS | DIASTOLIC BLOOD PRESSURE: 84 MMHG | RESPIRATION RATE: 18 BRPM | SYSTOLIC BLOOD PRESSURE: 136 MMHG

## 2023-05-17 DIAGNOSIS — I10 ESSENTIAL HYPERTENSION WITH GOAL BLOOD PRESSURE LESS THAN 140/90: Primary | ICD-10-CM

## 2023-05-17 DIAGNOSIS — I63.9 CEREBROVASCULAR ACCIDENT (CVA), UNSPECIFIED MECHANISM (H): ICD-10-CM

## 2023-05-17 DIAGNOSIS — Q21.12 PFO (PATENT FORAMEN OVALE): ICD-10-CM

## 2023-05-17 PROCEDURE — 99213 OFFICE O/P EST LOW 20 MIN: CPT | Performed by: PHYSICIAN ASSISTANT

## 2023-05-17 RX ORDER — LOSARTAN POTASSIUM 50 MG/1
50 TABLET ORAL DAILY
Qty: 90 TABLET | Refills: 1 | Status: SHIPPED | OUTPATIENT
Start: 2023-05-17 | End: 2023-12-07

## 2023-05-17 RX ORDER — AMLODIPINE BESYLATE 10 MG/1
10 TABLET ORAL DAILY
Qty: 90 TABLET | Refills: 1 | Status: SHIPPED | OUTPATIENT
Start: 2023-05-17 | End: 2023-12-07

## 2023-05-17 ASSESSMENT — PAIN SCALES - GENERAL: PAINLEVEL: NO PAIN (0)

## 2023-05-17 NOTE — PROGRESS NOTES
"  Assessment & Plan       ICD-10-CM    1. Essential hypertension with goal blood pressure less than 140/90  I10 losartan (COZAAR) 50 MG tablet     amLODIPine (NORVASC) 10 MG tablet      2. Cerebrovascular accident (CVA), unspecified mechanism (H)  I63.9       3. PFO (patent foramen ovale)  Q21.12       medical conditions are stable. meds refilled.  Work on Healthy diet and exercise. Getting heart rate elevated for 30 mins most days of week.  con't care with specialists.  Recheck 6 months.       BMI:   Estimated body mass index is 35.62 kg/m  as calculated from the following:    Height as of this encounter: 1.854 m (6' 1\").    Weight as of this encounter: 122.5 kg (270 lb).   Weight management plan: Discussed healthy diet and exercise guidelines      [employer]   Horacio is a 57 year old, presenting for the following health issues:  Hypertension        5/17/2023     8:40 AM   Additional Questions   Roomed by Tamie   Accompanied by self         5/17/2023     8:40 AM   Patient Reported Additional Medications   Patient reports taking the following new medications no new meds     History of Present Illness       Hypertension: He presents for follow up of hypertension.  He does check blood pressure  regularly outside of the clinic. Outpatient blood pressures have not been over 140/90. He does not follow a low salt diet.     He eats 4 or more servings of fruits and vegetables daily.He consumes 1 sweetened beverage(s) daily.He exercises with enough effort to increase his heart rate 9 or less minutes per day.  He exercises with enough effort to increase his heart rate 3 or less days per week.   He is taking medications regularly.   follow up HTN.   Post CVA 4/10/23. Found to have PFO. Has seen cardiology and in process of possible getting this fixed.   Was having double vision and that is self resolving.     Hypertension Follow-up      Do you check your blood " "pressure regularly outside of the clinic? Yes     Are you following a low salt diet? Yes    Are your blood pressures ever more than 140 on the top number (systolic) OR more   than 90 on the bottom number (diastolic), for example 140/90? No  No chest pain or short of breath. No headache or dizziness.         Review of Systems   Constitutional, HEENT, cardiovascular, pulmonary, gi and gu systems are negative, except as otherwise noted.      Objective    /84   Pulse 101   Temp 97  F (36.1  C) (Tympanic)   Resp 18   Ht 1.854 m (6' 1\")   Wt 122.5 kg (270 lb)   SpO2 98%   BMI 35.62 kg/m    Body mass index is 35.62 kg/m .  Physical Exam   GENERAL: healthy, alert and no distress  RESP: lungs clear to auscultation - no rales, rhonchi or wheezes  CV: regular rate and rhythm, normal S1 S2, no S3 or S4, no murmur, click or rub, no peripheral edema and peripheral pulses strong  MS: no gross musculoskeletal defects noted, no edema              "

## 2023-05-19 ENCOUNTER — PRE VISIT (OUTPATIENT)
Dept: NEUROLOGY | Facility: CLINIC | Age: 58
End: 2023-05-19

## 2023-05-19 ENCOUNTER — VIRTUAL VISIT (OUTPATIENT)
Dept: NEUROLOGY | Facility: CLINIC | Age: 58
End: 2023-05-19
Payer: COMMERCIAL

## 2023-05-19 DIAGNOSIS — I63.9 CEREBROVASCULAR ACCIDENT (CVA), UNSPECIFIED MECHANISM (H): ICD-10-CM

## 2023-05-19 DIAGNOSIS — Z92.82 S/P ADMN TPA IN DIFF FAC W/N LAST 24 HR BEF ADM TO CRNT FAC: ICD-10-CM

## 2023-05-19 PROCEDURE — 99205 OFFICE O/P NEW HI 60 MIN: CPT | Mod: VID | Performed by: PSYCHIATRY & NEUROLOGY

## 2023-05-19 NOTE — PROGRESS NOTES
[employer] Neurology Consultation    Horacio Panda MRN# [id number]   Age: 57 year old YOB: 1965     Requesting physician: Олег Lindsey     Reason for Consultation: CVA      History of Presenting Symptoms:   Horacio Panda is a 57 year old male who presents today for evaluation of CVA.    The patient has a pertinent medical history of HLD, HTN, and GERD. He was admitted 4/10/2023 to [facility] after developing acute onset diplopia and nausea.  He had a NIHSS at 1 in the ED, along with SBP of 160's.  CT head was normal. CTA head and neck was without stenosis or occlusion. TNK was given, and he was monitored in an ICU.  MRI brain 4/11/2023 showed 2 mm infarction of right posterior denise, as well as mild nonspecific chronic microvascular white matter changes of the cerebrum. TTE showed no PFO, but ESTELA did show a PFO w/out LV or ASAEL thrombus.  He was started on DOAC (Xarelto), and Lipitor while in the hospital given his CVA was thought to be cardioembolic and related to his PFO.    Cardiology consultation for consideration of PFO closure was done 5/4/2023.  RoPE score 4 indicating 38% chance CVA was due to PFO, 12% risk of 2 year reoccurrence stroke/TIA.  He was to obtain ESTELA for review, and Holter review. Ophthalmology visit 5/16/2023 noted the patient had improving diplopia and still had a right eye gaze deficit (fixed down and out position).    Today, the patient doesn't report having sleep apnea. He doesn't snore, doesn't need naps in the day, he isn't really tired in the day.  He does confirm that his symptoms improved after TNK, but was left with some left over visual issues of diplopia.  He feels he is near 100% in comparison to pre-stroke (he still has minimal diplopia in the left peripheral vision).  He never had facial droop.     Medications:   Amlodipine  Losartan  Omeprazole  Rivaroxaban  Omeprazole     Physical Exam:   General: Seated comfortably in no acute  distress.  HEENT: Neck supple with normal range of motion.   Neurologic:     Mental Status: Fully alert, attentive and oriented. Speech clear and fluent, no paraphasic errors.     Cranial Nerves: EOMI with normal smooth pursuit. Facial movements symmetric. Hearing not formally tested but intact to conversation.  No dysarthria.    STOPBANG is 5 (high blood pressure, over 35 kg/m^2, neck circumference in over 40, male gender) but there is no defined snoring or apnic spells noted.         Data: Pertinent prior to visit   Imaging:  As above    Labs, including hypercoaguable were reviewed today         Assessment and Plan:   Assessment:  CVA, right pontine likely combination of small vessel versus embolic (PFO)    The patient is doing well at this time, and has minimal symptoms, which is likely due to TNK and quick presentation to an ED.  His imaging is suggestive for small punctuate infarction of the brainstem, which is likely reduced in total size due to TNK delivery. Thromboembolic versus cardioembolic strokes can present here, but often I would suspect to see vessel disease as well as other embolic punctate infarction.  Overall, these etiologies cannot be ruled out in favor of small vessel disease, which is why it is reasonable to recommend PFO closure even though his RoPE score is under 7.       Plan:  - LDL under 70  - BP goals under 130/80  - A1c under 7.0  - agree with PFO closure    Follow up in Neurology clinic should new concerns arise.    TK Tran D.O.   of Neurology    Total time today (61 min) in this patient encounter was spent on pre-charting, counseling and/or coordination of care.  The patient is in agreement with this plan and has no further questions.

## 2023-05-19 NOTE — PROGRESS NOTES
Horacio is a 57 year old who is being evaluated via a billable video visit.      How would you like to obtain your AVS? [employer]  If the video visit is dropped, the invitation should be resent by: [phone]        Video-Visit Details    Type of service:  Video Visit     Originating Location (pt. Location): Home    Distant Location (provider location):  On-site  Platform used for Video Visit: [employer]

## 2023-05-19 NOTE — LETTER
5/19/2023       RE: Horacio Panda  [address] [address] [facility] [address]       Dear Colleague,    Thank you for referring your patient, Horacio Panda, to the [facility] NEUROLOGY CL[facility] [place] at [facility]. Please see a copy of my visit note below.    [facility] Neurology Consultation    Horacio Panda MRN# [id number]   Age: 57 year old YOB: 1965     Requesting physician: Олег Lindsey     Reason for Consultation: CVA      History of Presenting Symptoms:   Horacio Panda is a 57 year old male who presents today for evaluation of CVA.    The patient has a pertinent medical history of HLD, HTN, and GERD. He was admitted 4/10/2023 to [facility] after developing acute onset diplopia and nausea.  He had a NIHSS at 1 in the ED, along with SBP of 160's.  CT head was normal. CTA head and neck was without stenosis or occlusion. TNK was given, and he was monitored in an ICU.  MRI brain 4/11/2023 showed 2 mm infarction of right posterior denise, as well as mild nonspecific chronic microvascular white matter changes of the cerebrum. TTE showed no PFO, but ESTELA did show a PFO w/out LV or ASAEL thrombus.  He was started on DOAC (Xarelto), and Lipitor while in the hospital given his CVA was thought to be cardioembolic and related to his PFO.    Cardiology consultation for consideration of PFO closure was done 5/4/2023.  RoPE score 4 indicating 38% chance CVA was due to PFO, 12% risk of 2 year reoccurrence stroke/TIA.  He was to obtain ESTELA for review, and Holter review. Ophthalmology visit 5/16/2023 noted the patient had improving diplopia and still had a right eye gaze deficit (fixed down and out position).    Today, the patient doesn't report having sleep apnea. He doesn't snore, doesn't need naps in the day, he isn't really tired in the day.  He does confirm that his symptoms improved after TNK, but was left with some left  over visual issues of diplopia.  He feels he is near 100% in comparison to pre-stroke (he still has minimal diplopia in the left peripheral vision).  He never had facial droop.     Medications:   Amlodipine  Losartan  Omeprazole  Rivaroxaban  Omeprazole     Physical Exam:   General: Seated comfortably in no acute distress.  HEENT: Neck supple with normal range of motion.   Neurologic:     Mental Status: Fully alert, attentive and oriented. Speech clear and fluent, no paraphasic errors.     Cranial Nerves: EOMI with normal smooth pursuit. Facial movements symmetric. Hearing not formally tested but intact to conversation.  No dysarthria.    STOPBANG is 5 (high blood pressure, over 35 kg/m^2, neck circumference in over 40, male gender) but there is no defined snoring or apnic spells noted.         Data: Pertinent prior to visit   Imaging:  As above    Labs, including hypercoaguable were reviewed today         Assessment and Plan:   Assessment:  CVA, right pontine likely combination of small vessel versus embolic (PFO)    The patient is doing well at this time, and has minimal symptoms, which is likely due to TNK and quick presentation to an ED.  His imaging is suggestive for small punctuate infarction of the brainstem, which is likely reduced in total size due to TNK delivery. Thromboembolic versus cardioembolic strokes can present here, but often I would suspect to see vessel disease as well as other embolic punctate infarction.  Overall, these etiologies cannot be ruled out in favor of small vessel disease, which is why it is reasonable to recommend PFO closure even though his RoPE score is under 7.       Plan:  - LDL under 70  - BP goals under 130/80  - A1c under 7.0  - agree with PFO closure    Follow up in Neurology clinic should new concerns arise.        Total time today (61 min) in this patient encounter was spent on pre-charting, counseling and/or coordination of care.  The patient is in agreement with this  plan and has no further questions.      Horacio is a 57 year old who is being evaluated via a billable video visit.      How would you like to obtain your AVS? MyChart  If the video visit is dropped, the invitation should be resent by: [phone]            Again, thank you for allowing me to participate in the care of your patient.      Sincerely,    Kevin Tran, DO

## 2023-05-29 NOTE — PROGRESS NOTES
"Chief Complaints and History of Present Illnesses   Patient presents with     Diplopia Evaluation     Cerebrocortex stroke 3 weeks ago. Has affected balance and ability to move RE. RE was fixed in down and out position. Had to wear an eye patch until about 1 week ago. Diplopia and balance has gotten much better. Only notes double when looking in left gaze. Pt is currently in a \"livable\" situation with his vision but thought he should still come to this appointment. No ptosis or pupil changes   Upcoming heart surgery to repair hole.    Review of systems for the eyes was negative other than the pertinent positives and negatives noted in the HPI.  History is obtained from the patient and wife    Referring provider: Олег Vizcarra   Primary care: Олег Vizcarra   Assessment   Horacio Xavier is a 58 year old male who presents with:       ICD-10-CM    1. Cranial nerve III palsy, partial, right  H49.01 Sensorimotor      2. Cerebrovascular accident (CVA), unspecified mechanism (H)  I63.9 Adult Eye  Referral      3. Vision changes  H53.9 Adult Eye  Referral            Plan  Mr. Xavier suffered CVA 3 weeks ago which caused right 3rd nerve palsy and diplopia.  This has resolved in all but extreme side gazes.  He will likely continue to improve over next 3-6 months.  Will observe as he is able to function with current situation.  F/u PRN diplopia.       Further details of the management plan can be found in the \"Patient Instructions\" section which was printed and given to the patient at checkout.  No follow-ups on file.   Attending Physician Attestation:  Complete documentation of historical and exam elements from today's encounter can be found in the full encounter summary report (not reduplicated in this progress note).  I personally obtained the chief complaint(s) and history of present illness.  I confirmed and edited as necessary the review of systems, past medical/surgical history, family " history, social history, and examination findings as documented by others; and I examined the patient myself.  I personally reviewed the relevant tests, images, and reports as documented above.  I formulated and edited as necessary the assessment and plan and discussed the findings and management plan with the patient and family. - Gerri Best MD 5/28/2023 11:36 PM

## 2023-06-07 DIAGNOSIS — K21.9 GASTROESOPHAGEAL REFLUX DISEASE WITHOUT ESOPHAGITIS: ICD-10-CM

## 2023-06-08 DIAGNOSIS — I63.9 CEREBROVASCULAR ACCIDENT (CVA), UNSPECIFIED MECHANISM (H): Primary | ICD-10-CM

## 2023-06-08 DIAGNOSIS — Q21.12 PFO (PATENT FORAMEN OVALE): ICD-10-CM

## 2023-06-08 RX ORDER — LIDOCAINE 40 MG/G
CREAM TOPICAL
Status: CANCELLED | OUTPATIENT
Start: 2023-06-08

## 2023-06-08 RX ORDER — SODIUM CHLORIDE 9 MG/ML
INJECTION, SOLUTION INTRAVENOUS CONTINUOUS
Status: CANCELLED | OUTPATIENT
Start: 2023-06-08

## 2023-06-08 RX ORDER — ASPIRIN 81 MG/1
324 TABLET, CHEWABLE ORAL ONCE
Status: CANCELLED | OUTPATIENT
Start: 2023-06-08 | End: 2023-06-08

## 2023-06-08 RX ORDER — CEFAZOLIN SODIUM IN 0.9 % NACL 3 G/100 ML
3 INTRAVENOUS SOLUTION, PIGGYBACK (ML) INTRAVENOUS
Status: CANCELLED | OUTPATIENT
Start: 2023-06-08

## 2023-06-14 NOTE — PROGRESS NOTES
STRUCTURAL HEART CLINIC  H&P    Referring Provider: Dr. Stone    History of Present Illness  Horacio Panda is a 58 year old male who presents for pre-operative H&P in preparation for percutaneous PFO closure on 6/16/23 at [facility].     Patient was admitted to [facility] 4/10/23 with diplopia and nausea, found to have acute CVA. Head neck CTA negative. He received TNK and was transferred to [facility] in [place]. MRI brain showed tiny right posterior denise infarct. He was started on ASA 24 hours post TNK. TTE showed no PFO. Subsequent ESTELA did show PFO without LV or ASAEL thrombus. After discussion with stroke neurology empiric DOAC started given PFO and was referred to structural cardiology for consideration of PFO closure. After review of his ESTELA, he was found to be an appropriate candidate for PFO closure. He was counseled for the above procedure.     Additional medical history is otherwise notable for essential (primary) hypertension, hyperlipidemia, gastroesophageal reflux disease without esophagitis, history of left total hip 20 years ago.     History of blood transfusions/reactions: No   History of abnormal bleeding/anti-platelet use: No   Steroid use in the last year: No   Personal or family history with difficulty with anesthesia: No   Infection precautions: No   Difficulty w/intubation/bedrest: No     Current Medications:  Current Outpatient Medications   Medication Sig Dispense Refill     amLODIPine (NORVASC) 10 MG tablet Take 1 tablet (10 mg) by mouth daily 90 tablet 1     atorvastatin (LIPITOR) 80 MG tablet Take 1 tablet (80 mg) by mouth daily 90 tablet 1     losartan (COZAAR) 50 MG tablet Take 1 tablet (50 mg) by mouth daily 90 tablet 1     omeprazole (PRILOSEC) 20 MG DR capsule TAKE 1 CAPSULE BY MOUTH DAILY FOR 90 DAYS. TAKE 1 HOUR BEFORE A MEAL. 90 capsule 3     rivaroxaban ANTICOAGULANT (XARELTO) 20 MG TABS tablet Take 1 tablet (20 mg) by mouth daily (with dinner)  "90 tablet 0       Allergies:   No Known Allergies    Past Medical History:  Past Medical History:   Diagnosis Date     Arthritis, hip     Lft Hip     Hyperuricemia     to 9.6     Stroke (H) 04/2023    cerebrocortex       Past Surgical History:  Past Surgical History:   Procedure Laterality Date     ZZC ARTHROPLASTY I-P JT  Age 26    Lft Hip       Family History:  Family History   Problem Relation Age of Onset     Hypertension Mother      Cancer Father      Hypertension Sister      Cancer Maternal Grandfather      Cerebrovascular Disease Paternal Grandmother      Heart Disease Paternal Grandfather        Social History:  Social History     Socioeconomic History     Marital status:    Tobacco Use     Smoking status: Never     Smokeless tobacco: Never   Vaping Use     Vaping status: Never Used   Substance and Sexual Activity     Alcohol use: Yes     Comment: occ     Drug use: No     Sexual activity: Yes     Partners: Female     Birth control/protection: Surgical   Other Topics Concern     Parent/sibling w/ CABG, MI or angioplasty before 65F 55M? No       Review of Systems:    Functional status: Independent in ADL's.     Constitutional: No fever, chills, or sweats. No weight gain/loss   ENT: No visual disturbance, ear ache, epistaxis, sore throat  Allergies/Immunologic: Negative.   Respiratory: No cough, hemoptysia  Cardiovascular: As per HPI  GI: No nausea, vomiting, hematemesis, melena, or hematochezia  : No urinary frequency, dysuria, or hematuria  Integument: Negative  Psychiatric: Negative  Neuro: Negative  Endocrinology: Negative   Musculoskeletal: Negative      Physical Exam:  Vitals: /85 (BP Location: Right arm, Patient Position: Supine, Cuff Size: Adult Large)   Pulse 73   Ht 1.88 m (6' 2\")   Wt 124.2 kg (273 lb 14.4 oz)   SpO2 97%   BMI 35.17 kg/m     General: NAD  HEENT:  Dentition intact.    Neck: No jugular venous distension.   Heart: RRR   Lungs: CTA.    Abdomen: Soft, nontender, " nondistended.   Extremities: No clubbing, cyanosis, or edema.  The pulses are +4/4 at the radial, brachial, femoral, popliteal, DP, and PT sites bilaterally.  No bruits are noted.  Neurologic: Alert and oriented to person/place/time, normal speech, gait and affect  Skin: No petechiae, purpura or rash.      Diagnostic Studies:    ECG:  Personally reviewed and interpreted by me.  NSR HR    ESTELA 4/12/23:    Interpretation Summary   Technically difficult study due to challenging sedation.     The left ventricular systolic function is hyperdynamic. Qualitative ejection fraction is >70% (hyperdynamic).   No thrombus seen in the left atrial appendage. ASAEL emptying velocity is preserved.   There is mild mitral regurgitation.   A patent foramen ovale is present.   There is no pericardial effusion.   Minimal aortic atherosclerosis.     Left Ventricle   The left ventricular systolic function is hyperdynamic. Qualitative ejection fraction is >70% (hyperdynamic). Wall motion is normal.     Right Ventricle   Normal right ventricular size, thickness and function. There is no evidence of right ventricular mass or thrombus.     Atria   No thrombus seen in the left atrial appendage. ASAEL emptying velocity is preserved. Left atrium is normal in size. There is no left atrial mass or thrombus.   Right atrium is normal in size. There is no evidence of right atrial mass or thrombus.     Aortic Valve   The aortic valve is tricuspid. No significant aortic insufficiency. There is no aortic stenosis.     Mitral Valve   The mitral valve anatomy is normal. There is mild mitral regurgitation. No mitral valve stenosis.     Tricuspid Valve   The tricuspid valve anatomy is normal. No significant tricuspid regurgitation. There is no tricuspid stenosis.     Pulmonic Valve   Normal pulmonic valve. No significant pulmonic regurgitation. No pulmonic stenosis.     Pericardium/Pleura   There is no pericardial effusion.     Vessels   Minimal aortic  atherosclerosis.     Septae   A patent foramen ovale is present.     Laboratory Studies:  Personally reviewed and interpreted by me.    LIPID RESULTS:  Lab Results   Component Value Date    CHOL 173 04/05/2022    CHOL 176 03/27/2018    HDL 49 04/05/2022    HDL 45 03/27/2018     (H) 04/05/2022    LDL 98 03/27/2018    TRIG 107 04/05/2022    TRIG 167 (H) 03/27/2018    CHOLHDLRATIO 4.9 10/27/2015       LIVER ENZYME RESULTS:  Lab Results   Component Value Date    AST 26 08/10/2010    ALT 32 03/08/2011       CBC RESULTS:  Lab Results   Component Value Date    WBC 8.9 04/10/2023    WBC 9.4 10/31/2016    RBC 4.93 04/10/2023    RBC 4.88 10/31/2016    HGB 16.5 04/10/2023    HGB 16.1 10/31/2016    HCT 46.3 04/10/2023    HCT 46.4 10/31/2016    MCV 94 04/10/2023    MCV 95 10/31/2016    MCH 33.5 (H) 04/10/2023    MCH 33.0 10/31/2016    MCHC 35.6 04/10/2023    MCHC 34.7 10/31/2016    RDW 11.9 04/10/2023    RDW 13.0 10/31/2016     04/10/2023     10/31/2016       BMP RESULTS:  Lab Results   Component Value Date     04/10/2023     03/27/2018    POTASSIUM 3.8 04/10/2023    POTASSIUM 4.0 04/05/2022    POTASSIUM 4.1 03/27/2018    CHLORIDE 102 04/10/2023    CHLORIDE 104 04/05/2022    CHLORIDE 106 03/27/2018    CO2 22 04/10/2023    CO2 27 04/05/2022    CO2 22 03/27/2018    ANIONGAP 16 (H) 04/10/2023    ANIONGAP 7 04/05/2022    ANIONGAP 11 03/27/2018     (H) 04/10/2023     (H) 04/10/2023     (H) 04/05/2022    GLC 97 03/27/2018    BUN 14.2 04/10/2023    BUN 11 04/05/2022    BUN 11 03/27/2018    CR 1.15 04/10/2023    CR 1.11 03/27/2018    GFRESTIMATED 74 04/10/2023    GFRESTIMATED 69 03/27/2018    GFRESTBLACK 84 03/27/2018    STAN 9.6 04/10/2023    STAN 9.0 03/27/2018        A1C RESULTS:  No results found for: A1C    INR RESULTS:  Lab Results   Component Value Date    INR 1.01 04/10/2023       Assessment and Plan   Horacio Panda is a 58 year old male who presents for pre-operative H&P in  preparation for percutaneous PFO closure on 6/16/23 at [facility].     1. Cryptogenic stroke w/PFO:   2. HTN:  3. HLD:  Patient with acute CVA 4/2023, received TNK with resolution of symptoms. Found to have PFO, otherwise no etiology for stroke. He was seen in structural cardiology, found to be an appropriate candidate for PFO closure. He was counseled for the above procedure. We discussed the procedure in detail, including pre and post-procedure care, restrictions and follow-up.     - Holding DOAC since Monday  - Take  mg night before and morning of  - Holding losartan DOS  - Okay to take amlodipine and atorvastatin DOS    All questions answered  Supplies for scrubbing provided  No known contrast dye allergy  No trouble with anesthesia in the past  Labs tomorrow, no s/s of infection    Patient is optimized and is acceptable candidate for the proposed procedure.  No further diagnostic evaluation is needed. Pre-procedure instructions provided in written format.     KALINA Machado, CNP  Structural Heart Care Nurse Practitioner  [facility]  Clinic: [phone]  Pager: [phone]

## 2023-06-14 NOTE — PROGRESS NOTES
Pre-procedure instructions - Coronary Angiogram  Patient Education    1. Your arrival time is 9:00 am on 6/16/23.  Location is [address] Waiting Room  2. Please plan on being at the hospital all day.  3. At any time, emergencies and/or urgent cases may come up which could delay the start of your procedure.    Pre-procedure instructions - Coronary Angiogram  - Shower in the evening before or the morning of the procedure  - No solid food for 8 hours prior and nothing to drink 2 hours prior to arrival time  - You can take your morning medications (except for diabetic and blood thinners) with sips of water.  - Take 325 mg of Asprin 24 hours prior to the procedure and the morning of procedure.   - You will need to arrange a ride to drop you off and pick you up, as you will be unable to drive home.  Prior to discharge you may be required to lay flat for approximately 2-4 hours in the recovery unit to ensure proper clotting of the artery.              Diabetic Medication Instructions  ? Hold oral diabetic medication in morning of your procedure and for 48 hours after IV contrast is given  ? Typical instructions for insulin diabetic medication holding are below. However, please reach out to your Primary Care Provider or Endocrinologist for specific instructions  ? DO NOT take any oral diabetic medication, short-acting diabetes medications/insulin, humalog or regular insulin the morning of your test  ? Take   dose of long-acting insulin (Lantus, Levemir) the day of your test  ? Remember to bring your glucometer and insulin with you to take after your test if needed              Anticoagulation Medication Instructions   rivaroxaban (XARELTO) - Hold 48 hours prior to procedure    You will need to follow up with one of our cardiology APPs 1-2 weeks after your procedure. If you need help scheduling or rescheduling your appointment, please  call [phone]

## 2023-06-15 ENCOUNTER — OFFICE VISIT (OUTPATIENT)
Dept: CARDIOLOGY | Facility: CLINIC | Age: 58
End: 2023-06-15
Attending: NURSE PRACTITIONER
Payer: COMMERCIAL

## 2023-06-15 ENCOUNTER — CARE COORDINATION (OUTPATIENT)
Dept: CARDIOLOGY | Facility: CLINIC | Age: 58
End: 2023-06-15
Payer: COMMERCIAL

## 2023-06-15 VITALS
SYSTOLIC BLOOD PRESSURE: 129 MMHG | HEIGHT: 74 IN | DIASTOLIC BLOOD PRESSURE: 85 MMHG | HEART RATE: 73 BPM | OXYGEN SATURATION: 97 % | BODY MASS INDEX: 35.15 KG/M2 | WEIGHT: 273.9 LBS

## 2023-06-15 DIAGNOSIS — Z87.74 S/P PATENT FORAMEN OVALE CLOSURE: ICD-10-CM

## 2023-06-15 DIAGNOSIS — I63.9 CEREBROVASCULAR ACCIDENT (CVA), UNSPECIFIED MECHANISM (H): ICD-10-CM

## 2023-06-15 DIAGNOSIS — Q21.12 PFO (PATENT FORAMEN OVALE): Primary | ICD-10-CM

## 2023-06-15 DIAGNOSIS — Z01.818 PRE-OP EXAM: Primary | ICD-10-CM

## 2023-06-15 PROCEDURE — G0463 HOSPITAL OUTPT CLINIC VISIT: HCPCS | Performed by: NURSE PRACTITIONER

## 2023-06-15 PROCEDURE — 93010 ELECTROCARDIOGRAM REPORT: CPT | Performed by: INTERNAL MEDICINE

## 2023-06-15 PROCEDURE — 99214 OFFICE O/P EST MOD 30 MIN: CPT | Performed by: NURSE PRACTITIONER

## 2023-06-15 PROCEDURE — 93005 ELECTROCARDIOGRAM TRACING: CPT

## 2023-06-15 ASSESSMENT — PAIN SCALES - GENERAL: PAINLEVEL: NO PAIN (0)

## 2023-06-15 NOTE — NURSING NOTE
Chief Complaint   Patient presents with     Follow Up     Return TAVR- H&P-PFO      Vitals were taken, medications reconciled, and EKG was performed.    Jose F Garcia, ALEJANDRO  10:18 AM

## 2023-06-15 NOTE — PROGRESS NOTES
Check in to the [facility] waiting room at 9:00 am.    Nothing to eat after midnight; water, apple juice or 7up/Sprite is OK up to two hours prior to your scheduled procedure.  You may take your medications in the morning with a sip of water.    Take a shower, with antibacterial soap/wipes, the night before the procedure, and the morning of the procedure.      Diagnosis: PFO  Plan:  1. PFO closure procedure scheduled for 6/16/2023.      2. Medications instructions:  -- Take Aspirin 325 mg the evening before and the morning of.  -- No Xarelto on 6/14, 6/15 & 6/16.  -- Hold your losartan the morning of.  -- Can take all other medications        If any questions please contact:  Kasia Olivo RN  Structural Heart RN Specialists  TAVR, MitraClip and Watchman Programs  [facility]  [phone]

## 2023-06-15 NOTE — PATIENT INSTRUCTIONS
Check in to the [facility] waiting room at 9:00 am.     Nothing to eat after midnight; water, apple juice or 7up/Sprite is OK up to two hours prior to your scheduled procedure.  You may take your medications in the morning with a sip of water.     Take a shower, with antibacterial soap/wipes, the night before the procedure, and the morning of the procedure.        Diagnosis: PFO  Plan:  1. PFO closure procedure scheduled for 6/16/2023.       2. Medications instructions:  -- Take Aspirin 325 mg the evening before and the morning of.  -- No Xarelto on 6/14, 6/15 & 6/16.  -- Hold your losartan the morning of.  -- Can take all other medications     If any questions please contact:  Kasia Olivo RN  Structural Heart RN Specialists  TAVR, MitraClip and Watchman Programs  [facility]  [phone]

## 2023-06-15 NOTE — LETTER
6/15/2023      RE: Horacio Panda  [id number] [facility] [id number]       Dear Colleague,    Thank you for the opportunity to participate in the care of your patient, Horacio Panda, at the [facility] [place] [facility]. Please see a copy of my visit note below.        STRUCTURAL HEART CLINIC  H&P    Referring Provider: Dr. Stone    History of Present Illness  Horacio Panda is a 58 year old male who presents for pre-operative H&P in preparation for percutaneous PFO closure on 6/16/23 at [facility].     Patient was admitted to [facility] 4/10/23 with diplopia and nausea, found to have acute CVA. Head neck CTA negative. He received TNK and was transferred to [facility] in [place]. MRI brain showed tiny right posterior denise infarct. He was started on ASA 24 hours post TNK. TTE showed no PFO. Subsequent ESTELA did show PFO without LV or ASAEL thrombus. After discussion with stroke neurology empiric DOAC started given PFO and was referred to structural cardiology for consideration of PFO closure. After review of his ESTELA, he was found to be an appropriate candidate for PFO closure. He was counseled for the above procedure.     Additional medical history is otherwise notable for essential (primary) hypertension, hyperlipidemia, gastroesophageal reflux disease without esophagitis, history of left total hip 20 years ago.     History of blood transfusions/reactions: No   History of abnormal bleeding/anti-platelet use: No   Steroid use in the last year: No   Personal or family history with difficulty with anesthesia: No   Infection precautions: No   Difficulty w/intubation/bedrest: No     Current Medications:  Current Outpatient Medications   Medication Sig Dispense Refill     amLODIPine (NORVASC) 10 MG tablet Take 1 tablet (10 mg) by mouth daily 90 tablet 1     atorvastatin (LIPITOR) 80 MG tablet Take 1 tablet (80 mg) by  mouth daily 90 tablet 1     losartan (COZAAR) 50 MG tablet Take 1 tablet (50 mg) by mouth daily 90 tablet 1     omeprazole (PRILOSEC) 20 MG DR capsule TAKE 1 CAPSULE BY MOUTH DAILY FOR 90 DAYS. TAKE 1 HOUR BEFORE A MEAL. 90 capsule 3     rivaroxaban ANTICOAGULANT (XARELTO) 20 MG TABS tablet Take 1 tablet (20 mg) by mouth daily (with dinner) 90 tablet 0       Allergies:   No Known Allergies    Past Medical History:  Past Medical History:   Diagnosis Date     Arthritis, hip     Lft Hip     Hyperuricemia     to 9.6     Stroke (H) 04/2023    cerebrocortex       Past Surgical History:  Past Surgical History:   Procedure Laterality Date     ZZC ARTHROPLASTY I-P JT  Age 26    Lft Hip       Family History:  Family History   Problem Relation Age of Onset     Hypertension Mother      Cancer Father      Hypertension Sister      Cancer Maternal Grandfather      Cerebrovascular Disease Paternal Grandmother      Heart Disease Paternal Grandfather        Social History:  Social History     Socioeconomic History     Marital status:    Tobacco Use     Smoking status: Never     Smokeless tobacco: Never   Vaping Use     Vaping status: Never Used   Substance and Sexual Activity     Alcohol use: Yes     Comment: occ     Drug use: No     Sexual activity: Yes     Partners: Female     Birth control/protection: Surgical   Other Topics Concern     Parent/sibling w/ CABG, MI or angioplasty before 65F 55M? No       Review of Systems:    Functional status: Independent in ADL's.     Constitutional: No fever, chills, or sweats. No weight gain/loss   ENT: No visual disturbance, ear ache, epistaxis, sore throat  Allergies/Immunologic: Negative.   Respiratory: No cough, hemoptysia  Cardiovascular: As per HPI  GI: No nausea, vomiting, hematemesis, melena, or hematochezia  : No urinary frequency, dysuria, or hematuria  Integument: Negative  Psychiatric: Negative  Neuro: Negative  Endocrinology: Negative   Musculoskeletal:  "Negative      Physical Exam:  Vitals: /85 (BP Location: Right arm, Patient Position: Supine, Cuff Size: Adult Large)   Pulse 73   Ht 1.88 m (6' 2\")   Wt 124.2 kg (273 lb 14.4 oz)   SpO2 97%   BMI 35.17 kg/m     General: NAD  HEENT:  Dentition intact.    Neck: No jugular venous distension.   Heart: RRR   Lungs: CTA.    Abdomen: Soft, nontender, nondistended.   Extremities: No clubbing, cyanosis, or edema.  The pulses are +4/4 at the radial, brachial, femoral, popliteal, DP, and PT sites bilaterally.  No bruits are noted.  Neurologic: Alert and oriented to person/place/time, normal speech, gait and affect  Skin: No petechiae, purpura or rash.      Diagnostic Studies:    ECG:  Personally reviewed and interpreted by me.  NSR HR    ESTELA 4/12/23:    Interpretation Summary   Technically difficult study due to challenging sedation.     The left ventricular systolic function is hyperdynamic. Qualitative ejection fraction is >70% (hyperdynamic).   No thrombus seen in the left atrial appendage. ASAEL emptying velocity is preserved.   There is mild mitral regurgitation.   A patent foramen ovale is present.   There is no pericardial effusion.   Minimal aortic atherosclerosis.     Left Ventricle   The left ventricular systolic function is hyperdynamic. Qualitative ejection fraction is >70% (hyperdynamic). Wall motion is normal.     Right Ventricle   Normal right ventricular size, thickness and function. There is no evidence of right ventricular mass or thrombus.     Atria   No thrombus seen in the left atrial appendage. ASAEL emptying velocity is preserved. Left atrium is normal in size. There is no left atrial mass or thrombus.   Right atrium is normal in size. There is no evidence of right atrial mass or thrombus.     Aortic Valve   The aortic valve is tricuspid. No significant aortic insufficiency. There is no aortic stenosis.     Mitral Valve   The mitral valve anatomy is normal. There is mild mitral regurgitation. No " mitral valve stenosis.     Tricuspid Valve   The tricuspid valve anatomy is normal. No significant tricuspid regurgitation. There is no tricuspid stenosis.     Pulmonic Valve   Normal pulmonic valve. No significant pulmonic regurgitation. No pulmonic stenosis.     Pericardium/Pleura   There is no pericardial effusion.     Vessels   Minimal aortic atherosclerosis.     Septae   A patent foramen ovale is present.     Laboratory Studies:  Personally reviewed and interpreted by me.    LIPID RESULTS:  Lab Results   Component Value Date    CHOL 173 04/05/2022    CHOL 176 03/27/2018    HDL 49 04/05/2022    HDL 45 03/27/2018     (H) 04/05/2022    LDL 98 03/27/2018    TRIG 107 04/05/2022    TRIG 167 (H) 03/27/2018    CHOLHDLRATIO 4.9 10/27/2015       LIVER ENZYME RESULTS:  Lab Results   Component Value Date    AST 26 08/10/2010    ALT 32 03/08/2011       CBC RESULTS:  Lab Results   Component Value Date    WBC 8.9 04/10/2023    WBC 9.4 10/31/2016    RBC 4.93 04/10/2023    RBC 4.88 10/31/2016    HGB 16.5 04/10/2023    HGB 16.1 10/31/2016    HCT 46.3 04/10/2023    HCT 46.4 10/31/2016    MCV 94 04/10/2023    MCV 95 10/31/2016    MCH 33.5 (H) 04/10/2023    MCH 33.0 10/31/2016    MCHC 35.6 04/10/2023    MCHC 34.7 10/31/2016    RDW 11.9 04/10/2023    RDW 13.0 10/31/2016     04/10/2023     10/31/2016       BMP RESULTS:  Lab Results   Component Value Date     04/10/2023     03/27/2018    POTASSIUM 3.8 04/10/2023    POTASSIUM 4.0 04/05/2022    POTASSIUM 4.1 03/27/2018    CHLORIDE 102 04/10/2023    CHLORIDE 104 04/05/2022    CHLORIDE 106 03/27/2018    CO2 22 04/10/2023    CO2 27 04/05/2022    CO2 22 03/27/2018    ANIONGAP 16 (H) 04/10/2023    ANIONGAP 7 04/05/2022    ANIONGAP 11 03/27/2018     (H) 04/10/2023     (H) 04/10/2023     (H) 04/05/2022    GLC 97 03/27/2018    BUN 14.2 04/10/2023    BUN 11 04/05/2022    BUN 11 03/27/2018    CR 1.15 04/10/2023    CR 1.11 03/27/2018     GFRESTIMATED 74 04/10/2023    GFRESTIMATED 69 03/27/2018    GFRESTBLACK 84 03/27/2018    STAN 9.6 04/10/2023    STAN 9.0 03/27/2018        A1C RESULTS:  No results found for: A1C    INR RESULTS:  Lab Results   Component Value Date    INR 1.01 04/10/2023       Assessment and Plan   Horacio Panda is a 58 year old male who presents for pre-operative H&P in preparation for percutaneous PFO closure on 6/16/23 at [facility].     1. Cryptogenic stroke w/PFO:   2. HTN:  3. HLD:  Patient with acute CVA 4/2023, received TNK with resolution of symptoms. Found to have PFO, otherwise no etiology for stroke. He was seen in structural cardiology, found to be an appropriate candidate for PFO closure. He was counseled for the above procedure. We discussed the procedure in detail, including pre and post-procedure care, restrictions and follow-up.     - Holding DOAC since Monday  - Take  mg night before and morning of  - Holding losartan DOS  - Okay to take amlodipine and atorvastatin DOS    All questions answered  Supplies for scrubbing provided  No known contrast dye allergy  No trouble with anesthesia in the past  Labs tomorrow, no s/s of infection    Patient is optimized and is acceptable candidate for the proposed procedure.  No further diagnostic evaluation is needed. Pre-procedure instructions provided in written format.     KALINA Machado, CNP  Structural Heart Care Nurse Practitioner  [facility]  Clinic: [phone]  Pager: [phone]

## 2023-06-16 ENCOUNTER — HOSPITAL ENCOUNTER (OUTPATIENT)
Facility: CLINIC | Age: 58
Discharge: HOME | End: 2023-06-16
Attending: INTERNAL MEDICINE | Admitting: INTERNAL MEDICINE
Payer: COMMERCIAL

## 2023-06-16 ENCOUNTER — APPOINTMENT (OUTPATIENT)
Dept: CARDIOLOGY | Facility: CLINIC | Age: 58
End: 2023-06-16
Attending: STUDENT IN AN ORGANIZED HEALTH CARE EDUCATION/TRAINING PROGRAM
Payer: COMMERCIAL

## 2023-06-16 ENCOUNTER — APPOINTMENT (OUTPATIENT)
Dept: MEDSURG UNIT | Facility: CLINIC | Age: 58
End: 2023-06-16
Attending: INTERNAL MEDICINE
Payer: COMMERCIAL

## 2023-06-16 ENCOUNTER — APPOINTMENT (OUTPATIENT)
Dept: LAB | Facility: CLINIC | Age: 58
End: 2023-06-16
Attending: INTERNAL MEDICINE
Payer: COMMERCIAL

## 2023-06-16 VITALS
OXYGEN SATURATION: 97 % | TEMPERATURE: 97.7 F | RESPIRATION RATE: 16 BRPM | HEART RATE: 77 BPM | WEIGHT: 273 LBS | SYSTOLIC BLOOD PRESSURE: 149 MMHG | DIASTOLIC BLOOD PRESSURE: 102 MMHG | BODY MASS INDEX: 35.05 KG/M2

## 2023-06-16 DIAGNOSIS — I63.9 CEREBROVASCULAR ACCIDENT (CVA), UNSPECIFIED MECHANISM (H): ICD-10-CM

## 2023-06-16 DIAGNOSIS — Q21.12 PFO (PATENT FORAMEN OVALE): ICD-10-CM

## 2023-06-16 LAB
ACT BLD: 230 SECONDS (ref 74–150)
ACT BLD: 251 SECONDS (ref 74–150)
ANION GAP SERPL CALCULATED.3IONS-SCNC: 12 MMOL/L (ref 7–15)
ATRIAL RATE - MUSE: 67 BPM
BUN SERPL-MCNC: 14.1 MG/DL (ref 6–20)
CALCIUM SERPL-MCNC: 9.2 MG/DL (ref 8.6–10)
CHLORIDE SERPL-SCNC: 105 MMOL/L (ref 98–107)
CREAT SERPL-MCNC: 1.09 MG/DL (ref 0.67–1.17)
DEPRECATED HCO3 PLAS-SCNC: 24 MMOL/L (ref 22–29)
DIASTOLIC BLOOD PRESSURE - MUSE: NORMAL MMHG
ERYTHROCYTE [DISTWIDTH] IN BLOOD BY AUTOMATED COUNT: 11.5 % (ref 10–15)
GFR SERPL CREATININE-BSD FRML MDRD: 79 ML/MIN/1.73M2
GLUCOSE SERPL-MCNC: 106 MG/DL (ref 70–99)
HCT VFR BLD AUTO: 44.4 % (ref 40–53)
HGB BLD-MCNC: 15.6 G/DL (ref 13.3–17.7)
INTERPRETATION ECG - MUSE: NORMAL
LVEF ECHO: NORMAL
MCH RBC QN AUTO: 33.8 PG (ref 26.5–33)
MCHC RBC AUTO-ENTMCNC: 35.1 G/DL (ref 31.5–36.5)
MCV RBC AUTO: 96 FL (ref 78–100)
P AXIS - MUSE: 14 DEGREES
PLATELET # BLD AUTO: 263 10E3/UL (ref 150–450)
POTASSIUM SERPL-SCNC: 4.4 MMOL/L (ref 3.4–5.3)
PR INTERVAL - MUSE: 170 MS
QRS DURATION - MUSE: 90 MS
QT - MUSE: 390 MS
QTC - MUSE: 412 MS
R AXIS - MUSE: 19 DEGREES
RBC # BLD AUTO: 4.61 10E6/UL (ref 4.4–5.9)
SODIUM SERPL-SCNC: 141 MMOL/L (ref 136–145)
SYSTOLIC BLOOD PRESSURE - MUSE: NORMAL MMHG
T AXIS - MUSE: 13 DEGREES
VENTRICULAR RATE- MUSE: 67 BPM
WBC # BLD AUTO: 6.2 10E3/UL (ref 4–11)

## 2023-06-16 PROCEDURE — 85347 COAGULATION TIME ACTIVATED: CPT

## 2023-06-16 PROCEDURE — 93308 TTE F-UP OR LMTD: CPT

## 2023-06-16 PROCEDURE — 93321 DOPPLER ECHO F-UP/LMTD STD: CPT | Mod: 26 | Performed by: INTERNAL MEDICINE

## 2023-06-16 PROCEDURE — 999N000054 HC STATISTIC EKG NON-CHARGEABLE

## 2023-06-16 PROCEDURE — 999N000134 HC STATISTIC PP CARE STAGE 3

## 2023-06-16 PROCEDURE — C1894 INTRO/SHEATH, NON-LASER: HCPCS | Performed by: INTERNAL MEDICINE

## 2023-06-16 PROCEDURE — C1817 SEPTAL DEFECT IMP SYS: HCPCS | Performed by: INTERNAL MEDICINE

## 2023-06-16 PROCEDURE — 99153 MOD SED SAME PHYS/QHP EA: CPT | Performed by: INTERNAL MEDICINE

## 2023-06-16 PROCEDURE — 93662 INTRACARDIAC ECG (ICE): CPT | Performed by: INTERNAL MEDICINE

## 2023-06-16 PROCEDURE — 250N000013 HC RX MED GY IP 250 OP 250 PS 637: Performed by: STUDENT IN AN ORGANIZED HEALTH CARE EDUCATION/TRAINING PROGRAM

## 2023-06-16 PROCEDURE — 250N000011 HC RX IP 250 OP 636: Performed by: INTERNAL MEDICINE

## 2023-06-16 PROCEDURE — 999N000142 HC STATISTIC PROCEDURE PREP ONLY

## 2023-06-16 PROCEDURE — 93005 ELECTROCARDIOGRAM TRACING: CPT

## 2023-06-16 PROCEDURE — C1760 CLOSURE DEV, VASC: HCPCS | Performed by: INTERNAL MEDICINE

## 2023-06-16 PROCEDURE — 272N000001 HC OR GENERAL SUPPLY STERILE: Performed by: INTERNAL MEDICINE

## 2023-06-16 PROCEDURE — 93580 TRANSCATH CLOSURE OF ASD: CPT | Performed by: INTERNAL MEDICINE

## 2023-06-16 PROCEDURE — 99152 MOD SED SAME PHYS/QHP 5/>YRS: CPT | Mod: GC | Performed by: INTERNAL MEDICINE

## 2023-06-16 PROCEDURE — 250N000009 HC RX 250: Performed by: INTERNAL MEDICINE

## 2023-06-16 PROCEDURE — 36415 COLL VENOUS BLD VENIPUNCTURE: CPT | Performed by: NURSE PRACTITIONER

## 2023-06-16 PROCEDURE — 93325 DOPPLER ECHO COLOR FLOW MAPG: CPT

## 2023-06-16 PROCEDURE — C1759 CATH, INTRA ECHOCARDIOGRAPHY: HCPCS | Performed by: INTERNAL MEDICINE

## 2023-06-16 PROCEDURE — 93325 DOPPLER ECHO COLOR FLOW MAPG: CPT | Mod: 26 | Performed by: INTERNAL MEDICINE

## 2023-06-16 PROCEDURE — 99152 MOD SED SAME PHYS/QHP 5/>YRS: CPT | Performed by: INTERNAL MEDICINE

## 2023-06-16 PROCEDURE — 80048 BASIC METABOLIC PNL TOTAL CA: CPT | Performed by: NURSE PRACTITIONER

## 2023-06-16 PROCEDURE — C1769 GUIDE WIRE: HCPCS | Performed by: INTERNAL MEDICINE

## 2023-06-16 PROCEDURE — 93580 TRANSCATH CLOSURE OF ASD: CPT | Mod: GC | Performed by: INTERNAL MEDICINE

## 2023-06-16 PROCEDURE — 85014 HEMATOCRIT: CPT | Performed by: NURSE PRACTITIONER

## 2023-06-16 PROCEDURE — 250N000013 HC RX MED GY IP 250 OP 250 PS 637: Performed by: INTERNAL MEDICINE

## 2023-06-16 PROCEDURE — 93010 ELECTROCARDIOGRAM REPORT: CPT | Performed by: INTERNAL MEDICINE

## 2023-06-16 PROCEDURE — 93308 TTE F-UP OR LMTD: CPT | Mod: 26 | Performed by: INTERNAL MEDICINE

## 2023-06-16 PROCEDURE — C1887 CATHETER, GUIDING: HCPCS | Performed by: INTERNAL MEDICINE

## 2023-06-16 DEVICE — OCCLUDER CARDIOFORM SEPTAL 30MM: Type: IMPLANTABLE DEVICE | Status: FUNCTIONAL

## 2023-06-16 RX ORDER — ASPIRIN 81 MG/1
324 TABLET, CHEWABLE ORAL ONCE
Status: COMPLETED | OUTPATIENT
Start: 2023-06-16 | End: 2023-06-16

## 2023-06-16 RX ORDER — SODIUM CHLORIDE 9 MG/ML
INJECTION, SOLUTION INTRAVENOUS CONTINUOUS
Status: DISCONTINUED | OUTPATIENT
Start: 2023-06-16 | End: 2023-06-17 | Stop reason: HOSPADM

## 2023-06-16 RX ORDER — ONDANSETRON 2 MG/ML
4 INJECTION INTRAMUSCULAR; INTRAVENOUS EVERY 6 HOURS PRN
Status: DISCONTINUED | OUTPATIENT
Start: 2023-06-16 | End: 2023-06-17 | Stop reason: HOSPADM

## 2023-06-16 RX ORDER — NALOXONE HYDROCHLORIDE 0.4 MG/ML
0.4 INJECTION, SOLUTION INTRAMUSCULAR; INTRAVENOUS; SUBCUTANEOUS
Status: DISCONTINUED | OUTPATIENT
Start: 2023-06-16 | End: 2023-06-17 | Stop reason: HOSPADM

## 2023-06-16 RX ORDER — CLOPIDOGREL BISULFATE 75 MG/1
300 TABLET ORAL ONCE
Status: COMPLETED | OUTPATIENT
Start: 2023-06-16 | End: 2023-06-16

## 2023-06-16 RX ORDER — DIPHENHYDRAMINE HYDROCHLORIDE 50 MG/ML
INJECTION INTRAMUSCULAR; INTRAVENOUS
Status: DISCONTINUED | OUTPATIENT
Start: 2023-06-16 | End: 2023-06-17 | Stop reason: HOSPADM

## 2023-06-16 RX ORDER — NALOXONE HYDROCHLORIDE 0.4 MG/ML
0.2 INJECTION, SOLUTION INTRAMUSCULAR; INTRAVENOUS; SUBCUTANEOUS
Status: DISCONTINUED | OUTPATIENT
Start: 2023-06-16 | End: 2023-06-17 | Stop reason: HOSPADM

## 2023-06-16 RX ORDER — CEFAZOLIN SODIUM IN 0.9 % NACL 3 G/100 ML
3 INTRAVENOUS SOLUTION, PIGGYBACK (ML) INTRAVENOUS
Status: COMPLETED | OUTPATIENT
Start: 2023-06-16 | End: 2023-06-16

## 2023-06-16 RX ORDER — CLOPIDOGREL BISULFATE 75 MG/1
75 TABLET ORAL DAILY
Status: DISCONTINUED | OUTPATIENT
Start: 2023-06-17 | End: 2023-06-17 | Stop reason: HOSPADM

## 2023-06-16 RX ORDER — HEPARIN SODIUM 1000 [USP'U]/ML
INJECTION, SOLUTION INTRAVENOUS; SUBCUTANEOUS
Status: DISCONTINUED | OUTPATIENT
Start: 2023-06-16 | End: 2023-06-17 | Stop reason: HOSPADM

## 2023-06-16 RX ORDER — CLOPIDOGREL BISULFATE 75 MG/1
75 TABLET ORAL DAILY
Qty: 90 TABLET | Refills: 3 | Status: SHIPPED | OUTPATIENT
Start: 2023-06-16 | End: 2023-12-11

## 2023-06-16 RX ORDER — PROTAMINE SULFATE 10 MG/ML
INJECTION, SOLUTION INTRAVENOUS
Status: DISCONTINUED | OUTPATIENT
Start: 2023-06-16 | End: 2023-06-17 | Stop reason: HOSPADM

## 2023-06-16 RX ORDER — LIDOCAINE HYDROCHLORIDE 20 MG/ML
5 SOLUTION OROPHARYNGEAL
Status: DISCONTINUED | OUTPATIENT
Start: 2023-06-16 | End: 2023-06-17 | Stop reason: HOSPADM

## 2023-06-16 RX ORDER — ASPIRIN 81 MG/1
81 TABLET ORAL DAILY
Status: DISCONTINUED | OUTPATIENT
Start: 2023-06-17 | End: 2023-06-17 | Stop reason: HOSPADM

## 2023-06-16 RX ORDER — ASPIRIN 81 MG/1
81 TABLET ORAL DAILY
Qty: 90 TABLET | Refills: 3 | Status: SHIPPED | OUTPATIENT
Start: 2023-06-16 | End: 2023-12-11

## 2023-06-16 RX ORDER — ONDANSETRON 4 MG/1
4 TABLET, ORALLY DISINTEGRATING ORAL EVERY 6 HOURS PRN
Status: DISCONTINUED | OUTPATIENT
Start: 2023-06-16 | End: 2023-06-17 | Stop reason: HOSPADM

## 2023-06-16 RX ORDER — FENTANYL CITRATE 50 UG/ML
25 INJECTION, SOLUTION INTRAMUSCULAR; INTRAVENOUS
Status: DISCONTINUED | OUTPATIENT
Start: 2023-06-16 | End: 2023-06-17 | Stop reason: HOSPADM

## 2023-06-16 RX ORDER — LIDOCAINE 40 MG/G
CREAM TOPICAL
Status: DISCONTINUED | OUTPATIENT
Start: 2023-06-16 | End: 2023-06-17 | Stop reason: HOSPADM

## 2023-06-16 RX ORDER — ATROPINE SULFATE 0.1 MG/ML
0.5 INJECTION INTRAVENOUS
Status: DISCONTINUED | OUTPATIENT
Start: 2023-06-16 | End: 2023-06-17 | Stop reason: HOSPADM

## 2023-06-16 RX ORDER — FLUMAZENIL 0.1 MG/ML
0.2 INJECTION, SOLUTION INTRAVENOUS
Status: DISCONTINUED | OUTPATIENT
Start: 2023-06-16 | End: 2023-06-17 | Stop reason: HOSPADM

## 2023-06-16 RX ORDER — FENTANYL CITRATE 50 UG/ML
INJECTION, SOLUTION INTRAMUSCULAR; INTRAVENOUS
Status: DISCONTINUED | OUTPATIENT
Start: 2023-06-16 | End: 2023-06-17 | Stop reason: HOSPADM

## 2023-06-16 RX ADMIN — CLOPIDOGREL BISULFATE 300 MG: 75 TABLET ORAL at 20:07

## 2023-06-16 RX ADMIN — Medication 3 G: at 13:53

## 2023-06-16 RX ADMIN — ASPIRIN 81 MG CHEWABLE TABLET 324 MG: 81 TABLET CHEWABLE at 10:08

## 2023-06-16 ASSESSMENT — ACTIVITIES OF DAILY LIVING (ADL)
ADLS_ACUITY_SCORE: 35

## 2023-06-16 ASSESSMENT — VISUAL ACUITY
OU: OTHER (SEE COMMENT)
OU: OTHER (SEE COMMENT)

## 2023-06-16 NOTE — DISCHARGE INSTRUCTIONS
Going Home after an PFO closure  After you go home:  Have an adult stay with you for 24 hours.  Drink plenty of fluids.  You may eat your normal diet, unless your doctor tells you otherwise.  For 24 hours:  Relax and take it easy.  Do NOT smoke.  Do NOT make any important or legal decisions.  Do NOT drive or operate machines at home or at work.  Do NOT drink alcohol.  Remove the Band-Aid after 24 hours. If there is minor oozing, apply another Band-aid and remove it after 12 hours.  For 2 days, do NOT have sex or do any heavy exercise.  Do NOT take a bath, or use a hot tub or pool for at least 3 days. You may shower.    Care of groin site  It is normal to have a small bruise or lump at the site.  Do not scrub the site.  For the first 2 days: Do not stoop or squat. When you cough, sneeze or move your bowels, hold your hand over the puncture site and press gently.  Do not lift more than 10 pounds for at least 3 to 5 days.  Do not use lotion or powder near the puncture site for 3 days.    If you start bleeding from the site in your groin, lie down flat and press firmly  on the site. Call your doctor as soon as you can.    Medicines  If you have started taking Plavix or Effient, do not stop taking it until you talk to your heart doctor (cardiologist).  If you are on metformin (Glucophage), do not restart it until you have blood tests (within 2 to 3 days after discharge). When your doctor tells you it is safe, you may restart the metformin.  If you have stopped any other medicines, check with your nurse or provider about when to restart them.    Call 911 right away if you have bleeding that is heavy or does not stop.    Call your doctor if:  You have a large or growing hard lump around the site.  The site is red, swollen, hot or tender.  Blood or fluid is draining from the site.  You have chills or a fever greater than 101 F (38 C).  Your leg or arm feels numb or cool.  You have hives, a rash or unusual  itching.    [facility] [place]:  [phone] (7 days a week)

## 2023-06-16 NOTE — PROGRESS NOTES
Arrived back to [facility] post PFO closure procedure in [facility]. AFVSS. Denies pain. Sinus rhythm. Activity restrictions reinforced & informed to call if experiencing any new symptoms. Bilat venous groin site C/D/I; no bleeding/hematoma noted. Tolerating water. Pt stable.

## 2023-06-16 NOTE — Clinical Note
Potential access sites were evaluated for patency using ultrasound.   The right femoral vein and left femoral vein were selected. Access was obtained under with Sonosite and Fluoroscopic guidance using a standard 18 guage needle with direct visualization of needle entry.

## 2023-06-16 NOTE — Clinical Note
Perclose utilized for closure of site.  Manual pressure applied by scrub person; hemostasis achieved.

## 2023-06-17 NOTE — PROGRESS NOTES
D/I/A:  Patient is tolerating liquids and foods, ambulating, urinating, puncture sites are stable ( no bleeding and no hematoma) and patient has a .  A+O x4 and making needs known.  CCL access sites C/D/I; no bleeding or hematoma; CMS intact.  VSSA.  SR on monitor.  IV access removed.  Education completed and outlined in AVS or handout: medications reviewed with patient. Pt loaded with plavix prior to leaving since he had actually stopped taking DOAC. Script sent to pt's home pharmacy.  Belongings returned to patient at discharge.    P: Discharged to self care.  Patient to follow up with appts as per discharge instruction.

## 2023-06-19 LAB
ATRIAL RATE - MUSE: 69 BPM
DIASTOLIC BLOOD PRESSURE - MUSE: NORMAL MMHG
INTERPRETATION ECG - MUSE: NORMAL
P AXIS - MUSE: 12 DEGREES
PR INTERVAL - MUSE: 186 MS
QRS DURATION - MUSE: 80 MS
QT - MUSE: 376 MS
QTC - MUSE: 402 MS
R AXIS - MUSE: 12 DEGREES
SYSTOLIC BLOOD PRESSURE - MUSE: NORMAL MMHG
T AXIS - MUSE: 8 DEGREES
VENTRICULAR RATE- MUSE: 69 BPM

## 2023-06-25 NOTE — PROGRESS NOTES
STRUCTURAL HEART CARE  CARDIOVASCULAR DIVISION  RETURN VISIT      HPI:     Horacio Panda 58 year old who presents for 1 week PFO closure follow-up. Patient has a history of HTN, HLD and acute CVA of the right posterior denise April 2023 at which time he was noted to have PFO by ESTELA. After discussion with stroke neurology empiric DOAC started given PFO and was referred to structural cardiology for consideration of PFO closure. He underwent successful PFO with a 30 mm GORE cardioform on 6/16/23. Post procedure ECHO showed normal LV function without pericardial effusion. He was discharged on aspirin and Plavix, anticoagulation was discontinued.     Has been feeling well from a cardiac standpoint - no chest pain, SOB, palpitations. Using a cane today. He is having arthritis flare of left ankle - has known large joint arthritis - have not been able to identify exact cause - not gout, rheum work-up negative. He is compliant with his ASA and Plavix, no bleeding concerns.      PAST MEDICAL HISTORY:     Past Medical History:   Diagnosis Date     Arthritis, hip     Lft Hip     Hyperuricemia     to 9.6     Stroke (H) 04/2023    cerebrocortex        PAST SURGICAL HISTORY:     Past Surgical History:   Procedure Laterality Date     CV PFO CLOSURE N/A 6/16/2023    Procedure: Heart Cath PFO Closure;  Surgeon: Gilbert Stone MD;  Location:  HEART CARDIAC CATH LAB     [facility] ARTHROPLASTY I-P JT  Age 26    Lft Hip        CURRENT MEDICATIONS:     Current Outpatient Medications   Medication     amLODIPine (NORVASC) 10 MG tablet     aspirin 81 MG EC tablet     atorvastatin (LIPITOR) 80 MG tablet     clopidogrel (PLAVIX) 75 MG tablet     losartan (COZAAR) 50 MG tablet     omeprazole (PRILOSEC) 20 MG DR capsule     No current facility-administered medications for this visit.          ALLERGIES:      No Known Allergies       FAMILY HISTORY:     Family History   Problem Relation Age of Onset     Hypertension Mother      Cancer Father       Hypertension Sister      Cancer Maternal Grandfather      Cerebrovascular Disease Paternal Grandmother      Heart Disease Paternal Grandfather         SOCIAL HISTORY:     Social History     Tobacco Use     Smoking status: Never     Smokeless tobacco: Never   Substance Use Topics     Alcohol use: Yes     Comment: occ        REVIEW OF SYSTEMS:     Constitutional: No fevers or chills  Skin: No new rash or itching  Eyes: No acute change in vision  Ears/Nose/Throat: No purulent rhinorrhea, new hearing loss, or new vertigo  Respiratory: No cough or hemoptysis  Cardiovascular: See HPI  Gastrointestinal: No change in appetite, vomiting, hematemesis or diarrhea  Genitourinary: No dysuria or hematuria  Musculoskeletal: No new back pain, neck pain or muscle pain  Neurologic: No new headaches, focal weakness or behavior changes  Psychiatric: No hallucinations, excessive alcohol consumption or illegal drug usage  Hematologic/Lymphatic/Immunologic: No bleeding, chills, fever, night sweats or weight loss  Endocrine: No new cold intolerance, heat intolerance, polyphagia, polydipsia or polyuria      PHYSICAL EXAMINATION:     There were no vitals taken for this visit.    GENERAL: No acute distress.  HEENT: EOMI. Sclerae white, not injected. Nares clear. Pharynx without erythema or exudate.   Neck: No adenopathy. No thyromegaly. No jugular venous distension.   Heart: Regular rate and rhythm. No murmur.   Lungs: Clear to auscultation. No ronchi, wheezes, rales.   Abdomen: Soft, nontender, nondistended. Bowel sounds present.  Extremities: No clubbing, cyanosis, or edema.   Neurologic: Alert and oriented to person/place/time, normal speech and affect. No focal deficits.  Skin: No petechiae, purpura or rash.     LABORATORY DATA:     Admission on 06/16/2023, Discharged on 06/16/2023   Component Date Value Ref Range Status     Sodium 06/16/2023 141  136 - 145 mmol/L Final     Potassium 06/16/2023 4.4  3.4 - 5.3 mmol/L Final     Chloride  06/16/2023 105  98 - 107 mmol/L Final     Carbon Dioxide (CO2) 06/16/2023 24  22 - 29 mmol/L Final     Anion Gap 06/16/2023 12  7 - 15 mmol/L Final     Urea Nitrogen 06/16/2023 14.1  6.0 - 20.0 mg/dL Final     Creatinine 06/16/2023 1.09  0.67 - 1.17 mg/dL Final     Calcium 06/16/2023 9.2  8.6 - 10.0 mg/dL Final     Glucose 06/16/2023 106 (H)  70 - 99 mg/dL Final     GFR Estimate 06/16/2023 79  >60 mL/min/1.73m2 Final    eGFR calculated using 2021 CKD-EPI equation.     WBC Count 06/16/2023 6.2  4.0 - 11.0 10e3/uL Final     RBC Count 06/16/2023 4.61  4.40 - 5.90 10e6/uL Final     Hemoglobin 06/16/2023 15.6  13.3 - 17.7 g/dL Final     Hematocrit 06/16/2023 44.4  40.0 - 53.0 % Final     MCV 06/16/2023 96  78 - 100 fL Final     MCH 06/16/2023 33.8 (H)  26.5 - 33.0 pg Final     MCHC 06/16/2023 35.1  31.5 - 36.5 g/dL Final     RDW 06/16/2023 11.5  10.0 - 15.0 % Final     Platelet Count 06/16/2023 263  150 - 450 10e3/uL Final     Ventricular Rate 06/16/2023 69  BPM Final     Atrial Rate 06/16/2023 69  BPM Final     MA Interval 06/16/2023 186  ms Final     QRS Duration 06/16/2023 80  ms Final     QT 06/16/2023 376  ms Final     QTc 06/16/2023 402  ms Final     P Axis 06/16/2023 12  degrees Final     R AXIS 06/16/2023 12  degrees Final     T [place] 06/16/2023 8  degrees Final     Interpretation ECG 06/16/2023    Final                    Value:Sinus rhythm  Normal ECG  When compared with ECG of 15-SMITA-2023 10:17, (unconfirmed)  No significant change was found  Confirmed by fellow Reyes Castro, Jorge ([id number]) on 6/19/2023 12:10:16 PM  Confirmed by MD BRAR JANE ([id number]) on 6/19/2023 2:03:28 PM       Activated Clotting Time (Celite) P* 06/16/2023 251 (H)  74 - 150 seconds Final     Activated Clotting Time (Celite) P* 06/16/2023 230 (H)  74 - 150 seconds Final     LVEF  06/16/2023 60-65%   Final        PROCEDURES & FURTHER ASSESSMENTS:     ECHO post procedure 6/16/23  Interpretation Summary  Global and regional left ventricular  function is normal with an EF of 60-65%.  Global right ventricular function is normal.  s/p PFO closure with 30mm Cardioform septal occluder device. Closure device  not well seen in these limited views.  There was no shunt at the atrial septal level as assessed by color Doppler.  No pericardial effusion is present.     ______________________________________________________________________________  Left Ventricle  Left ventricular size is normal. Global and regional left ventricular function  is normal with an EF of 60-65%.     Right Ventricle  The right ventricle is normal size. Global right ventricular function is  normal.     Atria  There was no shunt at the atrial septal level as assessed by color Doppler.  s/p PFO closure with 30mm Cardioform septal occluder device.     Vessels  The inferior vena cava was normal in size with preserved respiratory  variability.     Pericardium  No pericardial effusion is present.    ECG 6/16/23:  NSR no arrhytmiInterpretation Summary  Global and regional left ventricular function is normal with an EF of 60-65%.  Global right ventricular function is normal.  s/p PFO closure with 30mm Cardioform septal occluder device. Closure device  not well seen in these limited views.  There was no shunt at the atrial septal level as assessed by color Doppler.  No pericardial effusion is present.     ______________________________________________________________________________  Left Ventricle  Left ventricular size is normal. Global and regional left ventricular function  is normal with an EF of 60-65%.     Right Ventricle  The right ventricle is normal size. Global right ventricular function is  normal.     Atria  There was no shunt at the atrial septal level as assessed by color Doppler.  s/p PFO closure with 30mm Cardioform septal occluder device.     Vessels  The inferior vena cava was normal in size with preserved respiratory  variability.     Pericardium  No pericardial effusion is  present.as      CLINICAL IMPRESSION:   Horacio Panda 58 year old who presents for 1 week PFO closure follow-up. Patient has a history of HTN, HLD and acute CVA of the right posterior denise April 2023 at which time he was noted to have PFO by ESTELA. After discussion with stroke neurology empiric DOAC started given PFO and was referred to structural cardiology for consideration of PFO closure. He underwent successful PFO with a 30 mm GORE cardioform on 6/16/23. Post procedure ECHO showed normal LV function without pericardial effusion. He was discharged on aspirin and Plavix, anticoagulation was discontinued. Has been feeling well from a cardiac standpoint - no chest pain, SOB, palpitations, groin site is healing well and he is tolerating ASA and Plavix without bleeding concerns. Main concern today is acute left ankle pain, has a history of large joint arthritis, happens every couple of years and improves with short course of prednisone. Recommend he follow-up with sports medicine walk in clinic today for evaluation and management. Continue all other cardiac medications.      1. Continue Aspirin 81 mg daily indefinitely.  2. Plavix 75 mg daily x 6 months, okay to discontinue 11/16/23  3. Antibiotic prophylaxis x 6 months  4. Repeat echo 1 month, 6 months and 1 year.   4. Follow-up in 1 month with echo and labs prior to visit     KALINA Machado, CNP  [facility] Cardiology Team

## 2023-06-26 ENCOUNTER — OFFICE VISIT (OUTPATIENT)
Dept: CARDIOLOGY | Facility: CLINIC | Age: 58
End: 2023-06-26
Attending: NURSE PRACTITIONER
Payer: COMMERCIAL

## 2023-06-26 ENCOUNTER — ANCILLARY PROCEDURE (OUTPATIENT)
Dept: GENERAL RADIOLOGY | Facility: CLINIC | Age: 58
End: 2023-06-26
Attending: FAMILY MEDICINE
Payer: COMMERCIAL

## 2023-06-26 ENCOUNTER — OFFICE VISIT (OUTPATIENT)
Dept: ORTHOPEDICS | Facility: CLINIC | Age: 58
End: 2023-06-26
Payer: COMMERCIAL

## 2023-06-26 VITALS
WEIGHT: 275.7 LBS | DIASTOLIC BLOOD PRESSURE: 95 MMHG | SYSTOLIC BLOOD PRESSURE: 145 MMHG | OXYGEN SATURATION: 99 % | HEART RATE: 95 BPM | HEIGHT: 74 IN | BODY MASS INDEX: 35.38 KG/M2

## 2023-06-26 DIAGNOSIS — M25.572 LEFT ANKLE PAIN, UNSPECIFIED CHRONICITY: ICD-10-CM

## 2023-06-26 DIAGNOSIS — Z87.74 S/P PATENT FORAMEN OVALE CLOSURE: ICD-10-CM

## 2023-06-26 DIAGNOSIS — M10.9 ACUTE GOUT OF LEFT ANKLE, UNSPECIFIED CAUSE: Primary | ICD-10-CM

## 2023-06-26 DIAGNOSIS — M25.572 LEFT ANKLE PAIN, UNSPECIFIED CHRONICITY: Primary | ICD-10-CM

## 2023-06-26 DIAGNOSIS — Q21.12 PFO (PATENT FORAMEN OVALE): Primary | ICD-10-CM

## 2023-06-26 PROCEDURE — 99214 OFFICE O/P EST MOD 30 MIN: CPT | Performed by: NURSE PRACTITIONER

## 2023-06-26 PROCEDURE — G0463 HOSPITAL OUTPT CLINIC VISIT: HCPCS | Performed by: NURSE PRACTITIONER

## 2023-06-26 PROCEDURE — 99204 OFFICE O/P NEW MOD 45 MIN: CPT | Performed by: FAMILY MEDICINE

## 2023-06-26 PROCEDURE — 73610 X-RAY EXAM OF ANKLE: CPT | Mod: LT | Performed by: RADIOLOGY

## 2023-06-26 RX ORDER — OMEGA-3 FATTY ACIDS/FISH OIL 300-1000MG
200 CAPSULE ORAL EVERY 4 HOURS PRN
COMMUNITY
End: 2023-09-18

## 2023-06-26 RX ORDER — PREDNISONE 20 MG/1
TABLET ORAL
Qty: 10 TABLET | Refills: 0 | Status: SHIPPED | OUTPATIENT
Start: 2023-06-26 | End: 2023-09-18

## 2023-06-26 ASSESSMENT — PAIN SCALES - GENERAL: PAINLEVEL: NO PAIN (0)

## 2023-06-26 NOTE — LETTER
6/26/2023      RE: Horacio Panda  [address] [facility] [address]     Dear Colleague,    Thank you for referring your patient, Horacio Panda, to the [facility] [place]. Please see a copy of my visit note below.    Sports Medicine Clinic Visit    PCP: Олег Vizcarra    Horacio Panda is a 58 year old male who is seen  as self referral presenting with left ankle pain/swelling onset 9 days ago.    Patient indicates he was diagnosed at [facility] many years ago with gout.  He was placed on allopurinol at that time.  Since that time he has had intermittent episodes of joint swelling that have always responded quickly to a dose of prednisone.  He has not been on allopurinol or any other maintenance medicines for gout recently.    Injury: Chronic and intermittent swelling and pain     Location of Pain: left ankle  Duration of Pain:   Rating of Pain: 6/10  Pain is better with: Rest  Pain is worse with: Walking   Additional Features: Patient had heart surgery; intermittent swelling of joints.  History of hyperuricemia.  Treatment so far consists of: Icing, compression, and ibuprofen   Prior History of related problems: None     There were no vitals taken for this visit.    Patient is status post PFO closure 6/16/2023, in the setting of a acute CVA that occurred in April 2023.  Post procedure ECHO showed normal LV function without pericardial effusion. Xarelto anticoagulation discontinued one week prior to the PFO procedure. Cardiology note 6/26/2023 reviewed by me.    Medicines include ASA, Plavix, amlodipine, Cozaar, Prilosec          PMH:  Past Medical History:   Diagnosis Date    Arthritis, hip     Lft Hip    Hyperuricemia     to 9.6    Stroke (H) 04/2023    cerebrocortex       Active problem list:  Patient Active Problem List   Diagnosis    Status post hip replacement    High cholesterol    Essential hypertension with goal blood pressure less than 140/90    Hyperplastic  colon polyp    Gastroesophageal reflux disease without esophagitis    Obesity (BMI 35.0-39.9) with comorbidity (H)    Cerebrovascular accident (CVA), unspecified mechanism (H)    PFO (patent foramen ovale)       FH:  Family History   Problem Relation Age of Onset    Hypertension Mother     Cancer Father     Hypertension Sister     Cancer Maternal Grandfather     Cerebrovascular Disease Paternal Grandmother     Heart Disease Paternal Grandfather        SH:  Social History     Socioeconomic History    Marital status:      Spouse name: Not on file    Number of children: Not on file    Years of education: Not on file    Highest education level: Not on file   Occupational History    Not on file   Tobacco Use    Smoking status: Never    Smokeless tobacco: Never   Vaping Use    Vaping Use: Never used   Substance and Sexual Activity    Alcohol use: Yes     Comment: occ    Drug use: No    Sexual activity: Yes     Partners: Female     Birth control/protection: Surgical   Other Topics Concern    Parent/sibling w/ CABG, MI or angioplasty before 65F 55M? No   Social History Narrative    Not on file     Social Determinants of Health     Financial Resource Strain: Not on file   Food Insecurity: Not on file   Transportation Needs: Not on file   Physical Activity: Not on file   Stress: Not on file   Social Connections: Not on file   Intimate Partner Violence: Not on file   Housing Stability: Not on file       MEDS:  See EMR, reviewed  ALL:  See EMR, reviewed    REVIEW OF SYSTEMS:  CONSTITUTIONAL:NEGATIVE for fever, chills, change in weight  INTEGUMENTARY/SKIN: NEGATIVE for worrisome rashes, moles or lesions  EYES: NEGATIVE for vision changes or irritation  ENT/MOUTH: NEGATIVE for ear, mouth and throat problems  RESP:NEGATIVE for significant cough or SOB  BREAST: NEGATIVE for masses, tenderness or discharge  CV: NEGATIVE for chest pain, palpitations or peripheral edema  GI: NEGATIVE for nausea, abdominal pain, heartburn, or  change in bowel habits  :NEGATIVE for frequency, dysuria, or hematuria  :NEGATIVE for frequency, dysuria, or hematuria  NEURO: NEGATIVE for weakness, dizziness or paresthesias  ENDOCRINE: NEGATIVE for temperature intolerance, skin/hair changes  HEME/ALLERGY/IMMUNE: NEGATIVE for bleeding problems  PSYCHIATRIC: NEGATIVE for changes in mood or affect      Objective: He has an intra-articular effusion involving the left ankle, mildly to the tender to the touch.  No tenderness in the calf, no palpable cords in the calf, no swelling in the calf.  Homans' sign is negative.  Overlying skin is intact.  Nontender the Achilles tendon, peroneal or posterior tibial tendon.  No swelling in the midfoot.  Appropriate conversation and affect.    Assessment: Acute gout attack.    Plan: Prednisone 40 mg once daily for 5 days.  We did discuss the relative risk of bleeding in the setting of prednisone, Plavix, and aspirin.  Short cam walker boot provided.  Patient has a cane.  Patient agreed to schedule a follow-up visit with his primary provider in [place], for follow-up of gout, and consideration of preventative medicines for gout.        Again, thank you for allowing me to participate in the care of your patient.      Sincerely,    Trey Berkowitz MD

## 2023-06-26 NOTE — NURSING NOTE
Chief Complaint   Patient presents with     Follow Up     1-2wk follow up s/p PFO closure       Vitals were taken, medications reconciled.    Christiana Oquendo, EMT   1:55 PM  
English

## 2023-06-26 NOTE — LETTER
6/26/2023      RE: Horacio Panda  [address] [facility] [address]       Dear Colleague,    Thank you for the opportunity to participate in the care of your patient, Horacio Panda, at the [facility]. Please see a copy of my visit note below.        STRUCTURAL HEART CARE  CARDIOVASCULAR DIVISION  RETURN VISIT      HPI:     Horacio Panda 58 year old who presents for 1 week PFO closure follow-up. Patient has a history of HTN, HLD and acute CVA of the right posterior denise April 2023 at which time he was noted to have PFO by [facility]. After discussion with stroke neurology empiric DOAC started given PFO and was referred to structural cardiology for consideration of PFO closure. He underwent successful PFO with a 30 mm GORE cardioform on 6/16/23. Post procedure ECHO showed normal LV function without pericardial effusion. He was discharged on aspirin and Plavix, anticoagulation was discontinued.     Has been feeling well from a cardiac standpoint - no chest pain, SOB, palpitations. Using a cane today. He is having arthritis flare of left ankle - has known large joint arthritis - have not been able to identify exact cause - not gout, rheum work-up negative. He is compliant with his ASA and Plavix, no bleeding concerns.      PAST MEDICAL HISTORY:     Past Medical History:   Diagnosis Date    Arthritis, hip     Lft Hip    Hyperuricemia     to 9.6    Stroke (H) 04/2023    cerebrocortex        PAST SURGICAL HISTORY:     Past Surgical History:   Procedure Laterality Date    CV PFO CLOSURE N/A 6/16/2023    Procedure: Heart Cath PFO Closure;  Surgeon: Gilbert Stone MD;  Location:  HEART CARDIAC CATH LAB    ZC ARTHROPLASTY I-P JT  Age 26    Lft Hip        CURRENT MEDICATIONS:     Current Outpatient Medications   Medication    amLODIPine (NORVASC) 10 MG tablet    aspirin 81 MG EC tablet    atorvastatin (LIPITOR) 80 MG tablet     clopidogrel (PLAVIX) 75 MG tablet    losartan (COZAAR) 50 MG tablet    omeprazole (PRILOSEC) 20 MG DR capsule     No current facility-administered medications for this visit.          ALLERGIES:      No Known Allergies       FAMILY HISTORY:     Family History   Problem Relation Age of Onset    Hypertension Mother     Cancer Father     Hypertension Sister     Cancer Maternal Grandfather     Cerebrovascular Disease Paternal Grandmother     Heart Disease Paternal Grandfather         SOCIAL HISTORY:     Social History     Tobacco Use    Smoking status: Never    Smokeless tobacco: Never   Substance Use Topics    Alcohol use: Yes     Comment: occ        REVIEW OF SYSTEMS:     Constitutional: No fevers or chills  Skin: No new rash or itching  Eyes: No acute change in vision  Ears/Nose/Throat: No purulent rhinorrhea, new hearing loss, or new vertigo  Respiratory: No cough or hemoptysis  Cardiovascular: See HPI  Gastrointestinal: No change in appetite, vomiting, hematemesis or diarrhea  Genitourinary: No dysuria or hematuria  Musculoskeletal: No new back pain, neck pain or muscle pain  Neurologic: No new headaches, focal weakness or behavior changes  Psychiatric: No hallucinations, excessive alcohol consumption or illegal drug usage  Hematologic/Lymphatic/Immunologic: No bleeding, chills, fever, night sweats or weight loss  Endocrine: No new cold intolerance, heat intolerance, polyphagia, polydipsia or polyuria      PHYSICAL EXAMINATION:     There were no vitals taken for this visit.    GENERAL: No acute distress.  HEENT: EOMI. Sclerae white, not injected. Nares clear. Pharynx without erythema or exudate.   Neck: No adenopathy. No thyromegaly. No jugular venous distension.   Heart: Regular rate and rhythm. No murmur.   Lungs: Clear to auscultation. No ronchi, wheezes, rales.   Abdomen: Soft, nontender, nondistended. Bowel sounds present.  Extremities: No clubbing, cyanosis, or edema.   Neurologic: Alert and oriented to  person/place/time, normal speech and affect. No focal deficits.  Skin: No petechiae, purpura or rash.     LABORATORY DATA:     Admission on 06/16/2023, Discharged on 06/16/2023   Component Date Value Ref Range Status    Sodium 06/16/2023 141  136 - 145 mmol/L Final    Potassium 06/16/2023 4.4  3.4 - 5.3 mmol/L Final    Chloride 06/16/2023 105  98 - 107 mmol/L Final    Carbon Dioxide (CO2) 06/16/2023 24  22 - 29 mmol/L Final    Anion Gap 06/16/2023 12  7 - 15 mmol/L Final    Urea Nitrogen 06/16/2023 14.1  6.0 - 20.0 mg/dL Final    Creatinine 06/16/2023 1.09  0.67 - 1.17 mg/dL Final    Calcium 06/16/2023 9.2  8.6 - 10.0 mg/dL Final    Glucose 06/16/2023 106 (H)  70 - 99 mg/dL Final    GFR Estimate 06/16/2023 79  >60 mL/min/1.73m2 Final    eGFR calculated using 2021 CKD-EPI equation.    WBC Count 06/16/2023 6.2  4.0 - 11.0 10e3/uL Final    RBC Count 06/16/2023 4.61  4.40 - 5.90 10e6/uL Final    Hemoglobin 06/16/2023 15.6  13.3 - 17.7 g/dL Final    Hematocrit 06/16/2023 44.4  40.0 - 53.0 % Final    MCV 06/16/2023 96  78 - 100 fL Final    MCH 06/16/2023 33.8 (H)  26.5 - 33.0 pg Final    MCHC 06/16/2023 35.1  31.5 - 36.5 g/dL Final    RDW 06/16/2023 11.5  10.0 - 15.0 % Final    Platelet Count 06/16/2023 263  150 - 450 10e3/uL Final    Ventricular Rate 06/16/2023 69  BPM Final    Atrial Rate 06/16/2023 69  BPM Final    MN Interval 06/16/2023 186  ms Final    QRS Duration 06/16/2023 80  ms Final    QT 06/16/2023 376  ms Final    QTc 06/16/2023 402  ms Final    P Axis 06/16/2023 12  degrees Final    R AXIS 06/16/2023 12  degrees Final    T [place] 06/16/2023 8  degrees Final    Interpretation ECG 06/16/2023    Final                    Value:Sinus rhythm  Normal ECG  When compared with ECG of 15-SMITA-2023 10:17, (unconfirmed)  No significant change was found  Confirmed by fellow Reyes Castro, Jorge ([id number]) on 6/19/2023 12:10:16 PM  Confirmed by MD BRAR JANE ([id number]) on 6/19/2023 2:03:28 PM      Activated Clotting Time (Celite)  P* 06/16/2023 251 (H)  74 - 150 seconds Final    Activated Clotting Time (Celite) P* 06/16/2023 230 (H)  74 - 150 seconds Final    LVEF  06/16/2023 60-65%   Final        PROCEDURES & FURTHER ASSESSMENTS:     ECHO post procedure 6/16/23  Interpretation Summary  Global and regional left ventricular function is normal with an EF of 60-65%.  Global right ventricular function is normal.  s/p PFO closure with 30mm Cardioform septal occluder device. Closure device  not well seen in these limited views.  There was no shunt at the atrial septal level as assessed by color Doppler.  No pericardial effusion is present.     ______________________________________________________________________________  Left Ventricle  Left ventricular size is normal. Global and regional left ventricular function  is normal with an EF of 60-65%.     Right Ventricle  The right ventricle is normal size. Global right ventricular function is  normal.     Atria  There was no shunt at the atrial septal level as assessed by color Doppler.  s/p PFO closure with 30mm Cardioform septal occluder device.     Vessels  The inferior vena cava was normal in size with preserved respiratory  variability.     Pericardium  No pericardial effusion is present.    ECG 6/16/23:  NSR no arrhytmiInterpretation Summary  Global and regional left ventricular function is normal with an EF of 60-65%.  Global right ventricular function is normal.  s/p PFO closure with 30mm Cardioform septal occluder device. Closure device  not well seen in these limited views.  There was no shunt at the atrial septal level as assessed by color Doppler.  No pericardial effusion is present.     ______________________________________________________________________________  Left Ventricle  Left ventricular size is normal. Global and regional left ventricular function  is normal with an EF of 60-65%.     Right Ventricle  The right ventricle is normal size. Global right ventricular function  is  normal.     Atria  There was no shunt at the atrial septal level as assessed by color Doppler.  s/p PFO closure with 30mm Cardioform septal occluder device.     Vessels  The inferior vena cava was normal in size with preserved respiratory  variability.     Pericardium  No pericardial effusion is present.as      CLINICAL IMPRESSION:   Horacio Panda 58 year old who presents for 1 week PFO closure follow-up. Patient has a history of HTN, HLD and acute CVA of the right posterior denise April 2023 at which time he was noted to have PFO by [facility]. After discussion with stroke neurology empiric DOAC started given PFO and was referred to structural cardiology for consideration of PFO closure. He underwent successful PFO with a 30 mm GORE cardioform on 6/16/23. Post procedure ECHO showed normal LV function without pericardial effusion. He was discharged on aspirin and Plavix, anticoagulation was discontinued. Has been feeling well from a cardiac standpoint - no chest pain, SOB, palpitations, groin site is healing well and he is tolerating ASA and Plavix without bleeding concerns. Main concern today is acute left ankle pain, has a history of large joint arthritis, happens every couple of years and improves with short course of prednisone. Recommend he follow-up with sports medicine walk in clinic today for evaluation and management. Continue all other cardiac medications.      1. Continue Aspirin 81 mg daily indefinitely.  2. Plavix 75 mg daily x 6 months, okay to discontinue 11/16/23  3. Antibiotic prophylaxis x 6 months  4. Repeat echo 1 month, 6 months and 1 year.   4. Follow-up in 1 month with echo and labs prior to visit     KALINA Machado, CNP  [facility] Cardiology Team

## 2023-06-26 NOTE — PROGRESS NOTES
Sports Medicine Clinic Visit    PCP: Олег Vizcarra    Horacio Panda is a 58 year old male who is seen  as self referral presenting with left ankle pain/swelling onset 9 days ago.    Patient indicates he was diagnosed at [facility] many years ago with gout.  He was placed on allopurinol at that time.  Since that time he has had intermittent episodes of joint swelling that have always responded quickly to a dose of prednisone.  He has not been on allopurinol or any other maintenance medicines for gout recently.    Injury: Chronic and intermittent swelling and pain     Location of Pain: left ankle  Duration of Pain:   Rating of Pain: 6/10  Pain is better with: Rest  Pain is worse with: Walking   Additional Features: Patient had heart surgery; intermittent swelling of joints.  History of hyperuricemia.  Treatment so far consists of: Icing, compression, and ibuprofen   Prior History of related problems: None     There were no vitals taken for this visit.    Patient is status post PFO closure 6/16/2023, in the setting of a acute CVA that occurred in April 2023.  Post procedure ECHO showed normal LV function without pericardial effusion. Xarelto anticoagulation discontinued one week prior to the PFO procedure. Cardiology note 6/26/2023 reviewed by me.    Medicines include ASA, Plavix, amlodipine, Cozaar, Prilosec          PMH:  Past Medical History:   Diagnosis Date     Arthritis, hip     Lft Hip     Hyperuricemia     to 9.6     Stroke (H) 04/2023    cerebrocortex       Active problem list:  Patient Active Problem List   Diagnosis     Status post hip replacement     High cholesterol     Essential hypertension with goal blood pressure less than 140/90     Hyperplastic colon polyp     Gastroesophageal reflux disease without esophagitis     Obesity (BMI 35.0-39.9) with comorbidity (H)     Cerebrovascular accident (CVA), unspecified mechanism (H)     PFO (patent foramen ovale)       FH:  Family History   Problem  Relation Age of Onset     Hypertension Mother      Cancer Father      Hypertension Sister      Cancer Maternal Grandfather      Cerebrovascular Disease Paternal Grandmother      Heart Disease Paternal Grandfather        SH:  Social History     Socioeconomic History     Marital status:      Spouse name: Not on file     Number of children: Not on file     Years of education: Not on file     Highest education level: Not on file   Occupational History     Not on file   Tobacco Use     Smoking status: Never     Smokeless tobacco: Never   Vaping Use     Vaping Use: Never used   Substance and Sexual Activity     Alcohol use: Yes     Comment: occ     Drug use: No     Sexual activity: Yes     Partners: Female     Birth control/protection: Surgical   Other Topics Concern     Parent/sibling w/ CABG, MI or angioplasty before 65F 55M? No   Social History Narrative     Not on file     Social Determinants of Health     Financial Resource Strain: Not on file   Food Insecurity: Not on file   Transportation Needs: Not on file   Physical Activity: Not on file   Stress: Not on file   Social Connections: Not on file   Intimate Partner Violence: Not on file   Housing Stability: Not on file       MEDS:  See EMR, reviewed  ALL:  See EMR, reviewed    REVIEW OF SYSTEMS:  CONSTITUTIONAL:NEGATIVE for fever, chills, change in weight  INTEGUMENTARY/SKIN: NEGATIVE for worrisome rashes, moles or lesions  EYES: NEGATIVE for vision changes or irritation  ENT/MOUTH: NEGATIVE for ear, mouth and throat problems  RESP:NEGATIVE for significant cough or SOB  BREAST: NEGATIVE for masses, tenderness or discharge  CV: NEGATIVE for chest pain, palpitations or peripheral edema  GI: NEGATIVE for nausea, abdominal pain, heartburn, or change in bowel habits  :NEGATIVE for frequency, dysuria, or hematuria  :NEGATIVE for frequency, dysuria, or hematuria  NEURO: NEGATIVE for weakness, dizziness or paresthesias  ENDOCRINE: NEGATIVE for temperature  intolerance, skin/hair changes  HEME/ALLERGY/IMMUNE: NEGATIVE for bleeding problems  PSYCHIATRIC: NEGATIVE for changes in mood or affect      Objective: He has an intra-articular effusion involving the left ankle, mildly to the tender to the touch.  No tenderness in the calf, no palpable cords in the calf, no swelling in the calf.  Homans' sign is negative.  Overlying skin is intact.  Nontender the Achilles tendon, peroneal or posterior tibial tendon.  No swelling in the midfoot.  Appropriate conversation and affect.    Assessment: Acute gout attack.    Plan: Prednisone 40 mg once daily for 5 days.  We did discuss the relative risk of bleeding in the setting of prednisone, Plavix, and aspirin.  Short cam walker boot provided.  Patient has a cane.  Patient agreed to schedule a follow-up visit with his primary provider in [place], for follow-up of gout, and consideration of preventative medicines for gout.

## 2023-06-26 NOTE — PATIENT INSTRUCTIONS
You were seen today in the Structural Heart Clinic at the [facility].    Cardiology provider you saw during your visit: Frances Pantoja NP    Instructions:   Continue aspirin 81 mg daily lifelong.  Continue Plavix 75 mg x 6 months   Delay any nonurgent dental procedures for the first 6 month post PFO closure - if you need an urgent dental procedure please call for antibiotics  Please see ortho urgent care for ankle swelling and prednisone prescription.   Follow-up in Valve Clinic in 1 month with echo, labs and ECG prior     Questions and scheduling:   First call: Structural Heart  Lizbeth Rosales [phone]    General scheduling line: [phone].   First press #1 for the University and then press #3 for Medical Questions to reach the Cardiology triage nurse.     On Call Cardiologist for after hours or on weekends: [phone], press option #4 and ask to speak to the on-call Cardiologist.

## 2023-07-27 NOTE — PROGRESS NOTES
STRUCTURAL HEART CARE  CARDIOVASCULAR DIVISION  RETURN VISIT      HPI:     Horacio Panda 58 year old who presents for 1 month PFO closure follow-up. Patient has a history of HTN, HLD and acute CVA of the right posterior denise April 2023 at which time he was noted to have PFO by ESTELA. After discussion with stroke neurology empiric DOAC started given PFO and referred to structural cardiology for consideration of PFO closure. He underwent successful PFO with a 30 mm GORE cardioform on 6/16/23. Post procedure ECHO showed normal LV function without pericardial effusion. He was discharged on aspirin and Plavix, anticoagulation was discontinued.     Last evaluated 1 week post closure, reported feeling well from a cardiac standpoint but was having an arthritis flare of the left ankle, so wasn't particularly active. He was later seen by ortho and treated with a short course of oral steroids with improvement in pain.     Since his last visit he has been feeling alright. He is walking 30-40 minutes a day with no significant chest pain or shortness of breath. He is having fairly frequent episodes of fluttering in his chest. Episodes can last minutes to hours. When the episodes come on, he feels very tired. He checked his vitals during one of the episodes last week and his BP was low 98/74 and HR 80. He was a little dizzy during this episode as well.     No neurological symptoms. Tolerating Aspirin and Plavix without bleeding concerns.      PAST MEDICAL HISTORY:   Arthritis  CVA 5/2023  PFO s/p PFO closure 6/2023  HTN  HLD     PAST SURGICAL HISTORY:     Past Surgical History:   Procedure Laterality Date     CV PFO CLOSURE N/A 6/16/2023    Procedure: Heart Cath PFO Closure;  Surgeon: Gilbert Stone MD;  Location:  HEART CARDIAC CATH LAB     [facility] ARTHROPLASTY I-P JT  Age 26    Lft Hip      CURRENT MEDICATIONS:     Current Outpatient Medications   Medication     amLODIPine (NORVASC) 10 MG tablet     aspirin 81 MG EC tablet  "    atorvastatin (LIPITOR) 80 MG tablet     clopidogrel (PLAVIX) 75 MG tablet     ibuprofen (ADVIL/MOTRIN) 200 MG capsule     losartan (COZAAR) 50 MG tablet     omeprazole (PRILOSEC) 20 MG DR capsule     predniSONE (DELTASONE) 20 MG tablet     No current facility-administered medications for this visit.          ALLERGIES:      No Known Allergies       FAMILY HISTORY:     Family History   Problem Relation Age of Onset     Hypertension Mother      Cancer Father      Hypertension Sister      Cancer Maternal Grandfather      Cerebrovascular Disease Paternal Grandmother      Heart Disease Paternal Grandfather         SOCIAL HISTORY:     Social History     Tobacco Use     Smoking status: Never     Smokeless tobacco: Never   Substance Use Topics     Alcohol use: Yes     Comment: occ        REVIEW OF SYSTEMS:     Constitutional: No fevers or chills  Skin: No new rash or itching  Eyes: No acute change in vision  Ears/Nose/Throat: No purulent rhinorrhea, new hearing loss, or new vertigo  Respiratory: No cough or hemoptysis  Cardiovascular: See HPI  Gastrointestinal: No change in appetite, vomiting, hematemesis or diarrhea  Genitourinary: No dysuria or hematuria  Musculoskeletal: No new back pain, neck pain or muscle pain  Neurologic: No new headaches, focal weakness or behavior changes  Psychiatric: No hallucinations, excessive alcohol consumption or illegal drug usage  Hematologic/Lymphatic/Immunologic: No bleeding, chills, fever, night sweats or weight loss  Endocrine: No new cold intolerance, heat intolerance, polyphagia, polydipsia or polyuria      PHYSICAL EXAMINATION:     BP (!) 144/94 (BP Location: Right arm, Patient Position: Sitting, Cuff Size: Adult Regular)   Pulse 67   Ht 1.88 m (6' 2.02\")   Wt 122.4 kg (269 lb 12.8 oz)   SpO2 97%   BMI 34.63 kg/m      GENERAL: No acute distress.  HEENT: EOMI. Sclerae white, not injected. Nares clear. Pharynx without erythema or exudate.   Neck: No adenopathy. No " thyromegaly. No jugular venous distension.   Heart: Regular rate and rhythm. No murmur.   Lungs: Clear to auscultation. No ronchi, wheezes, rales.   Abdomen: Soft, nontender, nondistended. Bowel sounds present.  Extremities: No clubbing, cyanosis, or edema.   Neurologic: Alert and oriented to person/place/time, normal speech and affect. No focal deficits.  Skin: No petechiae, purpura or rash.     LABORATORY DATA:     Admission on 06/16/2023, Discharged on 06/16/2023   Component Date Value Ref Range Status     Sodium 06/16/2023 141  136 - 145 mmol/L Final     Potassium 06/16/2023 4.4  3.4 - 5.3 mmol/L Final     Chloride 06/16/2023 105  98 - 107 mmol/L Final     Carbon Dioxide (CO2) 06/16/2023 24  22 - 29 mmol/L Final     Anion Gap 06/16/2023 12  7 - 15 mmol/L Final     Urea Nitrogen 06/16/2023 14.1  6.0 - 20.0 mg/dL Final     Creatinine 06/16/2023 1.09  0.67 - 1.17 mg/dL Final     Calcium 06/16/2023 9.2  8.6 - 10.0 mg/dL Final     Glucose 06/16/2023 106 (H)  70 - 99 mg/dL Final     GFR Estimate 06/16/2023 79  >60 mL/min/1.73m2 Final    eGFR calculated using 2021 CKD-EPI equation.     WBC Count 06/16/2023 6.2  4.0 - 11.0 10e3/uL Final     RBC Count 06/16/2023 4.61  4.40 - 5.90 10e6/uL Final     Hemoglobin 06/16/2023 15.6  13.3 - 17.7 g/dL Final     Hematocrit 06/16/2023 44.4  40.0 - 53.0 % Final     MCV 06/16/2023 96  78 - 100 fL Final     MCH 06/16/2023 33.8 (H)  26.5 - 33.0 pg Final     MCHC 06/16/2023 35.1  31.5 - 36.5 g/dL Final     RDW 06/16/2023 11.5  10.0 - 15.0 % Final     Platelet Count 06/16/2023 263  150 - 450 10e3/uL Final     Ventricular Rate 06/16/2023 69  BPM Final     Atrial Rate 06/16/2023 69  BPM Final     IN Interval 06/16/2023 186  ms Final     QRS Duration 06/16/2023 80  ms Final     QT 06/16/2023 376  ms Final     QTc 06/16/2023 402  ms Final     P Axis 06/16/2023 12  degrees Final     R AXIS 06/16/2023 12  degrees Final     T [place] 06/16/2023 8  degrees Final     Interpretation ECG 06/16/2023     Final                    Value:Sinus rhythm  Normal ECG  When compared with ECG of 15-SMITA-2023 10:17, (unconfirmed)  No significant change was found  Confirmed by fellow Reyes Castro, Jorge ([id number]) on 6/19/2023 12:10:16 PM  Confirmed by MD BRAR JANE ([id number]) on 6/19/2023 2:03:28 PM       Activated Clotting Time (Celite) P* 06/16/2023 251 (H)  74 - 150 seconds Final     Activated Clotting Time (Celite) P* 06/16/2023 230 (H)  74 - 150 seconds Final     LVEF  06/16/2023 60-65%   Final        PROCEDURES & FURTHER ASSESSMENTS:     EKG 7/28/23  NSR HR 76 no arrhythmias     ECHO 7/28/23:  Interpretation Summary  Global and regional left ventricular function is normal with an EF of 55-60%.  Global right ventricular function is normal.  No significant valvular abnormalities present.  Post PFO closure with no shunt evident on color Doppler.  Small inferior vena cava size consistent with hypovolemia.  No pericardial effusion is present.  ______________________________________________________________________________  Left Ventricle  Left ventricular size is normal. Left ventricular wall thickness is normal.  Global and regional left ventricular function is normal with an EF of 55-60%.     Right Ventricle  The right ventricle is normal size. Global right ventricular function is  normal.     Atria  Both atria appear normal. s/p PFO closure with 30mm Cardioform septal occluder  device. Closure device not well seen in these limited views.     Mitral Valve  The mitral valve is normal.     Aortic Valve  Aortic valve is normal in structure and function.     Tricuspid Valve  The tricuspid valve is normal. Trace tricuspid insufficiency is present. The  right ventricular systolic pressure is approximated at 15.9 mmHg plus the  right atrial pressure. Pulmonary artery systolic pressure is normal.     Pulmonic Valve  The pulmonic valve is normal.     Vessels  The aorta root is normal. Small inferior vena cava size consistent  with  hypovolemia.     Pericardium  No pericardial effusion is present.     Miscellaneous  No significant valvular abnormalities present.     Compared to Previous Study  This study was compared with the study from 6.2023 . No significant changes  noted.    ECHO post procedure 6/16/23  Interpretation Summary  Global and regional left ventricular function is normal with an EF of 60-65%.  Global right ventricular function is normal.  s/p PFO closure with 30mm Cardioform septal occluder device. Closure device  not well seen in these limited views.  There was no shunt at the atrial septal level as assessed by color Doppler.  No pericardial effusion is present.     ______________________________________________________________________________  Left Ventricle  Left ventricular size is normal. Global and regional left ventricular function  is normal with an EF of 60-65%.     Right Ventricle  The right ventricle is normal size. Global right ventricular function is  normal.     Atria  There was no shunt at the atrial septal level as assessed by color Doppler.  s/p PFO closure with 30mm Cardioform septal occluder device.     Vessels  The inferior vena cava was normal in size with preserved respiratory  variability.     Pericardium  No pericardial effusion is present.     CLINICAL IMPRESSION:   Horacio Panda 58 year old who presents for 1 month PFO closure follow-up. Patient has a history of HTN, HLD and acute CVA of the right posterior denise April 2023 at which time he was noted to have PFO by ESTELA. After discussion with stroke neurology empiric DOAC started given PFO and referred to structural cardiology for consideration of PFO closure. He underwent successful PFO with a 30 mm GORE cardioform on 6/16/23. Post procedure ECHO showed normal LV function without pericardial effusion. He was discharged on aspirin and Plavix, anticoagulation was discontinued.     He is active without chest pain or shortness of breath. He does report  frequent episodes of heart fluttering with associated fatigue, concerning for possible arrhythmia. ECHO shows well seated closure without residual shunt by color doppler but is notable for small IVC consistent with hypovolemia which could also be the cause of his tachycardia/fatigue. Labs otherwise stable today.      1. Continue Aspirin 81 mg daily indefinitely.  2. Plavix 75 mg daily x 6 months, okay to discontinue 11/16/23  3. Antibiotic prophylaxis x 6 months  4. Cardionet x 2 weeks to evaluate for atrial fibrillation  5. Increase water intake in the event of dehydration  6. RTC 6 months with echo and labs prior         4. Follow-up in 1 month with echo and labs prior to visit     KALINA Machado, CNP  [facility] Cardiology Team

## 2023-07-28 ENCOUNTER — OFFICE VISIT (OUTPATIENT)
Dept: CARDIOLOGY | Facility: CLINIC | Age: 58
End: 2023-07-28
Attending: NURSE PRACTITIONER
Payer: COMMERCIAL

## 2023-07-28 ENCOUNTER — LAB (OUTPATIENT)
Dept: LAB | Facility: CLINIC | Age: 58
End: 2023-07-28
Attending: NURSE PRACTITIONER
Payer: COMMERCIAL

## 2023-07-28 VITALS
HEIGHT: 74 IN | BODY MASS INDEX: 34.63 KG/M2 | WEIGHT: 269.8 LBS | DIASTOLIC BLOOD PRESSURE: 94 MMHG | HEART RATE: 67 BPM | OXYGEN SATURATION: 97 % | SYSTOLIC BLOOD PRESSURE: 144 MMHG

## 2023-07-28 DIAGNOSIS — Z87.74 S/P PATENT FORAMEN OVALE CLOSURE: Primary | ICD-10-CM

## 2023-07-28 DIAGNOSIS — Z87.74 S/P PATENT FORAMEN OVALE CLOSURE: ICD-10-CM

## 2023-07-28 DIAGNOSIS — R00.2 PALPITATIONS: ICD-10-CM

## 2023-07-28 DIAGNOSIS — I63.9 CEREBROVASCULAR ACCIDENT (CVA), UNSPECIFIED MECHANISM (H): ICD-10-CM

## 2023-07-28 DIAGNOSIS — Q21.12 PFO (PATENT FORAMEN OVALE): ICD-10-CM

## 2023-07-28 LAB
ANION GAP SERPL CALCULATED.3IONS-SCNC: 10 MMOL/L (ref 7–15)
BUN SERPL-MCNC: 15.7 MG/DL (ref 6–20)
CALCIUM SERPL-MCNC: 9.4 MG/DL (ref 8.6–10)
CHLORIDE SERPL-SCNC: 102 MMOL/L (ref 98–107)
CREAT SERPL-MCNC: 1.22 MG/DL (ref 0.67–1.17)
DEPRECATED HCO3 PLAS-SCNC: 25 MMOL/L (ref 22–29)
ERYTHROCYTE [DISTWIDTH] IN BLOOD BY AUTOMATED COUNT: 11.9 % (ref 10–15)
GFR SERPL CREATININE-BSD FRML MDRD: 69 ML/MIN/1.73M2
GLUCOSE SERPL-MCNC: 112 MG/DL (ref 70–99)
HCT VFR BLD AUTO: 45.5 % (ref 40–53)
HGB BLD-MCNC: 15.6 G/DL (ref 13.3–17.7)
LVEF ECHO: NORMAL
MCH RBC QN AUTO: 32.8 PG (ref 26.5–33)
MCHC RBC AUTO-ENTMCNC: 34.3 G/DL (ref 31.5–36.5)
MCV RBC AUTO: 96 FL (ref 78–100)
PLATELET # BLD AUTO: 248 10E3/UL (ref 150–450)
POTASSIUM SERPL-SCNC: 4 MMOL/L (ref 3.4–5.3)
RBC # BLD AUTO: 4.76 10E6/UL (ref 4.4–5.9)
SODIUM SERPL-SCNC: 137 MMOL/L (ref 136–145)
WBC # BLD AUTO: 7.5 10E3/UL (ref 4–11)

## 2023-07-28 PROCEDURE — 85027 COMPLETE CBC AUTOMATED: CPT | Performed by: PATHOLOGY

## 2023-07-28 PROCEDURE — 99214 OFFICE O/P EST MOD 30 MIN: CPT | Mod: 25 | Performed by: NURSE PRACTITIONER

## 2023-07-28 PROCEDURE — 36415 COLL VENOUS BLD VENIPUNCTURE: CPT | Performed by: PATHOLOGY

## 2023-07-28 PROCEDURE — 93005 ELECTROCARDIOGRAM TRACING: CPT

## 2023-07-28 PROCEDURE — 80048 BASIC METABOLIC PNL TOTAL CA: CPT | Performed by: PATHOLOGY

## 2023-07-28 PROCEDURE — 93306 TTE W/DOPPLER COMPLETE: CPT | Performed by: INTERNAL MEDICINE

## 2023-07-28 PROCEDURE — G0463 HOSPITAL OUTPT CLINIC VISIT: HCPCS | Mod: 25 | Performed by: NURSE PRACTITIONER

## 2023-07-28 ASSESSMENT — PAIN SCALES - GENERAL: PAINLEVEL: NO PAIN (0)

## 2023-07-28 NOTE — NURSING NOTE
Chief Complaint   Patient presents with    Follow Up     s/p PFO closure 30 day follow up       Vitals were taken, medications reconciled.    Christiana Oquendo, EMT   10:15 AM   Yes

## 2023-07-28 NOTE — PATIENT INSTRUCTIONS
You were seen today in the Structural Heart Clinic at the [facility].    Cardiology provider you saw during your visit: Frances Pantoja NP    Your ECHO shows well seated device and labs are stable. Wear cardiac monitor to evaluate for a-fib.     Instructions:   Continue aspirin 81 mg daily lifelong.  Continue Plavix 75 mg x 6 months   Delay any nonurgent dental procedures for the first 6 month post PFO closure - if you need an urgent dental procedure please call for antibiotics  Wear two week cardiac monitor to evaluate for atrial fibrillation  Increase water intake due to suspected dehydration.   Follow-up in Valve Clinic in 6 months with echo prior     Questions and scheduling:   First call: Structural Heart  Lizbeth Rosales [phone]    General scheduling line: [phone].   First press #1 for the University and then press #3 for Medical Questions to reach the Cardiology triage nurse.     On Call Cardiologist for after hours or on weekends: [phone], press option #4 and ask to speak to the on-call Cardiologist.

## 2023-07-28 NOTE — LETTER
7/28/2023      RE: Horacio Panda  [id number] [address] [id number]       Dear Colleague,    Thank you for the opportunity to participate in the care of your patient, Horacio Panda, at the [facility] [place] [facility]. Please see a copy of my visit note below.        [facility] HEART CARE  CARDIOVASCULAR DIVISION  RETURN VISIT      HPI:     Horacio Panda 58 year old who presents for 1 month PFO closure follow-up. Patient has a history of HTN, HLD and acute CVA of the right posterior denise April 2023 at which time he was noted to have PFO by ESTELA. After discussion with stroke neurology empiric DOAC started given PFO and referred to structural cardiology for consideration of PFO closure. He underwent successful PFO with a 30 mm GORE cardioform on 6/16/23. Post procedure ECHO showed normal LV function without pericardial effusion. He was discharged on aspirin and Plavix, anticoagulation was discontinued.     Last evaluated 1 week post closure, reported feeling well from a cardiac standpoint but was having an arthritis flare of the left ankle, so wasn't particularly active. He was later seen by ortho and treated with a short course of oral steroids with improvement in pain.     Since his last visit he has been feeling alright. He is walking 30-40 minutes a day with no significant chest pain or shortness of breath. He is having fairly frequent episodes of fluttering in his chest. Episodes can last minutes to hours. When the episodes come on, he feels very tired. He checked his vitals during one of the episodes last week and his BP was low 98/74 and HR 80. He was a little dizzy during this episode as well.     No neurological symptoms. Tolerating Aspirin and Plavix without bleeding concerns.      PAST MEDICAL HISTORY:   Arthritis  CVA 5/2023  PFO s/p PFO closure 6/2023  HTN  HLD     PAST SURGICAL HISTORY:     Past Surgical History:   Procedure  Laterality Date    CV PFO CLOSURE N/A 6/16/2023    Procedure: Heart Cath PFO Closure;  Surgeon: Gilbert Stone MD;  Location:  HEART CARDIAC CATH LAB    [facility] ARTHROPLASTY I-P JT  Age 26    Lft Hip      CURRENT MEDICATIONS:     Current Outpatient Medications   Medication    amLODIPine (NORVASC) 10 MG tablet    aspirin 81 MG EC tablet    atorvastatin (LIPITOR) 80 MG tablet    clopidogrel (PLAVIX) 75 MG tablet    ibuprofen (ADVIL/MOTRIN) 200 MG capsule    losartan (COZAAR) 50 MG tablet    omeprazole (PRILOSEC) 20 MG DR capsule    predniSONE (DELTASONE) 20 MG tablet     No current facility-administered medications for this visit.          ALLERGIES:      No Known Allergies       FAMILY HISTORY:     Family History   Problem Relation Age of Onset    Hypertension Mother     Cancer Father     Hypertension Sister     Cancer Maternal Grandfather     Cerebrovascular Disease Paternal Grandmother     Heart Disease Paternal Grandfather         SOCIAL HISTORY:     Social History     Tobacco Use    Smoking status: Never    Smokeless tobacco: Never   Substance Use Topics    Alcohol use: Yes     Comment: occ        REVIEW OF SYSTEMS:     Constitutional: No fevers or chills  Skin: No new rash or itching  Eyes: No acute change in vision  Ears/Nose/Throat: No purulent rhinorrhea, new hearing loss, or new vertigo  Respiratory: No cough or hemoptysis  Cardiovascular: See HPI  Gastrointestinal: No change in appetite, vomiting, hematemesis or diarrhea  Genitourinary: No dysuria or hematuria  Musculoskeletal: No new back pain, neck pain or muscle pain  Neurologic: No new headaches, focal weakness or behavior changes  Psychiatric: No hallucinations, excessive alcohol consumption or illegal drug usage  Hematologic/Lymphatic/Immunologic: No bleeding, chills, fever, night sweats or weight loss  Endocrine: No new cold intolerance, heat intolerance, polyphagia, polydipsia or polyuria      PHYSICAL EXAMINATION:     BP (!) 144/94 (BP Location:  "Right arm, Patient Position: Sitting, Cuff Size: Adult Regular)   Pulse 67   Ht 1.88 m (6' 2.02\")   Wt 122.4 kg (269 lb 12.8 oz)   SpO2 97%   BMI 34.63 kg/m      GENERAL: No acute distress.  HEENT: EOMI. Sclerae white, not injected. Nares clear. Pharynx without erythema or exudate.   Neck: No adenopathy. No thyromegaly. No jugular venous distension.   Heart: Regular rate and rhythm. No murmur.   Lungs: Clear to auscultation. No ronchi, wheezes, rales.   Abdomen: Soft, nontender, nondistended. Bowel sounds present.  Extremities: No clubbing, cyanosis, or edema.   Neurologic: Alert and oriented to person/place/time, normal speech and affect. No focal deficits.  Skin: No petechiae, purpura or rash.     LABORATORY DATA:     Admission on 06/16/2023, Discharged on 06/16/2023   Component Date Value Ref Range Status    Sodium 06/16/2023 141  136 - 145 mmol/L Final    Potassium 06/16/2023 4.4  3.4 - 5.3 mmol/L Final    Chloride 06/16/2023 105  98 - 107 mmol/L Final    Carbon Dioxide (CO2) 06/16/2023 24  22 - 29 mmol/L Final    Anion Gap 06/16/2023 12  7 - 15 mmol/L Final    Urea Nitrogen 06/16/2023 14.1  6.0 - 20.0 mg/dL Final    Creatinine 06/16/2023 1.09  0.67 - 1.17 mg/dL Final    Calcium 06/16/2023 9.2  8.6 - 10.0 mg/dL Final    Glucose 06/16/2023 106 (H)  70 - 99 mg/dL Final    GFR Estimate 06/16/2023 79  >60 mL/min/1.73m2 Final    eGFR calculated using 2021 CKD-EPI equation.    WBC Count 06/16/2023 6.2  4.0 - 11.0 10e3/uL Final    RBC Count 06/16/2023 4.61  4.40 - 5.90 10e6/uL Final    Hemoglobin 06/16/2023 15.6  13.3 - 17.7 g/dL Final    Hematocrit 06/16/2023 44.4  40.0 - 53.0 % Final    MCV 06/16/2023 96  78 - 100 fL Final    MCH 06/16/2023 33.8 (H)  26.5 - 33.0 pg Final    MCHC 06/16/2023 35.1  31.5 - 36.5 g/dL Final    RDW 06/16/2023 11.5  10.0 - 15.0 % Final    Platelet Count 06/16/2023 263  150 - 450 10e3/uL Final    Ventricular Rate 06/16/2023 69  BPM Final    Atrial Rate 06/16/2023 69  BPM Final    CO " Interval 06/16/2023 186  ms Final    QRS Duration 06/16/2023 80  ms Final    QT 06/16/2023 376  ms Final    QTc 06/16/2023 402  ms Final    P Axis 06/16/2023 12  degrees Final    R AXIS 06/16/2023 12  degrees Final    T [place] 06/16/2023 8  degrees Final    Interpretation ECG 06/16/2023    Final                    Value:Sinus rhythm  Normal ECG  When compared with ECG of 15-SMITA-2023 10:17, (unconfirmed)  No significant change was found  Confirmed by fellow Reyes Castro, Jorge ([id number]) on 6/19/2023 12:10:16 PM  Confirmed by MD BRAR JANE ([id number]) on 6/19/2023 2:03:28 PM      Activated Clotting Time (Celite) P* 06/16/2023 251 (H)  74 - 150 seconds Final    Activated Clotting Time (Celite) P* 06/16/2023 230 (H)  74 - 150 seconds Final    LVEF  06/16/2023 60-65%   Final        PROCEDURES & FURTHER ASSESSMENTS:     EKG 7/28/23  NSR HR 76 no arrhythmias     ECHO 7/28/23:  Interpretation Summary  Global and regional left ventricular function is normal with an EF of 55-60%.  Global right ventricular function is normal.  No significant valvular abnormalities present.  Post PFO closure with no shunt evident on color Doppler.  Small inferior vena cava size consistent with hypovolemia.  No pericardial effusion is present.  ______________________________________________________________________________  Left Ventricle  Left ventricular size is normal. Left ventricular wall thickness is normal.  Global and regional left ventricular function is normal with an EF of 55-60%.     Right Ventricle  The right ventricle is normal size. Global right ventricular function is  normal.     Atria  Both atria appear normal. s/p PFO closure with 30mm Cardioform septal occluder  device. Closure device not well seen in these limited views.     Mitral Valve  The mitral valve is normal.     Aortic Valve  Aortic valve is normal in structure and function.     Tricuspid Valve  The tricuspid valve is normal. Trace tricuspid insufficiency is present.  The  right ventricular systolic pressure is approximated at 15.9 mmHg plus the  right atrial pressure. Pulmonary artery systolic pressure is normal.     Pulmonic Valve  The pulmonic valve is normal.     Vessels  The aorta root is normal. Small inferior vena cava size consistent with  hypovolemia.     Pericardium  No pericardial effusion is present.     Miscellaneous  No significant valvular abnormalities present.     Compared to Previous Study  This study was compared with the study from 6.2023 . No significant changes  noted.    ECHO post procedure 6/16/23  Interpretation Summary  Global and regional left ventricular function is normal with an EF of 60-65%.  Global right ventricular function is normal.  s/p PFO closure with 30mm Cardioform septal occluder device. Closure device  not well seen in these limited views.  There was no shunt at the atrial septal level as assessed by color Doppler.  No pericardial effusion is present.     ______________________________________________________________________________  Left Ventricle  Left ventricular size is normal. Global and regional left ventricular function  is normal with an EF of 60-65%.     Right Ventricle  The right ventricle is normal size. Global right ventricular function is  normal.     Atria  There was no shunt at the atrial septal level as assessed by color Doppler.  s/p PFO closure with 30mm Cardioform septal occluder device.     Vessels  The inferior vena cava was normal in size with preserved respiratory  variability.     Pericardium  No pericardial effusion is present.     CLINICAL IMPRESSION:   Horacio Panda 58 year old who presents for 1 month PFO closure follow-up. Patient has a history of HTN, HLD and acute CVA of the right posterior denise April 2023 at which time he was noted to have PFO by ESTELA. After discussion with stroke neurology empiric DOAC started given PFO and referred to structural cardiology for consideration of PFO closure. He underwent  successful PFO with a 30 mm GORE cardioform on 6/16/23. Post procedure ECHO showed normal LV function without pericardial effusion. He was discharged on aspirin and Plavix, anticoagulation was discontinued.     He is active without chest pain or shortness of breath. He does report frequent episodes of heart fluttering with associated fatigue, concerning for possible arrhythmia. ECHO shows well seated closure without residual shunt by color doppler but is notable for small IVC consistent with hypovolemia which could also be the cause of his tachycardia/fatigue. Labs otherwise stable today.      1. Continue Aspirin 81 mg daily indefinitely.  2. Plavix 75 mg daily x 6 months, okay to discontinue 11/16/23  3. Antibiotic prophylaxis x 6 months  4. Cardionet x 2 weeks to evaluate for atrial fibrillation  5. Increase water intake in the event of dehydration  6. RTC 6 months with echo and labs prior         4. Follow-up in 1 month with echo and labs prior to visit     KALINA Machado, CNP  [facility] Cardiology Team

## 2023-08-01 LAB
ATRIAL RATE - MUSE: 76 BPM
DIASTOLIC BLOOD PRESSURE - MUSE: NORMAL MMHG
INTERPRETATION ECG - MUSE: NORMAL
P AXIS - MUSE: 26 DEGREES
PR INTERVAL - MUSE: 160 MS
QRS DURATION - MUSE: 94 MS
QT - MUSE: 392 MS
QTC - MUSE: 441 MS
R AXIS - MUSE: 19 DEGREES
SYSTOLIC BLOOD PRESSURE - MUSE: NORMAL MMHG
T AXIS - MUSE: 1 DEGREES
VENTRICULAR RATE- MUSE: 76 BPM

## 2023-08-07 ENCOUNTER — CARE COORDINATION (OUTPATIENT)
Dept: CARDIOLOGY | Facility: CLINIC | Age: 58
End: 2023-08-07
Payer: COMMERCIAL

## 2023-08-07 ENCOUNTER — ANTICOAGULATION THERAPY VISIT (OUTPATIENT)
Dept: ANTICOAGULATION | Facility: CLINIC | Age: 58
End: 2023-08-07
Payer: COMMERCIAL

## 2023-08-07 DIAGNOSIS — I48.91 ATRIAL FIBRILLATION (H): Primary | ICD-10-CM

## 2023-08-07 RX ORDER — METOPROLOL TARTRATE 25 MG/1
25 TABLET, FILM COATED ORAL 2 TIMES DAILY
Qty: 180 TABLET | Refills: 3 | Status: SHIPPED | OUTPATIENT
Start: 2023-08-07 | End: 2024-06-17

## 2023-08-07 RX ORDER — WARFARIN SODIUM 5 MG/1
5 TABLET ORAL DAILY
Qty: 90 TABLET | Refills: 3 | Status: SHIPPED | OUTPATIENT
Start: 2023-08-07 | End: 2023-08-11 | Stop reason: ALTCHOICE

## 2023-08-07 NOTE — PROGRESS NOTES
Date: 8/7/2023    Time of Call: 4:57 PM     Diagnosis:  atrial fibrillation     [ TORB ] Ordering provider: Frances Pantoja NP  Order:   Metoprolol 25 mg, by mouth, two times daily  Warfarin 5 mg, by mouth, follow anticoagulation's directions for dose adjustments  Anticoagulation clinic referral for [place].    --Continue warfarin, ASA 81 mg, and plavix 75 mg.  When the INR reaches 2.0 stop the ASA 81 mg and continue the plavix.      Order received by: Lucila Joseph RN     Follow-up/additional notes:   Received instructions from Frances Pantoja that the patient is in new onset afib.  Pt was called with this information.  He stated that he was surprised because he had been feeling great the last few days.      Initially, Frances had said the patient should begin eliquis.  Horacio requested warfarin due to the cost. He stated his dad is on warfarin so he is familiar with the INR checks and does not mind this.  Frances was fyi'd about this and was OK with warfarin.  Horacio is scheduled with Tamara Koo on Wednesday for new onset afib.

## 2023-08-07 NOTE — PROGRESS NOTES
ANTICOAGULATION  MANAGEMENT: NEW REFERRAL      SUBJECTIVE/OBJECTIVE     Horacio Panda, a 58 year old male  is newly referred to [facility].    Anticoagulation:    Previously on warfarin: No, new to anticoagulation  Warfarin initiation date (approximate): 8/8/23   Indication(s): Atrial Fibrillation   Goal Range: 2.0-3.0   Anticoagulation Bridge/Overlap: No   Referring provider: from [facility] provider      ASSESSMENT     Goal INR 2-3, standard for indication(s) above  Starting warfarin dose is appropriate for patient's anticipated sensitivity to warfarin    PLAN     Dosing Instructions: Continue your current warfarin dose with INR in 3 days       Summary  As of 8/7/2023      Next INR check:                 Education provided:   Taking warfarin: purpose of warfarin and how it works and take warfarin at same time each day; preferably in the evening  Goal range and lab monitoring: goal range and significance of current result, Importance of therapeutic range, Importance of following up at instructed interval, and frequency of lab work when starting warfarin and importance of following up when instructed (extends after stability established)    Education still needed:   Dietary considerations: importance of consistent vitamin K intake, impact of vitamin K foods on INR, and vitamin K content of foods  Healthy lifestyle considerations: potential interaction between warfarin and alcohol and impact of exercise/activity level on INR  Symptom monitoring: monitoring for bleeding signs and symptoms, monitoring for clotting signs and symptoms, and when to seek medical attention/emergency care  Importance of notifying anticoagulation clinic for: changes in medications; a sooner lab recheck maybe needed, diarrhea, nausea/vomiting, reduced intake, cold/flu, and/or infections; a sooner lab recheck maybe needed, upcoming surgeries and procedures 2 weeks in advance, and if you did not receive dosing  instructions on the same day as your labs were checked  Contact [phone]  with any changes, questions or concerns.       Telephone call with Horacio who verbalizes understanding and agrees to plan and who agrees to plan and repeated back plan correctly    Lab visit scheduled    Standing orders placed in Epic: Point of Care INR (Lab 5000)    Plan made per [facility] anticoagulation protocol    Bro Smith RN  Anticoagulation Clinic  8/7/2023

## 2023-08-09 ENCOUNTER — TELEPHONE (OUTPATIENT)
Dept: CARDIOLOGY | Facility: CLINIC | Age: 58
End: 2023-08-09
Payer: COMMERCIAL

## 2023-08-09 ENCOUNTER — OFFICE VISIT (OUTPATIENT)
Dept: CARDIOLOGY | Facility: CLINIC | Age: 58
End: 2023-08-09
Attending: NURSE PRACTITIONER
Payer: COMMERCIAL

## 2023-08-09 VITALS
HEART RATE: 58 BPM | SYSTOLIC BLOOD PRESSURE: 125 MMHG | HEIGHT: 74 IN | WEIGHT: 275.9 LBS | BODY MASS INDEX: 35.41 KG/M2 | DIASTOLIC BLOOD PRESSURE: 84 MMHG | OXYGEN SATURATION: 97 %

## 2023-08-09 DIAGNOSIS — I48.0 PAROXYSMAL ATRIAL FIBRILLATION (H): Primary | ICD-10-CM

## 2023-08-09 PROCEDURE — G0463 HOSPITAL OUTPT CLINIC VISIT: HCPCS | Performed by: NURSE PRACTITIONER

## 2023-08-09 PROCEDURE — 93010 ELECTROCARDIOGRAM REPORT: CPT | Performed by: INTERNAL MEDICINE

## 2023-08-09 PROCEDURE — 93005 ELECTROCARDIOGRAM TRACING: CPT

## 2023-08-09 PROCEDURE — 99215 OFFICE O/P EST HI 40 MIN: CPT | Performed by: NURSE PRACTITIONER

## 2023-08-09 ASSESSMENT — PAIN SCALES - GENERAL: PAINLEVEL: NO PAIN (0)

## 2023-08-09 NOTE — LETTER
8/9/2023      RE: Horacio Panda  [id number] [facility] [id number]       Dear Colleague,    Thank you for the opportunity to participate in the care of your patient, Horacio Panda, at the [facility] [place] at [facility]. Please see a copy of my visit note below.        ELECTROPHYSIOLOGY CLINIC VISIT    Assessment/Recommendations   Assessment/Plan:    Mr. Panda is a 58 year old male who has a medical history significant for PFO s/p percutaneous closure 6/2023, CVA 4/2023, HTN, HLD, and arthritis.     Paroxsymal Atrial Tachycardia:   1. Stroke Prophylaxis:  CHADSVASC=++CVA, +HTN  3, corresponding to a 3.2% annual stroke / systemic emolism event rate.  He has been recently started on Warfarin, tolerating well thus far. Followed by Coumadin Clinic. Discussed that the MCOT is showing runs of AT and not fully blown AF at this time. However, given recent CVA agree with anticoagulation for now. Can consider chronicity at further follow ups depending course if AT appears to be more post opeartive then could consider stopping. Would consider ILR for monitoring in this case.   2. Rate Control: Continue Metoprolol.   3. Rhythm Control: Cardioversion, Antiarrhythmics and/or ablation are options for rhythm control. Monitoring had a day with some AT. He is not having a high burden or symptoms at this time. Will defer further rhythm control measures for now. Will extend MCOT to wear for 1 month for occult AF monitoring.  Discussed that the AT and his prior symptoms could all be provoked after percutaneous PFO closure. However, given CVA need to also consider this risk factor.  There is suspicion for cardio-embolic causation. Studies have consistently shown higher yield for occult AF with implantable monitoring. Therefore, we agree to consider ILR implant if needed for further monitoring.   4. Risk Factor Management: Statin, BP control, and CARLENE evaluation as  indicated.        Follow up in 2-3 months.        History of Present Illness/Subjective    Mr. Horacio Panda is a 58 year old male who comes in today for EP consultation of concern for PAF.    Mr. Panda is a 58 year old male who has a medical history significant for PFO s/p percutaneous closure 6/2023, CVA 4/2023, HTN, HLD, and arthritis.     He suffered an acute CVA of the right posterior denise in 4/2023 and work up showed a PFO by ESTELA. After discussion with stroke neurology empiric DOAC started given PFO and he was referred to structural cardiology for consideration of PFO closure. He underwent successful PFO with a 30 mm GORE cardioform on 6/16/23. Post procedure ECHO showed normal LV function without pericardial effusion. He was discharged on aspirin and Plavix, anticoagulation was discontinued.  He is having fairly frequent episodes of fluttering in his chest. Episodes can last minutes to hours. When the episodes come on, he feels very tired. He checked his vitals during one of the episodes last week and his BP was low 98/74 and HR 80. He was a little dizzy during this episode as well.  He was placed on MCOT monitor and there was concern for PAF, he was started on Warfarin, and he was referred to EP for evaluation.     He reports feeling well. He states he has not had any further palpitations even during time MCOT  He denies chest discomfort, palpitations, abdominal fullness/bloating or peripheral edema, shortness of breath, paroxysmal nocturnal dyspnea, orthopnea, lightheadedness, dizziness, pre-syncope, or syncope. He has only been on MCTO for a couple weeks. Thus far review of tracings show runs of AT. Presenting 12 lead ECG shows SB Vent Rate 57 bpm,  ms, QRS 90 ms, QTc 410 ms. Current cardiac medications include: Metoprolol, Losartan, Norvasc, Lipitor, ASA, and Warfarin.       I have reviewed and updated the patient's Past Medical History, Social History, Family History and Medication List.  "    Cardiographics (Personally Reviewed) :   7/28/23 Echo:  Interpretation Summary  Global and regional left ventricular function is normal with an EF of 55-60%.  Global right ventricular function is normal.  No significant valvular abnormalities present.  Post PFO closure with no shunt evident on color Doppler.  Small inferior vena cava size consistent with hypovolemia.  No pericardial effusion is present.    6/16/23 Echo:   Interpretation Summary  Global and regional left ventricular function is normal with an EF of 60-65%.  Global right ventricular function is normal.  s/p PFO closure with 30mm Cardioform septal occluder device. Closure device  not well seen in these limited views.  There was no shunt at the atrial septal level as assessed by color Doppler.  No pericardial effusion is present.       Physical Examination   /84 (BP Location: Left arm, Patient Position: Chair, Cuff Size: Adult Regular)   Pulse 58   Ht 1.875 m (6' 1.82\")   Wt 125.1 kg (275 lb 14.4 oz)   SpO2 97%   BMI 35.60 kg/m    Wt Readings from Last 3 Encounters:   07/28/23 122.4 kg (269 lb 12.8 oz)   06/26/23 125.1 kg (275 lb 11.2 oz)   06/16/23 123.8 kg (273 lb)     General Appearance:   Alert, well-appearing and in no acute distress.   HEENT: Atraumatic, normocephalic. PERRL.  MMM.   Chest/Lungs:   Respirations unlabored.  Lungs are clear to auscultation.   Cardiovascular:   Regular rate and rhythm.  S1/S2. No murmur.    Abdomen:  Soft, nontender, nondistended.   Extremities: No cyanosis or clubbing. No edema.    Musculoskeletal: Moves all extremities.  Gait normal.   Skin: Warm, dry, intact.    Neurologic: Mood and affect are appropriate.  Alert and oriented to person, place, time, and situation.          Medications  Allergies   Current Outpatient Medications   Medication Sig Dispense Refill    amLODIPine (NORVASC) 10 MG tablet Take 1 tablet (10 mg) by mouth daily 90 tablet 1    aspirin 81 MG EC tablet Take 1 tablet (81 mg) by " mouth daily 90 tablet 3    atorvastatin (LIPITOR) 80 MG tablet Take 1 tablet (80 mg) by mouth daily 90 tablet 1    clopidogrel (PLAVIX) 75 MG tablet Take 1 tablet (75 mg) by mouth daily 90 tablet 3    ibuprofen (ADVIL/MOTRIN) 200 MG capsule Take 200 mg by mouth every 4 hours as needed for fever      losartan (COZAAR) 50 MG tablet Take 1 tablet (50 mg) by mouth daily 90 tablet 1    metoprolol tartrate (LOPRESSOR) 25 MG tablet Take 1 tablet (25 mg) by mouth 2 times daily 180 tablet 3    omeprazole (PRILOSEC) 20 MG DR capsule TAKE 1 CAPSULE BY MOUTH DAILY FOR 90 DAYS. TAKE 1 HOUR BEFORE A MEAL. 90 capsule 3    predniSONE (DELTASONE) 20 MG tablet 40 mg every morning for five days 10 tablet 0    warfarin ANTICOAGULANT (COUMADIN ANTICOAGULANT) 5 MG tablet Take 1 tablet (5 mg) by mouth daily 90 tablet 3    No Known Allergies      Lab Results (Personally Reviewed)    Chemistry/lipid CBC Cardiac Enzymes/BNP/TSH/INR   Lab Results   Component Value Date    BUN 15.7 07/28/2023     07/28/2023    CO2 25 07/28/2023     Creatinine   Date Value Ref Range Status   07/28/2023 1.22 (H) 0.67 - 1.17 mg/dL Final   03/27/2018 1.11 0.66 - 1.25 mg/dL Final       Lab Results   Component Value Date    CHOL 173 04/05/2022    HDL 49 04/05/2022     (H) 04/05/2022      Lab Results   Component Value Date    WBC 7.5 07/28/2023    HGB 15.6 07/28/2023    HCT 45.5 07/28/2023    MCV 96 07/28/2023     07/28/2023    Lab Results   Component Value Date    INR 1.01 04/10/2023        The patient states understanding and is agreeable with the plan.   Tamara Koo, KALINA CNP  Electrophysiology Consult Service  Pager: Text Page

## 2023-08-09 NOTE — TELEPHONE ENCOUNTER
Unable to run test claim, [facility] pharmacy processed Rx already today 8/9/2023.     I did call [facility], per tech she stated it will be no copay for patient.     Eliquis 5mg BID    -$0 copay

## 2023-08-09 NOTE — PROGRESS NOTES
ELECTROPHYSIOLOGY CLINIC VISIT    Assessment/Recommendations   Assessment/Plan:    Mr. Panda is a 58 year old male who has a medical history significant for PFO s/p percutaneous closure 6/2023, CVA 4/2023, HTN, HLD, and arthritis.     Paroxsymal Atrial Tachycardia:   1. Stroke Prophylaxis:  CHADSVASC=++CVA, +HTN  3, corresponding to a 3.2% annual stroke / systemic emolism event rate.  He has been recently started on Warfarin, tolerating well thus far. Followed by Coumadin Clinic. Discussed that the MCOT is showing runs of AT and not fully blown AF at this time. However, given recent CVA agree with anticoagulation for now. Can consider chronicity at further follow ups depending course if AT appears to be more post opeartive then could consider stopping. Would consider ILR for monitoring in this case.   2. Rate Control: Continue Metoprolol.   3. Rhythm Control: Cardioversion, Antiarrhythmics and/or ablation are options for rhythm control. Monitoring had a day with some AT. He is not having a high burden or symptoms at this time. Will defer further rhythm control measures for now. Will extend MCOT to wear for 1 month for occult AF monitoring.  Discussed that the AT and his prior symptoms could all be provoked after percutaneous PFO closure. However, given CVA need to also consider this risk factor.  There is suspicion for cardio-embolic causation. Studies have consistently shown higher yield for occult AF with implantable monitoring. Therefore, we agree to consider ILR implant if needed for further monitoring.   4. Risk Factor Management: Statin, BP control, and CARLENE evaluation as indicated.        Follow up in 2-3 months.        History of Present Illness/Subjective    Mr. Horacio Panda is a 58 year old male who comes in today for EP consultation of concern for PAF.    Mr. Panda is a 58 year old male who has a medical history significant for PFO s/p percutaneous closure 6/2023, CVA 4/2023, HTN, HLD, and arthritis.      He suffered an acute CVA of the right posterior denise in 4/2023 and work up showed a PFO by ESTELA. After discussion with stroke neurology empiric DOAC started given PFO and he was referred to structural cardiology for consideration of PFO closure. He underwent successful PFO with a 30 mm GORE cardioform on 6/16/23. Post procedure ECHO showed normal LV function without pericardial effusion. He was discharged on aspirin and Plavix, anticoagulation was discontinued.  He is having fairly frequent episodes of fluttering in his chest. Episodes can last minutes to hours. When the episodes come on, he feels very tired. He checked his vitals during one of the episodes last week and his BP was low 98/74 and HR 80. He was a little dizzy during this episode as well.  He was placed on MCOT monitor and there was concern for PAF, he was started on Warfarin, and he was referred to EP for evaluation.     He reports feeling well. He states he has not had any further palpitations even during time MCOT  He denies chest discomfort, palpitations, abdominal fullness/bloating or peripheral edema, shortness of breath, paroxysmal nocturnal dyspnea, orthopnea, lightheadedness, dizziness, pre-syncope, or syncope. He has only been on MCTO for a couple weeks. Thus far review of tracings show runs of AT. Presenting 12 lead ECG shows SB Vent Rate 57 bpm,  ms, QRS 90 ms, QTc 410 ms. Current cardiac medications include: Metoprolol, Losartan, Norvasc, Lipitor, ASA, and Warfarin.       I have reviewed and updated the patient's Past Medical History, Social History, Family History and Medication List.     Cardiographics (Personally Reviewed) :   7/28/23 Echo:  Interpretation Summary  Global and regional left ventricular function is normal with an EF of 55-60%.  Global right ventricular function is normal.  No significant valvular abnormalities present.  Post PFO closure with no shunt evident on color Doppler.  Small inferior vena cava size  "consistent with hypovolemia.  No pericardial effusion is present.    6/16/23 Echo:   Interpretation Summary  Global and regional left ventricular function is normal with an EF of 60-65%.  Global right ventricular function is normal.  s/p PFO closure with 30mm Cardioform septal occluder device. Closure device  not well seen in these limited views.  There was no shunt at the atrial septal level as assessed by color Doppler.  No pericardial effusion is present.       Physical Examination   /84 (BP Location: Left arm, Patient Position: Chair, Cuff Size: Adult Regular)   Pulse 58   Ht 1.875 m (6' 1.82\")   Wt 125.1 kg (275 lb 14.4 oz)   SpO2 97%   BMI 35.60 kg/m    Wt Readings from Last 3 Encounters:   07/28/23 122.4 kg (269 lb 12.8 oz)   06/26/23 125.1 kg (275 lb 11.2 oz)   06/16/23 123.8 kg (273 lb)     General Appearance:   Alert, well-appearing and in no acute distress.   HEENT: Atraumatic, normocephalic. PERRL.  MMM.   Chest/Lungs:   Respirations unlabored.  Lungs are clear to auscultation.   Cardiovascular:   Regular rate and rhythm.  S1/S2. No murmur.    Abdomen:  Soft, nontender, nondistended.   Extremities: No cyanosis or clubbing. No edema.    Musculoskeletal: Moves all extremities.  Gait normal.   Skin: Warm, dry, intact.    Neurologic: Mood and affect are appropriate.  Alert and oriented to person, place, time, and situation.          Medications  Allergies   Current Outpatient Medications   Medication Sig Dispense Refill    amLODIPine (NORVASC) 10 MG tablet Take 1 tablet (10 mg) by mouth daily 90 tablet 1    aspirin 81 MG EC tablet Take 1 tablet (81 mg) by mouth daily 90 tablet 3    atorvastatin (LIPITOR) 80 MG tablet Take 1 tablet (80 mg) by mouth daily 90 tablet 1    clopidogrel (PLAVIX) 75 MG tablet Take 1 tablet (75 mg) by mouth daily 90 tablet 3    ibuprofen (ADVIL/MOTRIN) 200 MG capsule Take 200 mg by mouth every 4 hours as needed for fever      losartan (COZAAR) 50 MG tablet Take 1 tablet " (50 mg) by mouth daily 90 tablet 1    metoprolol tartrate (LOPRESSOR) 25 MG tablet Take 1 tablet (25 mg) by mouth 2 times daily 180 tablet 3    omeprazole (PRILOSEC) 20 MG DR capsule TAKE 1 CAPSULE BY MOUTH DAILY FOR 90 DAYS. TAKE 1 HOUR BEFORE A MEAL. 90 capsule 3    predniSONE (DELTASONE) 20 MG tablet 40 mg every morning for five days 10 tablet 0    warfarin ANTICOAGULANT (COUMADIN ANTICOAGULANT) 5 MG tablet Take 1 tablet (5 mg) by mouth daily 90 tablet 3    No Known Allergies      Lab Results (Personally Reviewed)    Chemistry/lipid CBC Cardiac Enzymes/BNP/TSH/INR   Lab Results   Component Value Date    BUN 15.7 07/28/2023     07/28/2023    CO2 25 07/28/2023     Creatinine   Date Value Ref Range Status   07/28/2023 1.22 (H) 0.67 - 1.17 mg/dL Final   03/27/2018 1.11 0.66 - 1.25 mg/dL Final       Lab Results   Component Value Date    CHOL 173 04/05/2022    HDL 49 04/05/2022     (H) 04/05/2022      Lab Results   Component Value Date    WBC 7.5 07/28/2023    HGB 15.6 07/28/2023    HCT 45.5 07/28/2023    MCV 96 07/28/2023     07/28/2023    Lab Results   Component Value Date    INR 1.01 04/10/2023        The patient states understanding and is agreeable with the plan.   KALINA Smith CNP  Electrophysiology Consult Service  Pager: Text Page

## 2023-08-09 NOTE — NURSING NOTE
Chief Complaint   Patient presents with    New Patient     New onset AF - review biotel: printed.         Vitals were taken, medications reconciled.    Prateek Platt, Facilitator   11:30 AM  Vitals were performed. Medications reconciled. EKG was performed.   Prateek Platt - Visit Facilitator

## 2023-08-11 ENCOUNTER — MYC MEDICAL ADVICE (OUTPATIENT)
Dept: CARDIOLOGY | Facility: CLINIC | Age: 58
End: 2023-08-11
Payer: COMMERCIAL

## 2023-08-11 ENCOUNTER — TELEPHONE (OUTPATIENT)
Dept: ANTICOAGULATION | Facility: CLINIC | Age: 58
End: 2023-08-11
Payer: COMMERCIAL

## 2023-08-11 DIAGNOSIS — I48.91 ATRIAL FIBRILLATION (H): Primary | ICD-10-CM

## 2023-08-11 LAB
ATRIAL RATE - MUSE: 57 BPM
DIASTOLIC BLOOD PRESSURE - MUSE: NORMAL MMHG
INTERPRETATION ECG - MUSE: NORMAL
P AXIS - MUSE: 4 DEGREES
PR INTERVAL - MUSE: 166 MS
QRS DURATION - MUSE: 90 MS
QT - MUSE: 422 MS
QTC - MUSE: 410 MS
R AXIS - MUSE: 6 DEGREES
SYSTOLIC BLOOD PRESSURE - MUSE: NORMAL MMHG
T AXIS - MUSE: 9 DEGREES
VENTRICULAR RATE- MUSE: 57 BPM

## 2023-08-11 NOTE — TELEPHONE ENCOUNTER
ANTICOAGULATION  MANAGEMENT    Horacio Panda is being discharged from the [facility]ticoagulation [facility] ([facility]).    Reason for discharge: warfarin replaced by alternate therapy, Eliquis    Anticoagulation episode resolved, [facility] referral closed, and Standing order discontinued    If patient needs warfarin management in the future, please send a new referral    Bro Smith RN

## 2023-08-11 NOTE — TELEPHONE ENCOUNTER
Spoke with Horacio.    Continue Eliquis and Plavix.    No ASA or warfarin.    All questions answered and Horacio stated understanding of the instructions

## 2023-08-26 ENCOUNTER — TRANSFERRED RECORDS (OUTPATIENT)
Dept: HEALTH INFORMATION MANAGEMENT | Facility: CLINIC | Age: 58
End: 2023-08-26
Payer: COMMERCIAL

## 2023-09-18 ENCOUNTER — TELEPHONE (OUTPATIENT)
Dept: FAMILY MEDICINE | Facility: CLINIC | Age: 58
End: 2023-09-18

## 2023-09-18 ENCOUNTER — E-VISIT (OUTPATIENT)
Dept: FAMILY MEDICINE | Facility: CLINIC | Age: 58
End: 2023-09-18
Payer: COMMERCIAL

## 2023-09-18 ENCOUNTER — MYC MEDICAL ADVICE (OUTPATIENT)
Dept: FAMILY MEDICINE | Facility: CLINIC | Age: 58
End: 2023-09-18
Payer: COMMERCIAL

## 2023-09-18 DIAGNOSIS — M10.9 ACUTE GOUT OF LEFT ANKLE, UNSPECIFIED CAUSE: ICD-10-CM

## 2023-09-18 PROCEDURE — 99421 OL DIG E/M SVC 5-10 MIN: CPT | Performed by: PHYSICIAN ASSISTANT

## 2023-09-18 RX ORDER — PREDNISONE 20 MG/1
TABLET ORAL
Qty: 10 TABLET | Refills: 0 | OUTPATIENT
Start: 2023-09-18

## 2023-09-18 RX ORDER — PREDNISONE 20 MG/1
TABLET ORAL
Qty: 10 TABLET | Refills: 0 | Status: SHIPPED | OUTPATIENT
Start: 2023-09-18 | End: 2024-07-16

## 2023-09-18 NOTE — TELEPHONE ENCOUNTER
"Message from patient: \"Pt is in a lot of pain in is right foot and ankle, hoping to get steroids but declined scheduling an appt at this time\"            Should they schedule with you or ortho? I believe they'll need an appointment regardless. [facility] didn't do great job with message.          Sebastian Leone      "

## 2023-10-25 ENCOUNTER — OFFICE VISIT (OUTPATIENT)
Dept: CARDIOLOGY | Facility: CLINIC | Age: 58
End: 2023-10-25
Attending: INTERNAL MEDICINE
Payer: COMMERCIAL

## 2023-10-25 VITALS
HEIGHT: 74 IN | WEIGHT: 282.7 LBS | DIASTOLIC BLOOD PRESSURE: 89 MMHG | BODY MASS INDEX: 36.28 KG/M2 | SYSTOLIC BLOOD PRESSURE: 129 MMHG | OXYGEN SATURATION: 99 % | HEART RATE: 63 BPM

## 2023-10-25 DIAGNOSIS — I48.0 PAROXYSMAL ATRIAL FIBRILLATION (H): ICD-10-CM

## 2023-10-25 DIAGNOSIS — I47.19 ATRIAL TACHYCARDIA, PAROXYSMAL (H): Primary | ICD-10-CM

## 2023-10-25 PROCEDURE — 93005 ELECTROCARDIOGRAM TRACING: CPT

## 2023-10-25 PROCEDURE — G0463 HOSPITAL OUTPT CLINIC VISIT: HCPCS | Performed by: INTERNAL MEDICINE

## 2023-10-25 PROCEDURE — 99215 OFFICE O/P EST HI 40 MIN: CPT | Mod: GC | Performed by: INTERNAL MEDICINE

## 2023-10-25 PROCEDURE — 93010 ELECTROCARDIOGRAM REPORT: CPT | Performed by: INTERNAL MEDICINE

## 2023-10-25 ASSESSMENT — PAIN SCALES - GENERAL: PAINLEVEL: NO PAIN (0)

## 2023-10-25 NOTE — NURSING NOTE
Chief Complaint   Patient presents with    Follow Up     2 mo follow-up AT  PFO closure. Hx CVA 4/2023. Review biotel.       Vitals were taken, medications reviewed, and EKG performed.    Christiana Oquendo, EMT   10:40 AM

## 2023-10-25 NOTE — LETTER
10/25/2023      RE: Horacio Panda  [id number] [facility] [id number]       Dear Colleague,    Thank you for the opportunity to participate in the care of your patient, Horacio Panda, at the [facility] [place] at [facility]. Please see a copy of my visit note below.        ELECTROPHYSIOLOGY CLINIC VISIT    Assessment/Recommendations   Assessment/Plan:    Mr. Panda is a 58 year old male who has a medical history significant for PFO s/p percutaneous closure 6/2023, CVA 4/2023, HTN, HLD, and arthritis.     Paroxsymal Atrial Tachycardia / Possible Atrial Fibrillation:   1. Stroke Prophylaxis:  CHADSVASC=++CVA, +HTN  3, corresponding to a 3.2% annual stroke / systemic emolism event rate.  He has been recently started on Warfarin, tolerating well thus far. Followed by Coumadin Clinic. Patient had repeat MCOT which demonstrated runs of irregular heart rhythm that were atrial fibrillation or atrial tachycardia. Given recent CVA agree with anticoagulation. Can consider chronicity at further follow ups depending course if AT appears to be more post opeartive then could consider stopping.   2. Rate Control: Continue Metoprolol.   3. Rhythm Control: Cardioversion, Antiarrhythmics and/or ablation are options for rhythm control. Monitoring had a day with some AT. He is not having a high burden or symptoms at this time. Will defer further rhythm control measures for now. Will extend MCOT to wear for 1 month for occult AF monitoring.  Discussed that the AT and his prior symptoms could all be provoked after percutaneous PFO closure. However, given CVA need to also consider this risk factor.  There is suspicion for cardio-embolic causation. Studies have consistently shown higher yield for occult AF with implantable monitoring. Therefore, we agree to consider ILR implant if needed for further monitoring.   4. Risk Factor Management: Statin, BP control, and CARLENE  evaluation as indicated.    Will follow up with patient in 1 year with EP IWLBUR.  Patient seen and discussed with Dr. Shah.  Kevin Mccall MD PhD  Cardiac Electrophysiology Fellow  P: Text Page        History of Present Illness/Subjective    Mr. Horacio Panda is a 58 year old male who comes in today for EP consultation of concern for PAF.    Mr. Panda is a 58 year old male who has a medical history significant for PFO s/p percutaneous closure 6/2023, CVA 4/2023, HTN, HLD, and arthritis.     He suffered an acute CVA of the right posterior denise in 4/2023 and work up showed a PFO by ESTELA. After discussion with stroke neurology empiric DOAC started given PFO and he was referred to structural cardiology for consideration of PFO closure. He underwent successful PFO with a 30 mm GORE cardioform on 6/16/23. Post procedure ECHO showed normal LV function without pericardial effusion. He was discharged on aspirin and Plavix, anticoagulation was discontinued.  He is having fairly frequent episodes of fluttering in his chest. Episodes can last minutes to hours. When the episodes come on, he feels very tired. He checked his vitals during one of the episodes last week and his BP was low 98/74 and HR 80. He was a little dizzy during this episode as well.  He was placed on MCOT monitor and there was concern for PAF, he was started on Warfarin, and he was referred to EP for evaluation.     EP Follow up 8/9/2023: He reports feeling well. He states he has not had any further palpitations even during time MCOT  He denies chest discomfort, palpitations, abdominal fullness/bloating or peripheral edema, shortness of breath, paroxysmal nocturnal dyspnea, orthopnea, lightheadedness, dizziness, pre-syncope, or syncope. He has only been on MCTO for a couple weeks. Thus far review of tracings show runs of AT. Presenting 12 lead ECG shows SB Vent Rate 57 bpm,  ms, QRS 90 ms, QTc 410 ms. Current cardiac medications include: Metoprolol,  Losartan, Norvasc, Lipitor, ASA, and Warfarin.     EP Follow Up 10/25/2023: Patient presents today in follow up. He is feeling well; he has no fevers, chills, chest pain, SOB, abdominal pain, nausea, vomiting, or diarrhea.     I have reviewed and updated the patient's Past Medical History, Social History, Family History and Medication List.     Cardiographics (Personally Reviewed) :   7/28/23 Echo:  Interpretation Summary  Global and regional left ventricular function is normal with an EF of 55-60%.  Global right ventricular function is normal.  No significant valvular abnormalities present.  Post PFO closure with no shunt evident on color Doppler.  Small inferior vena cava size consistent with hypovolemia.  No pericardial effusion is present.    6/16/23 Echo:   Interpretation Summary  Global and regional left ventricular function is normal with an EF of 60-65%.  Global right ventricular function is normal.  s/p PFO closure with 30mm Cardioform septal occluder device. Closure device  not well seen in these limited views.  There was no shunt at the atrial septal level as assessed by color Doppler.  No pericardial effusion is present.       Physical Examination   There were no vitals taken for this visit.  Wt Readings from Last 3 Encounters:   08/09/23 125.1 kg (275 lb 14.4 oz)   07/28/23 122.4 kg (269 lb 12.8 oz)   06/26/23 125.1 kg (275 lb 11.2 oz)     General Appearance:   Alert, well-appearing and in no acute distress.   HEENT: Atraumatic, normocephalic. PERRL.  MMM.   Chest/Lungs:   Respirations unlabored.  Lungs are clear to auscultation.   Cardiovascular:   Regular rate and rhythm.  S1/S2. No murmur.    Abdomen:  Soft, nontender, nondistended.   Extremities: No cyanosis or clubbing. No edema.    Musculoskeletal: Moves all extremities.  Gait normal.   Skin: Warm, dry, intact.    Neurologic: Mood and affect are appropriate.  Alert and oriented to person, place, time, and situation.          Medications  Allergies    Current Outpatient Medications   Medication Sig Dispense Refill    amLODIPine (NORVASC) 10 MG tablet Take 1 tablet (10 mg) by mouth daily 90 tablet 1    apixaban ANTICOAGULANT (ELIQUIS) 5 MG tablet Take 1 tablet (5 mg) by mouth 2 times daily 90 tablet 3    aspirin 81 MG EC tablet Take 1 tablet (81 mg) by mouth daily 90 tablet 3    atorvastatin (LIPITOR) 80 MG tablet Take 1 tablet (80 mg) by mouth daily (Patient not taking: Reported on 8/9/2023) 90 tablet 1    clopidogrel (PLAVIX) 75 MG tablet Take 1 tablet (75 mg) by mouth daily 90 tablet 3    losartan (COZAAR) 50 MG tablet Take 1 tablet (50 mg) by mouth daily 90 tablet 1    metoprolol tartrate (LOPRESSOR) 25 MG tablet Take 1 tablet (25 mg) by mouth 2 times daily 180 tablet 3    omeprazole (PRILOSEC) 20 MG DR capsule TAKE 1 CAPSULE BY MOUTH DAILY FOR 90 DAYS. TAKE 1 HOUR BEFORE A MEAL. 90 capsule 3    predniSONE (DELTASONE) 20 MG tablet 40 mg every morning for five days 10 tablet 0    No Known Allergies      Lab Results (Personally Reviewed)    Chemistry/lipid CBC Cardiac Enzymes/BNP/TSH/INR   Lab Results   Component Value Date    BUN 15.7 07/28/2023     07/28/2023    CO2 25 07/28/2023     Creatinine   Date Value Ref Range Status   07/28/2023 1.22 (H) 0.67 - 1.17 mg/dL Final   03/27/2018 1.11 0.66 - 1.25 mg/dL Final       Lab Results   Component Value Date    CHOL 173 04/05/2022    HDL 49 04/05/2022     (H) 04/05/2022      Lab Results   Component Value Date    WBC 7.5 07/28/2023    HGB 15.6 07/28/2023    HCT 45.5 07/28/2023    MCV 96 07/28/2023     07/28/2023    Lab Results   Component Value Date    INR 1.01 04/10/2023        EP STAFF NOTE  Patient seen and examined and management plan personally reviewed with the patient. I agree with the note above by the CV/EP fellow.  Mahad Shah MD [facility]  Cardiology - Electrophysiology  Total time spent on patient visit, reviewing notes, imaging, labs, orders, and completing necessary documentation:  45 minutes.  >50% of visit spent on counseling patient and/or coordination of care.

## 2023-10-25 NOTE — PROGRESS NOTES
ELECTROPHYSIOLOGY CLINIC VISIT    Assessment/Recommendations   Assessment/Plan:    Mr. Panda is a 58 year old male who has a medical history significant for PFO s/p percutaneous closure 6/2023, CVA 4/2023, HTN, HLD, and arthritis.     Paroxsymal Atrial Tachycardia / Possible Atrial Fibrillation:   1. Stroke Prophylaxis:  CHADSVASC=++CVA, +HTN  3, corresponding to a 3.2% annual stroke / systemic emolism event rate.  He has been recently started on Warfarin, tolerating well thus far. Followed by Coumadin Clinic. Patient had repeat MCOT which demonstrated runs of irregular heart rhythm that were atrial fibrillation or atrial tachycardia. Given recent CVA agree with anticoagulation. Can consider chronicity at further follow ups depending course if AT appears to be more post opeartive then could consider stopping.   2. Rate Control: Continue Metoprolol.   3. Rhythm Control: Cardioversion, Antiarrhythmics and/or ablation are options for rhythm control. Monitoring had a day with some AT. He is not having a high burden or symptoms at this time. Will defer further rhythm control measures for now. Will extend MCOT to wear for 1 month for occult AF monitoring.  Discussed that the AT and his prior symptoms could all be provoked after percutaneous PFO closure. However, given CVA need to also consider this risk factor.  There is suspicion for cardio-embolic causation. Studies have consistently shown higher yield for occult AF with implantable monitoring. Therefore, we agree to consider ILR implant if needed for further monitoring.   4. Risk Factor Management: Statin, BP control, and CARLENE evaluation as indicated.    Will follow up with patient in 1 year with EP WILBUR.  Patient seen and discussed with Dr. Shah.  Kevin Mccall MD PhD  Cardiac Electrophysiology Fellow  P: Text Page        History of Present Illness/Subjective    Mr. Horacio Panda is a 58 year old male who comes in today for EP consultation of concern for  PAF.    Mr. Panda is a 58 year old male who has a medical history significant for PFO s/p percutaneous closure 6/2023, CVA 4/2023, HTN, HLD, and arthritis.     He suffered an acute CVA of the right posterior denise in 4/2023 and work up showed a PFO by ESTELA. After discussion with stroke neurology empiric DOAC started given PFO and he was referred to structural cardiology for consideration of PFO closure. He underwent successful PFO with a 30 mm GORE cardioform on 6/16/23. Post procedure ECHO showed normal LV function without pericardial effusion. He was discharged on aspirin and Plavix, anticoagulation was discontinued.  He is having fairly frequent episodes of fluttering in his chest. Episodes can last minutes to hours. When the episodes come on, he feels very tired. He checked his vitals during one of the episodes last week and his BP was low 98/74 and HR 80. He was a little dizzy during this episode as well.  He was placed on MCOT monitor and there was concern for PAF, he was started on Warfarin, and he was referred to EP for evaluation.     EP Follow up 8/9/2023: He reports feeling well. He states he has not had any further palpitations even during time MCOT  He denies chest discomfort, palpitations, abdominal fullness/bloating or peripheral edema, shortness of breath, paroxysmal nocturnal dyspnea, orthopnea, lightheadedness, dizziness, pre-syncope, or syncope. He has only been on MCTO for a couple weeks. Thus far review of tracings show runs of AT. Presenting 12 lead ECG shows SB Vent Rate 57 bpm,  ms, QRS 90 ms, QTc 410 ms. Current cardiac medications include: Metoprolol, Losartan, Norvasc, Lipitor, ASA, and Warfarin.     EP Follow Up 10/25/2023: Patient presents today in follow up. He is feeling well; he has no fevers, chills, chest pain, SOB, abdominal pain, nausea, vomiting, or diarrhea.     I have reviewed and updated the patient's Past Medical History, Social History, Family History and Medication List.      Cardiographics (Personally Reviewed) :   7/28/23 Echo:  Interpretation Summary  Global and regional left ventricular function is normal with an EF of 55-60%.  Global right ventricular function is normal.  No significant valvular abnormalities present.  Post PFO closure with no shunt evident on color Doppler.  Small inferior vena cava size consistent with hypovolemia.  No pericardial effusion is present.    6/16/23 Echo:   Interpretation Summary  Global and regional left ventricular function is normal with an EF of 60-65%.  Global right ventricular function is normal.  s/p PFO closure with 30mm Cardioform septal occluder device. Closure device  not well seen in these limited views.  There was no shunt at the atrial septal level as assessed by color Doppler.  No pericardial effusion is present.       Physical Examination   There were no vitals taken for this visit.  Wt Readings from Last 3 Encounters:   08/09/23 125.1 kg (275 lb 14.4 oz)   07/28/23 122.4 kg (269 lb 12.8 oz)   06/26/23 125.1 kg (275 lb 11.2 oz)     General Appearance:   Alert, well-appearing and in no acute distress.   HEENT: Atraumatic, normocephalic. PERRL.  MMM.   Chest/Lungs:   Respirations unlabored.  Lungs are clear to auscultation.   Cardiovascular:   Regular rate and rhythm.  S1/S2. No murmur.    Abdomen:  Soft, nontender, nondistended.   Extremities: No cyanosis or clubbing. No edema.    Musculoskeletal: Moves all extremities.  Gait normal.   Skin: Warm, dry, intact.    Neurologic: Mood and affect are appropriate.  Alert and oriented to person, place, time, and situation.          Medications  Allergies   Current Outpatient Medications   Medication Sig Dispense Refill    amLODIPine (NORVASC) 10 MG tablet Take 1 tablet (10 mg) by mouth daily 90 tablet 1    apixaban ANTICOAGULANT (ELIQUIS) 5 MG tablet Take 1 tablet (5 mg) by mouth 2 times daily 90 tablet 3    aspirin 81 MG EC tablet Take 1 tablet (81 mg) by mouth daily 90 tablet 3     atorvastatin (LIPITOR) 80 MG tablet Take 1 tablet (80 mg) by mouth daily (Patient not taking: Reported on 8/9/2023) 90 tablet 1    clopidogrel (PLAVIX) 75 MG tablet Take 1 tablet (75 mg) by mouth daily 90 tablet 3    losartan (COZAAR) 50 MG tablet Take 1 tablet (50 mg) by mouth daily 90 tablet 1    metoprolol tartrate (LOPRESSOR) 25 MG tablet Take 1 tablet (25 mg) by mouth 2 times daily 180 tablet 3    omeprazole (PRILOSEC) 20 MG DR capsule TAKE 1 CAPSULE BY MOUTH DAILY FOR 90 DAYS. TAKE 1 HOUR BEFORE A MEAL. 90 capsule 3    predniSONE (DELTASONE) 20 MG tablet 40 mg every morning for five days 10 tablet 0    No Known Allergies      Lab Results (Personally Reviewed)    Chemistry/lipid CBC Cardiac Enzymes/BNP/TSH/INR   Lab Results   Component Value Date    BUN 15.7 07/28/2023     07/28/2023    CO2 25 07/28/2023     Creatinine   Date Value Ref Range Status   07/28/2023 1.22 (H) 0.67 - 1.17 mg/dL Final   03/27/2018 1.11 0.66 - 1.25 mg/dL Final       Lab Results   Component Value Date    CHOL 173 04/05/2022    HDL 49 04/05/2022     (H) 04/05/2022      Lab Results   Component Value Date    WBC 7.5 07/28/2023    HGB 15.6 07/28/2023    HCT 45.5 07/28/2023    MCV 96 07/28/2023     07/28/2023    Lab Results   Component Value Date    INR 1.01 04/10/2023        EP STAFF NOTE  Patient seen and examined and management plan personally reviewed with the patient. I agree with the note above by the CV/EP fellow.  Mahad Shah MD [facility]  Cardiology - Electrophysiology  Total time spent on patient visit, reviewing notes, imaging, labs, orders, and completing necessary documentation: 45 minutes.  >50% of visit spent on counseling patient and/or coordination of care.

## 2023-10-25 NOTE — PATIENT INSTRUCTIONS
Plan:   Follow-up in 1 year with EP WILBUR with 14 day ziopatch worn 1 month prior       Your Care Team:  EP Cardiology   Telephone Number     Emily Justin RN [phone]    After business hours: [phone], ask for cardiologist on-call   Non-procedure scheduling:    Windy NORRIS   [phone]   Procedure scheduling:    Paris Gonsales   [phone]   Device Clinic (Pacemakers, ICDs, Loop Recorders)    During business hours: [phone]  After business hours:   [phone]- select option 4 and ask for job code [id number].       Cardiovascular Clinic:   [address] [place] [address]. [place] [address]      As always, thank you for trusting us with your health care needs!

## 2023-10-27 LAB
ATRIAL RATE - MUSE: 62 BPM
DIASTOLIC BLOOD PRESSURE - MUSE: NORMAL MMHG
INTERPRETATION ECG - MUSE: NORMAL
P AXIS - MUSE: 17 DEGREES
PR INTERVAL - MUSE: 182 MS
QRS DURATION - MUSE: 92 MS
QT - MUSE: 404 MS
QTC - MUSE: 410 MS
R AXIS - MUSE: 22 DEGREES
SYSTOLIC BLOOD PRESSURE - MUSE: NORMAL MMHG
T AXIS - MUSE: 33 DEGREES
VENTRICULAR RATE- MUSE: 62 BPM

## 2023-11-07 DIAGNOSIS — E78.00 HIGH CHOLESTEROL: ICD-10-CM

## 2023-11-07 RX ORDER — ATORVASTATIN CALCIUM 80 MG/1
80 TABLET, FILM COATED ORAL DAILY
Qty: 90 TABLET | Refills: 0 | Status: SHIPPED | OUTPATIENT
Start: 2023-11-07 | End: 2024-02-06

## 2023-11-16 ENCOUNTER — OFFICE VISIT (OUTPATIENT)
Dept: PODIATRY | Facility: CLINIC | Age: 58
End: 2023-11-16
Payer: COMMERCIAL

## 2023-11-16 VITALS
BODY MASS INDEX: 36.19 KG/M2 | SYSTOLIC BLOOD PRESSURE: 146 MMHG | DIASTOLIC BLOOD PRESSURE: 91 MMHG | WEIGHT: 282 LBS | HEART RATE: 63 BPM | HEIGHT: 74 IN

## 2023-11-16 DIAGNOSIS — M76.61 TENDONITIS, ACHILLES, RIGHT: Primary | ICD-10-CM

## 2023-11-16 PROCEDURE — 99203 OFFICE O/P NEW LOW 30 MIN: CPT | Performed by: PODIATRIST

## 2023-11-16 ASSESSMENT — PAIN SCALES - GENERAL: PAINLEVEL: MILD PAIN (3)

## 2023-11-16 NOTE — PROGRESS NOTES
PATIENT HISTORY:  Horacio Panda is a 58 year old male who presents to clinic as a self referral with a chief complaint of right heel pain.  The patient is seen by themselves.  The patient relates the pain is primarily located around the back of the heel.  Reports insidious onset without acute precipitating event.  The patient relates that the symptoms have been going on for several month(s).  The patient has previously tried different shoes, and rest with little relief.  The patient is currently employed as Printer .  Any previous notes and studies that pertain to the patient's condition were reviewed.    Pertinent medical, surgical and family history was reviewed in the [facility] chart.    Past Medical History:   Past Medical History:   Diagnosis Date    Arthritis, hip     Lft Hip    Hyperuricemia     to 9.6    Stroke (H) 04/2023    cerebrocortex       Medications:   Current Outpatient Medications:     apixaban ANTICOAGULANT (ELIQUIS) 5 MG tablet, Take 1 tablet (5 mg) by mouth 2 times daily, Disp: 90 tablet, Rfl: 3    atorvastatin (LIPITOR) 80 MG tablet, Take 1 tablet (80 mg) by mouth daily, Disp: 90 tablet, Rfl: 0    clopidogrel (PLAVIX) 75 MG tablet, Take 1 tablet (75 mg) by mouth daily, Disp: 90 tablet, Rfl: 3    metoprolol tartrate (LOPRESSOR) 25 MG tablet, Take 1 tablet (25 mg) by mouth 2 times daily, Disp: 180 tablet, Rfl: 3    omeprazole (PRILOSEC) 20 MG DR capsule, TAKE 1 CAPSULE BY MOUTH DAILY FOR 90 DAYS. TAKE 1 HOUR BEFORE A MEAL., Disp: 90 capsule, Rfl: 3    amLODIPine (NORVASC) 10 MG tablet, Take 1 tablet (10 mg) by mouth daily, Disp: 90 tablet, Rfl: 0    aspirin 81 MG EC tablet, Take 1 tablet (81 mg) by mouth daily (Patient not taking: Reported on 10/25/2023), Disp: 90 tablet, Rfl: 3    losartan (COZAAR) 50 MG tablet, Take 1 tablet (50 mg) by mouth daily, Disp: 90 tablet, Rfl: 0    predniSONE (DELTASONE) 20 MG tablet, 40 mg every morning for five days (Patient not taking: Reported on 11/16/2023), Disp: 10  "tablet, Rfl: 0     Allergies:  No Known Allergies    Vitals: BP (!) 146/91   Pulse 63   Ht 1.875 m (6' 1.82\")   Wt 127.9 kg (282 lb)   BMI 36.38 kg/m    BMI= Body mass index is 36.38 kg/m .    LOWER EXTREMITY PHYSICAL EXAM    Dermatologic: Skin is intact to right lower extremity without significant lesions, rash or abrasion.        Vascular: DP & PT pulses are intact & regular on the right.   CFT and skin temperature is normal to the right lower extremity.     Neurologic: Lower extremity sensation is intact to light touch.  No evidence of weakness in the right lower extremity.        Musculoskeletal: Patient is ambulatory without assistive device or brace.  No gross ankle deformity noted.  No foot or ankle joint effusion is noted.  Noted pain on palpation on the posterior aspect of the right heel near the insertion point of the Achilles tendon.  Noted tight gastroc complex on the right lower extremity.           ASSESSMENT / PLAN:     ICD-10-CM    1. Tendonitis, Achilles, right  M76.61 Ankle/Foot Bracing Supplies DME Heel Lift; Right          I have explained to Horacio about the conditions.  We discussed the underlying contributing factors to the condition as well as both conservative and surgical treatment options along with expected length of recovery.  At this time, the patient was educated on the importance of offloading supportive shoes and other devices.  I demonstrated to the patient calf stretches to perform every hour daily until symptoms resolve.  After symptoms resolve, the patient was advised to perform the stretches 3 times daily to prevent future recurrence.  The patient was instructed to perform warm soaks with Epson salt after which to also apply over-the-counter Voltaren gel to deeply massage the injured tissue.  The patient was instructed to do this on a daily basis until symptoms resolve.   The patient was fitted with a quarter inch heel lift that will aid in offloading the tension forces to the " soft tissues and prevent further inflammation.   The patient may return in four weeks for reevaluation to determine if any further treatment will be needed.      Horacio verbalized agreement with and understanding of the rational for the diagnosis and treatment plan.  All questions were answered to best of my ability and the patient's satisfaction. The patient was advised to contact the clinic with any questions that may arise after the clinic visit.      Disclaimer: This note consists of symbols derived from keyboarding, dictation and/or voice recognition software. As a result, there may be errors in the script that have gone undetected. Please consider this when interpreting information found in this chart.       GABE Davis D.P.M., F.SERA.DES.F.A.S.

## 2023-11-16 NOTE — PATIENT INSTRUCTIONS
Next Steps:      Support:  Wear supportive shoes, sandals, boots and/or inserts that have a rigid supportive sole.    This will offload the majority of tension forces that travel through your feet every step you take.    Skechers Max Cushioning Elite/Premier   Skechers Relax Fit D'Lux Walker  Reebok Walk Ultra 7 DMX MAX   Hoka Bondi walking shoes  Farmia shoes/slippers (https://Robotoki.OpenSynergy)  Machine Safety Manangement sandals (https://www.[employer] State Technologies/us)  Superfeet inserts (www.superfeet.com)  It is important that you also wear supportive shoe wear in the house to continue providing support to your feet.    You may always use a cushioned liner for your shoes if that makes your feet feel better.  Stretching  Calf stretching is essential to offload the tension forces that travel through your feet every step you take  Preferred calf stretch is the Runner's Stretch  Place one foot behind the other foot, flat against the ground (it is important to keep the heel on the ground).  The back leg is the one that will be stretched.  Start with the knee straight and lean your hips into the wall, counter or whatever you are leaning into - count to ten.  Next, bend the knee.  You should feel the stretch lower in the calf muscle - count to ten.  Repeat this stretch once an hour to start off with.  When symptoms subside, I recommend performing the stretch 3 times daily to prevent any future problems.                Tissue Massage  It is important that you physically loosen the inflammation tissue to help your body heal the injured tissue.  I recommend soaking your foot in warm water to increase the microcirculation to the soft tissues.  You may add Epson salt to the water if you prefer.  You may apply an over-the-counter muscle rub, such as Voltaren gel, and deeply massage the injured tissue.  Reduce Inflammation  You can ice the injured tissue with an ice pack with a light cloth covering or soaking in ice water 20 minutes to reduce  any acute inflammation, typically at the end of the day.      It is important to understand that most problems that develop in the foot and ankle are caused by excessive tension that cause microinjury to the soft tissues and inflammation in the foot and ankle.  By addressing the underlying causes with support and stretching as well as treating the current inflammatory conditions with tissue massage and anti-inflammatory treatments, most foot and ankle musculoskeletal conditions will resolve.  This may take time to heal.  However, if symptoms persist past 4 weeks you should return to the office for reevaluation to determine further treatment options.      Flu vaccines are now available at all [facility] clinics and retail pharmacies across the [facility]. Appointments are required for clinic locations. To schedule an appointment online, please log into [employer] or create an account if you are a new user. You can also call [phone], or simply walk in at one of the [facility] retail pharmacy locations.      [employer] - Login Page

## 2023-11-16 NOTE — NURSING NOTE
"Chief Complaint   Patient presents with    Consult     Right heel pain       Initial BP (!) 146/91   Pulse 63   Ht 1.875 m (6' 1.82\")   Wt 127.9 kg (282 lb)   BMI 36.38 kg/m   Estimated body mass index is 36.38 kg/m  as calculated from the following:    Height as of this encounter: 1.875 m (6' 1.82\").    Weight as of this encounter: 127.9 kg (282 lb).  Medications and allergies reviewed.      Miriam VALENCIA MA    "

## 2023-11-16 NOTE — Clinical Note
11/16/2023         RE: Horacio Panda  [id number] [address] [facility] [id number]        Dear Colleague,    Thank you for referring your patient, Horacio Panda, to the [facility] [place]. Please see a copy of my visit note below.    PATIENT HISTORY:  Horacio Panda is a 58 year old male who presents to clinic as a self referral with a chief complaint of right heel pain.  The patient is seen by themselves.  The patient relates the pain is primarily located around the back of the heel.  Reports insidious onset without acute precipitating event.  The patient relates that the symptoms have been going on for several month(s).  The patient has previously tried different shoes, and rest with little relief.  The patient is currently employed as Printer .  Any previous notes and studies that pertain to the patient's condition were reviewed.    Pertinent medical, surgical and family history was reviewed in the Epic chart.    Past Medical History:   Past Medical History:   Diagnosis Date    Arthritis, hip     Lft Hip    Hyperuricemia     to 9.6    Stroke (H) 04/2023    cerebrocortex       Medications:   Current Outpatient Medications:     amLODIPine (NORVASC) 10 MG tablet, Take 1 tablet (10 mg) by mouth daily, Disp: 90 tablet, Rfl: 1    apixaban ANTICOAGULANT (ELIQUIS) 5 MG tablet, Take 1 tablet (5 mg) by mouth 2 times daily, Disp: 90 tablet, Rfl: 3    aspirin 81 MG EC tablet, Take 1 tablet (81 mg) by mouth daily (Patient not taking: Reported on 10/25/2023), Disp: 90 tablet, Rfl: 3    atorvastatin (LIPITOR) 80 MG tablet, Take 1 tablet (80 mg) by mouth daily, Disp: 90 tablet, Rfl: 0    clopidogrel (PLAVIX) 75 MG tablet, Take 1 tablet (75 mg) by mouth daily, Disp: 90 tablet, Rfl: 3    losartan (COZAAR) 50 MG tablet, Take 1 tablet (50 mg) by mouth daily, Disp: 90 tablet, Rfl: 1    metoprolol tartrate (LOPRESSOR) 25 MG tablet, Take 1 tablet (25 mg) by mouth 2 times daily, Disp: 180 tablet, Rfl: 3     omeprazole (PRILOSEC) 20 MG DR capsule, TAKE 1 CAPSULE BY MOUTH DAILY FOR 90 DAYS. TAKE 1 HOUR BEFORE A MEAL., Disp: 90 capsule, Rfl: 3    predniSONE (DELTASONE) 20 MG tablet, 40 mg every morning for five days, Disp: 10 tablet, Rfl: 0     Allergies:  No Known Allergies    Vitals: There were no vitals taken for this visit.  BMI= There is no height or weight on file to calculate BMI.    LOWER EXTREMITY PHYSICAL EXAM    Dermatologic: Skin is intact to {RIGHT /LEFT:354635} lower extremity without significant lesions, rash or abrasion.        Vascular: DP & PT pulses are intact & regular on the {RIGHT /LEFT:823593}.   CFT and skin temperature is normal to the {RIGHT /LEFT:986976} lower extremity.     Neurologic: Lower extremity sensation is intact to light touch.  No evidence of weakness in the {RIGHT /LEFT:368498} lower extremity.        Musculoskeletal: Patient is ambulatory without assistive device or brace.  No gross ankle deformity noted.  No foot or ankle joint effusion is noted.  Noted ***    Diagnostics:  Radiographs included three views of the {RIGHT LEFT BOTH NO:888986} foot demonstrating *** no cortical erosions or periosteal elevation.  All joint margins appear stable.  There is no apparent fracture or tumor formation noted.  There is no evidence of foreign body.  The images were independently reviewed by myself along with the patient explaining the findings.      ASSESSMENT / PLAN:   No diagnosis found.    I have explained to Horacio about the conditions.  We discussed the underlying contributing factors to the condition as well as both conservative and surgical treatment options along with expected length of recovery.  At this time, ***    Horacio verbalized agreement with and understanding of the rational for the diagnosis and treatment plan.  All questions were answered to best of my ability and the patient's satisfaction. The patient was advised to contact the clinic with any questions that may arise after the  clinic visit.      Disclaimer: This note consists of symbols derived from keyboarding, dictation and/or voice recognition software. As a result, there may be errors in the script that have gone undetected. Please consider this when interpreting information found in this chart.       GABE Davis D.P.M., F.SERA.DES.F.A.S.        Again, thank you for allowing me to participate in the care of your patient.        Sincerely,        Horacio Davis DPM

## 2023-12-07 DIAGNOSIS — I10 ESSENTIAL HYPERTENSION WITH GOAL BLOOD PRESSURE LESS THAN 140/90: ICD-10-CM

## 2023-12-07 RX ORDER — AMLODIPINE BESYLATE 10 MG/1
10 TABLET ORAL DAILY
Qty: 90 TABLET | Refills: 0 | Status: SHIPPED | OUTPATIENT
Start: 2023-12-07 | End: 2024-03-12

## 2023-12-07 RX ORDER — LOSARTAN POTASSIUM 50 MG/1
50 TABLET ORAL DAILY
Qty: 90 TABLET | Refills: 0 | Status: SHIPPED | OUTPATIENT
Start: 2023-12-07 | End: 2024-03-11

## 2023-12-10 NOTE — PROGRESS NOTES
"    STRUCTURAL HEART CARE  CARDIOVASCULAR DIVISION  RETURN VISIT      HPI:     Horacio Panda 58 year old who presents for 6 month PFO closure follow-up. Patient has a history of HTN, HLD and acute CVA of the right posterior denise April 2023 at which time he was noted to have PFO by ESTELA. After discussion with stroke neurology empiric DOAC started given PFO and referred to structural cardiology for consideration of PFO closure. He was noted to have negative hypercoagulable work up and no AF on cardiac monitor thus underwent successful PFO with a 30 mm GORE cardioform on 6/16/23. Post procedure ECHO showed normal LV function without pericardial effusion. He was discharged on aspirin and Plavix, anticoagulation was discontinued. Post procedure developed palpitations. Cardiac monitor showed a-fib vs a-tach. Metoprolol and anticoagulation initiated.     Since his last visit he has been feeling alright. He is walking 30-40 minutes a day with no significant chest pain. He reports occasional shortness of breath which he attributes to being overweight and deconditioning. Heart fluttering and symptoms of feeling \"off\" have subsided. He has no lightheadedness, syncope, heart failure. Home /80.     He is tolerating Eliquis without bleeding concerns. Would like to switch over to warfarin due to cost.      PAST MEDICAL HISTORY:   Arthritis  CVA 5/2023  PFO s/p PFO closure 6/2023  Post procedure AF  HTN  HLD     PAST SURGICAL HISTORY:     Past Surgical History:   Procedure Laterality Date    CV PFO CLOSURE N/A 6/16/2023    Procedure: Heart Cath PFO Closure;  Surgeon: Gilbert Stone MD;  Location:  HEART CARDIAC CATH LAB    [facility] ARTHROPLASTY I-P JT  Age 26    Lft Hip      CURRENT MEDICATIONS:     Current Outpatient Medications   Medication    amLODIPine (NORVASC) 10 MG tablet    atorvastatin (LIPITOR) 80 MG tablet    losartan (COZAAR) 50 MG tablet    metoprolol tartrate (LOPRESSOR) 25 MG tablet    omeprazole (PRILOSEC) 20 " "MG DR capsule    warfarin ANTICOAGULANT (COUMADIN) 5 MG tablet    predniSONE (DELTASONE) 20 MG tablet     No current facility-administered medications for this visit.        ALLERGIES:      No Known Allergies     FAMILY HISTORY:     Family History   Problem Relation Age of Onset    Hypertension Mother     Cancer Father     Hypertension Sister     Cancer Maternal Grandfather     Cerebrovascular Disease Paternal Grandmother     Heart Disease Paternal Grandfather       SOCIAL HISTORY:     Social History     Tobacco Use    Smoking status: Never    Smokeless tobacco: Never   Substance Use Topics    Alcohol use: Yes     Comment: occ        REVIEW OF SYSTEMS:     Constitutional: No fevers or chills  Skin: No new rash or itching  Eyes: No acute change in vision  Ears/Nose/Throat: No purulent rhinorrhea, new hearing loss, or new vertigo  Respiratory: No cough or hemoptysis  Cardiovascular: See HPI  Gastrointestinal: No change in appetite, vomiting, hematemesis or diarrhea  Genitourinary: No dysuria or hematuria  Musculoskeletal: No new back pain, neck pain or muscle pain  Neurologic: No new headaches, focal weakness or behavior changes  Psychiatric: No hallucinations, excessive alcohol consumption or illegal drug usage  Hematologic/Lymphatic/Immunologic: No bleeding, chills, fever, night sweats or weight loss  Endocrine: No new cold intolerance, heat intolerance, polyphagia, polydipsia or polyuria      PHYSICAL EXAMINATION:     /87 (BP Location: Right arm, Patient Position: Sitting, Cuff Size: Adult Large)   Pulse 54   Ht 1.875 m (6' 1.82\")   Wt 129.6 kg (285 lb 12.8 oz)   SpO2 98%   BMI 36.87 kg/m      GENERAL: No acute distress.  HEENT: EOMI. Sclerae white, not injected. Nares clear. Pharynx without erythema or exudate.   Neck: No adenopathy. No thyromegaly. No jugular venous distension.   Heart: Regular rate and rhythm. No murmur.   Lungs: Clear to auscultation. No ronchi, wheezes, rales.   Abdomen: Soft, " nontender, nondistended. Bowel sounds present.  Extremities: No clubbing, cyanosis, or edema.   Neurologic: Alert and oriented to person/place/time, normal speech and affect. No focal deficits.  Skin: No petechiae, purpura or rash.     LABORATORY DATA:     Admission on 06/16/2023, Discharged on 06/16/2023   Component Date Value Ref Range Status    Sodium 06/16/2023 141  136 - 145 mmol/L Final    Potassium 06/16/2023 4.4  3.4 - 5.3 mmol/L Final    Chloride 06/16/2023 105  98 - 107 mmol/L Final    Carbon Dioxide (CO2) 06/16/2023 24  22 - 29 mmol/L Final    Anion Gap 06/16/2023 12  7 - 15 mmol/L Final    Urea Nitrogen 06/16/2023 14.1  6.0 - 20.0 mg/dL Final    Creatinine 06/16/2023 1.09  0.67 - 1.17 mg/dL Final    Calcium 06/16/2023 9.2  8.6 - 10.0 mg/dL Final    Glucose 06/16/2023 106 (H)  70 - 99 mg/dL Final    GFR Estimate 06/16/2023 79  >60 mL/min/1.73m2 Final    eGFR calculated using 2021 CKD-EPI equation.    WBC Count 06/16/2023 6.2  4.0 - 11.0 10e3/uL Final    RBC Count 06/16/2023 4.61  4.40 - 5.90 10e6/uL Final    Hemoglobin 06/16/2023 15.6  13.3 - 17.7 g/dL Final    Hematocrit 06/16/2023 44.4  40.0 - 53.0 % Final    MCV 06/16/2023 96  78 - 100 fL Final    MCH 06/16/2023 33.8 (H)  26.5 - 33.0 pg Final    MCHC 06/16/2023 35.1  31.5 - 36.5 g/dL Final    RDW 06/16/2023 11.5  10.0 - 15.0 % Final    Platelet Count 06/16/2023 263  150 - 450 10e3/uL Final    Ventricular Rate 06/16/2023 69  BPM Final    Atrial Rate 06/16/2023 69  BPM Final    NJ Interval 06/16/2023 186  ms Final    QRS Duration 06/16/2023 80  ms Final    QT 06/16/2023 376  ms Final    QTc 06/16/2023 402  ms Final    P Axis 06/16/2023 12  degrees Final    R AXIS 06/16/2023 12  degrees Final    T [place] 06/16/2023 8  degrees Final    Interpretation ECG 06/16/2023    Final                    Value:Sinus rhythm  Normal ECG  When compared with ECG of 15-SMITA-2023 10:17, (unconfirmed)  No significant change was found  Confirmed by fellow Reyes Castro, Jorge  ([id number]) on 6/19/2023 12:10:16 PM  Confirmed by MD BRAR JANE ([id number]) on 6/19/2023 2:03:28 PM      Activated Clotting Time (Celite) P* 06/16/2023 251 (H)  74 - 150 seconds Final    Activated Clotting Time (Celite) P* 06/16/2023 230 (H)  74 - 150 seconds Final    LVEF  06/16/2023 60-65%   Final        PROCEDURES & FURTHER ASSESSMENTS:   ECHO 12/11/23:  Interpretation Summary  An ASD closure device was seen in the expected anatomic position.The device  was well seated and without residual shunt by color Doppler mapping.  Ascending aorta 4.3 cm.Normal aortic index for BSA of 2.5m2.  No pericardial effusion is present.  Aortic size was not assessed in prior studies.  _________________________________      EKG 7/28/23  NSR HR 76 no arrhythmias     ECHO 7/28/23:  Interpretation Summary  Global and regional left ventricular function is normal with an EF of 55-60%.  Global right ventricular function is normal.  No significant valvular abnormalities present.  Post PFO closure with no shunt evident on color Doppler.  Small inferior vena cava size consistent with hypovolemia.  No pericardial effusion is present.  ______________________________________________________________________________  Left Ventricle  Left ventricular size is normal. Left ventricular wall thickness is normal.  Global and regional left ventricular function is normal with an EF of 55-60%.     Right Ventricle  The right ventricle is normal size. Global right ventricular function is  normal.     Atria  Both atria appear normal. s/p PFO closure with 30mm Cardioform septal occluder  device. Closure device not well seen in these limited views.     Mitral Valve  The mitral valve is normal.     Aortic Valve  Aortic valve is normal in structure and function.     Tricuspid Valve  The tricuspid valve is normal. Trace tricuspid insufficiency is present. The  right ventricular systolic pressure is approximated at 15.9 mmHg plus the  right atrial pressure. Pulmonary  artery systolic pressure is normal.     Pulmonic Valve  The pulmonic valve is normal.     Vessels  The aorta root is normal. Small inferior vena cava size consistent with  hypovolemia.     Pericardium  No pericardial effusion is present.     Miscellaneous  No significant valvular abnormalities present.     Compared to Previous Study  This study was compared with the study from 6.2023 . No significant changes  noted.    ECHO post procedure 6/16/23  Interpretation Summary  Global and regional left ventricular function is normal with an EF of 60-65%.  Global right ventricular function is normal.  s/p PFO closure with 30mm Cardioform septal occluder device. Closure device  not well seen in these limited views.  There was no shunt at the atrial septal level as assessed by color Doppler.  No pericardial effusion is present.     ______________________________________________________________________________  Left Ventricle  Left ventricular size is normal. Global and regional left ventricular function  is normal with an EF of 60-65%.     Right Ventricle  The right ventricle is normal size. Global right ventricular function is  normal.     Atria  There was no shunt at the atrial septal level as assessed by color Doppler.  s/p PFO closure with 30mm Cardioform septal occluder device.     Vessels  The inferior vena cava was normal in size with preserved respiratory  variability.     Pericardium  No pericardial effusion is present.     CLINICAL IMPRESSION:   Horacio Panda 58 year old who presents for 6 month PFO closure follow-up. Patient has a history of HTN, HLD and acute CVA of the right posterior denise April 2023 at which time he was noted to have PFO by ESTELA. After discussion with stroke neurology empiric DOAC started given PFO and referred to structural cardiology for consideration of PFO closure. He underwent successful PFO with a 30 mm GORE cardioform on 6/16/23. Post procedure ECHO showed normal LV function without  pericardial effusion. He was discharged on aspirin and Plavix, anticoagulation was discontinued. Course notable paroxysmal AF vs AT. He is now on metoprolol and Eliquis. Symptoms seem to have subsided. Will follow-up with EP in 1 year to discuss duration of AC pending repeat monitor results. Otherwise no cardiac symptoms today and ECHO shows well seated PFO closure device.      1. Switch Eliquis to Warfarin due to cost - INR goal 2-3  2. No APT while on AC  3. No longer requires SBE prophylaxis now that > 6 months out   4. Follow-up with me in 6 months with ECHO and labs prior    KALINA Machado, CNP  [facility] Cardiology Team

## 2023-12-11 ENCOUNTER — OFFICE VISIT (OUTPATIENT)
Dept: CARDIOLOGY | Facility: CLINIC | Age: 58
End: 2023-12-11
Attending: NURSE PRACTITIONER
Payer: COMMERCIAL

## 2023-12-11 ENCOUNTER — TELEPHONE (OUTPATIENT)
Dept: ANTICOAGULATION | Facility: CLINIC | Age: 58
End: 2023-12-11

## 2023-12-11 ENCOUNTER — ANTICOAGULATION THERAPY VISIT (OUTPATIENT)
Dept: ANTICOAGULATION | Facility: CLINIC | Age: 58
End: 2023-12-11

## 2023-12-11 VITALS
SYSTOLIC BLOOD PRESSURE: 131 MMHG | HEART RATE: 54 BPM | HEIGHT: 74 IN | DIASTOLIC BLOOD PRESSURE: 87 MMHG | WEIGHT: 285.8 LBS | BODY MASS INDEX: 36.68 KG/M2 | OXYGEN SATURATION: 98 %

## 2023-12-11 DIAGNOSIS — Q21.12 PFO (PATENT FORAMEN OVALE): ICD-10-CM

## 2023-12-11 DIAGNOSIS — I48.91 ATRIAL FIBRILLATION, UNSPECIFIED TYPE (H): ICD-10-CM

## 2023-12-11 DIAGNOSIS — I48.91 ATRIAL FIBRILLATION, UNSPECIFIED TYPE (H): Primary | ICD-10-CM

## 2023-12-11 DIAGNOSIS — I48.0 PAROXYSMAL ATRIAL FIBRILLATION (H): ICD-10-CM

## 2023-12-11 DIAGNOSIS — I48.0 PAROXYSMAL ATRIAL FIBRILLATION (H): Primary | ICD-10-CM

## 2023-12-11 DIAGNOSIS — Q21.12 PFO (PATENT FORAMEN OVALE): Primary | ICD-10-CM

## 2023-12-11 LAB — LVEF ECHO: NORMAL

## 2023-12-11 PROCEDURE — 99214 OFFICE O/P EST MOD 30 MIN: CPT | Mod: 25 | Performed by: NURSE PRACTITIONER

## 2023-12-11 PROCEDURE — G0463 HOSPITAL OUTPT CLINIC VISIT: HCPCS | Performed by: NURSE PRACTITIONER

## 2023-12-11 PROCEDURE — 93306 TTE W/DOPPLER COMPLETE: CPT | Performed by: INTERNAL MEDICINE

## 2023-12-11 RX ORDER — WARFARIN SODIUM 5 MG/1
TABLET ORAL
Qty: 90 TABLET | Refills: 1 | Status: SHIPPED | OUTPATIENT
Start: 2023-12-11 | End: 2024-07-16

## 2023-12-11 ASSESSMENT — PAIN SCALES - GENERAL: PAINLEVEL: NO PAIN (0)

## 2023-12-11 NOTE — TELEPHONE ENCOUNTER
Dr Vizcarra,    Your patient, Horacio Panda, is under the care of [facility]. Previously referred by cardiology.  Anticoagulation clinic needing a new referring provider due to If you would like to over see Inr or continue having cardiology manage.     Indication(s): Atrial Fibrillation   Goal Range: [unfilled]  Duration: 6 months     Anticoagulation clinic requires a referral from one of [facility] ambulatory provider (PCP or specialist) in order to provide anticoagulation management. The referring provider should anticipate seeing the patient on an ongoing basis, will have INRs and warfarin Rx ordered under their name per protocol, and will be point of contact for [facility] questions on patient's anticoagulation care (procedural holds, critical results, etc).     Please review and sign the pended referral order for Horacio Panda if you are willing to oversee anticoagulation care.         Thank you,  Shanna Victor, RN  Anticoagulation clinic

## 2023-12-11 NOTE — PATIENT INSTRUCTIONS
You were seen today in the Structural Heart Clinic at the [facility].    Cardiology provider you saw during your visit: Frances Pantoja NP    Your ECHO today is pending. I will contact you with results     Instructions:   Continue anticoagulation for atrial fibrillation - switch from Eliquis to warfarin  You no longer need antibiotics before the dentist now that 6 months post PFO closure   Follow-up in Valve Clinic in 6 months with echo prior     Questions and scheduling:   First call: Structural Heart  Lizbeth Rosales [phone]    General scheduling line: [phone].   First press #1 for the University and then press #3 for Medical Questions to reach the Cardiology triage nurse.     On Call Cardiologist for after hours or on weekends: [phone], press option #4 and ask to speak to the on-call Cardiologist.

## 2023-12-11 NOTE — LETTER
"12/11/2023      RE: Horacio Panda  [address] [facility] [address]       Dear Colleague,    Thank you for the opportunity to participate in the care of your patient, Horacio Panda, at the [facility] [place] [facility]. Please see a copy of my visit note below.        STRUCTURAL HEART CARE  CARDIOVASCULAR DIVISION  RETURN VISIT      HPI:     Hoarcio Panda 58 year old who presents for 6 month PFO closure follow-up. Patient has a history of HTN, HLD and acute CVA of the right posterior denise April 2023 at which time he was noted to have PFO by ESTELA. After discussion with stroke neurology empiric DOAC started given PFO and referred to structural cardiology for consideration of PFO closure. He was noted to have negative hypercoagulable work up and no AF on cardiac monitor thus underwent successful PFO with a 30 mm GORE cardioform on 6/16/23. Post procedure ECHO showed normal LV function without pericardial effusion. He was discharged on aspirin and Plavix, anticoagulation was discontinued. Post procedure developed palpitations. Cardiac monitor showed a-fib vs a-tach. Metoprolol and anticoagulation initiated.     Since his last visit he has been feeling alright. He is walking 30-40 minutes a day with no significant chest pain. He reports occasional shortness of breath which he attributes to being overweight and deconditioning. Heart fluttering and symptoms of feeling \"off\" have subsided. He has no lightheadedness, syncope, heart failure. Home /80.     He is tolerating Eliquis without bleeding concerns. Would like to switch over to warfarin due to cost.      PAST MEDICAL HISTORY:   Arthritis  CVA 5/2023  PFO s/p PFO closure 6/2023  Post procedure AF  HTN  HLD     PAST SURGICAL HISTORY:     Past Surgical History:   Procedure Laterality Date    CV PFO CLOSURE N/A 6/16/2023    Procedure: Heart Cath PFO Closure;  Surgeon: Gilbert Stone MD;  " "Location:  HEART CARDIAC CATH LAB    ZZC ARTHROPLASTY I-P JT  Age 26    Lft Hip      CURRENT MEDICATIONS:     Current Outpatient Medications   Medication    amLODIPine (NORVASC) 10 MG tablet    atorvastatin (LIPITOR) 80 MG tablet    losartan (COZAAR) 50 MG tablet    metoprolol tartrate (LOPRESSOR) 25 MG tablet    omeprazole (PRILOSEC) 20 MG DR capsule    warfarin ANTICOAGULANT (COUMADIN) 5 MG tablet    predniSONE (DELTASONE) 20 MG tablet     No current facility-administered medications for this visit.        ALLERGIES:      No Known Allergies     FAMILY HISTORY:     Family History   Problem Relation Age of Onset    Hypertension Mother     Cancer Father     Hypertension Sister     Cancer Maternal Grandfather     Cerebrovascular Disease Paternal Grandmother     Heart Disease Paternal Grandfather       SOCIAL HISTORY:     Social History     Tobacco Use    Smoking status: Never    Smokeless tobacco: Never   Substance Use Topics    Alcohol use: Yes     Comment: occ        REVIEW OF SYSTEMS:     Constitutional: No fevers or chills  Skin: No new rash or itching  Eyes: No acute change in vision  Ears/Nose/Throat: No purulent rhinorrhea, new hearing loss, or new vertigo  Respiratory: No cough or hemoptysis  Cardiovascular: See HPI  Gastrointestinal: No change in appetite, vomiting, hematemesis or diarrhea  Genitourinary: No dysuria or hematuria  Musculoskeletal: No new back pain, neck pain or muscle pain  Neurologic: No new headaches, focal weakness or behavior changes  Psychiatric: No hallucinations, excessive alcohol consumption or illegal drug usage  Hematologic/Lymphatic/Immunologic: No bleeding, chills, fever, night sweats or weight loss  Endocrine: No new cold intolerance, heat intolerance, polyphagia, polydipsia or polyuria      PHYSICAL EXAMINATION:     /87 (BP Location: Right arm, Patient Position: Sitting, Cuff Size: Adult Large)   Pulse 54   Ht 1.875 m (6' 1.82\")   Wt 129.6 kg (285 lb 12.8 oz)   SpO2 " 98%   BMI 36.87 kg/m      GENERAL: No acute distress.  HEENT: EOMI. Sclerae white, not injected. Nares clear. Pharynx without erythema or exudate.   Neck: No adenopathy. No thyromegaly. No jugular venous distension.   Heart: Regular rate and rhythm. No murmur.   Lungs: Clear to auscultation. No ronchi, wheezes, rales.   Abdomen: Soft, nontender, nondistended. Bowel sounds present.  Extremities: No clubbing, cyanosis, or edema.   Neurologic: Alert and oriented to person/place/time, normal speech and affect. No focal deficits.  Skin: No petechiae, purpura or rash.     LABORATORY DATA:     Admission on 06/16/2023, Discharged on 06/16/2023   Component Date Value Ref Range Status    Sodium 06/16/2023 141  136 - 145 mmol/L Final    Potassium 06/16/2023 4.4  3.4 - 5.3 mmol/L Final    Chloride 06/16/2023 105  98 - 107 mmol/L Final    Carbon Dioxide (CO2) 06/16/2023 24  22 - 29 mmol/L Final    Anion Gap 06/16/2023 12  7 - 15 mmol/L Final    Urea Nitrogen 06/16/2023 14.1  6.0 - 20.0 mg/dL Final    Creatinine 06/16/2023 1.09  0.67 - 1.17 mg/dL Final    Calcium 06/16/2023 9.2  8.6 - 10.0 mg/dL Final    Glucose 06/16/2023 106 (H)  70 - 99 mg/dL Final    GFR Estimate 06/16/2023 79  >60 mL/min/1.73m2 Final    eGFR calculated using 2021 CKD-EPI equation.    WBC Count 06/16/2023 6.2  4.0 - 11.0 10e3/uL Final    RBC Count 06/16/2023 4.61  4.40 - 5.90 10e6/uL Final    Hemoglobin 06/16/2023 15.6  13.3 - 17.7 g/dL Final    Hematocrit 06/16/2023 44.4  40.0 - 53.0 % Final    MCV 06/16/2023 96  78 - 100 fL Final    MCH 06/16/2023 33.8 (H)  26.5 - 33.0 pg Final    MCHC 06/16/2023 35.1  31.5 - 36.5 g/dL Final    RDW 06/16/2023 11.5  10.0 - 15.0 % Final    Platelet Count 06/16/2023 263  150 - 450 10e3/uL Final    Ventricular Rate 06/16/2023 69  BPM Final    Atrial Rate 06/16/2023 69  BPM Final    MD Interval 06/16/2023 186  ms Final    QRS Duration 06/16/2023 80  ms Final    QT 06/16/2023 376  ms Final    QTc 06/16/2023 402  ms Final    P  [place] 06/16/2023 12  degrees Final    R AXIS 06/16/2023 12  degrees Final    T [place] 06/16/2023 8  degrees Final    Interpretation ECG 06/16/2023    Final                    Value:Sinus rhythm  Normal ECG  When compared with ECG of 15-SMITA-2023 10:17, (unconfirmed)  No significant change was found  Confirmed by fellow Reyes Castro, Jorge ([id number]) on 6/19/2023 12:10:16 PM  Confirmed by MD BRAR JANE ([id number]) on 6/19/2023 2:03:28 PM      Activated Clotting Time (Celite) P* 06/16/2023 251 (H)  74 - 150 seconds Final    Activated Clotting Time (Celite) P* 06/16/2023 230 (H)  74 - 150 seconds Final    LVEF  06/16/2023 60-65%   Final        PROCEDURES & FURTHER ASSESSMENTS:   ECHO 12/11/23:  Interpretation Summary  An ASD closure device was seen in the expected anatomic position.The device  was well seated and without residual shunt by color Doppler mapping.  Ascending aorta 4.3 cm.Normal aortic index for BSA of 2.5m2.  No pericardial effusion is present.  Aortic size was not assessed in prior studies.  _________________________________      EKG 7/28/23  NSR HR 76 no arrhythmias     ECHO 7/28/23:  Interpretation Summary  Global and regional left ventricular function is normal with an EF of 55-60%.  Global right ventricular function is normal.  No significant valvular abnormalities present.  Post PFO closure with no shunt evident on color Doppler.  Small inferior vena cava size consistent with hypovolemia.  No pericardial effusion is present.  ______________________________________________________________________________  Left Ventricle  Left ventricular size is normal. Left ventricular wall thickness is normal.  Global and regional left ventricular function is normal with an EF of 55-60%.     Right Ventricle  The right ventricle is normal size. Global right ventricular function is  normal.     Atria  Both atria appear normal. s/p PFO closure with 30mm Cardioform septal occluder  device. Closure device not well seen in these  limited views.     Mitral Valve  The mitral valve is normal.     Aortic Valve  Aortic valve is normal in structure and function.     Tricuspid Valve  The tricuspid valve is normal. Trace tricuspid insufficiency is present. The  right ventricular systolic pressure is approximated at 15.9 mmHg plus the  right atrial pressure. Pulmonary artery systolic pressure is normal.     Pulmonic Valve  The pulmonic valve is normal.     Vessels  The aorta root is normal. Small inferior vena cava size consistent with  hypovolemia.     Pericardium  No pericardial effusion is present.     Miscellaneous  No significant valvular abnormalities present.     Compared to Previous Study  This study was compared with the study from 6.2023 . No significant changes  noted.    ECHO post procedure 6/16/23  Interpretation Summary  Global and regional left ventricular function is normal with an EF of 60-65%.  Global right ventricular function is normal.  s/p PFO closure with 30mm Cardioform septal occluder device. Closure device  not well seen in these limited views.  There was no shunt at the atrial septal level as assessed by color Doppler.  No pericardial effusion is present.     ______________________________________________________________________________  Left Ventricle  Left ventricular size is normal. Global and regional left ventricular function  is normal with an EF of 60-65%.     Right Ventricle  The right ventricle is normal size. Global right ventricular function is  normal.     Atria  There was no shunt at the atrial septal level as assessed by color Doppler.  s/p PFO closure with 30mm Cardioform septal occluder device.     Vessels  The inferior vena cava was normal in size with preserved respiratory  variability.     Pericardium  No pericardial effusion is present.     CLINICAL IMPRESSION:   Horacio Panda 58 year old who presents for 6 month PFO closure follow-up. Patient has a history of HTN, HLD and acute CVA of the right posterior  denise April 2023 at which time he was noted to have PFO by ESTELA. After discussion with stroke neurology empiric DOAC started given PFO and referred to structural cardiology for consideration of PFO closure. He underwent successful PFO with a 30 mm GORE cardioform on 6/16/23. Post procedure ECHO showed normal LV function without pericardial effusion. He was discharged on aspirin and Plavix, anticoagulation was discontinued. Course notable paroxysmal AF vs AT. He is now on metoprolol and Eliquis. Symptoms seem to have subsided. Will follow-up with EP in 1 year to discuss duration of AC pending repeat monitor results. Otherwise no cardiac symptoms today and ECHO shows well seated PFO closure device.      1. Switch Eliquis to Warfarin due to cost - INR goal 2-3  2. No APT while on AC  3. No longer requires SBE prophylaxis now that > 6 months out   4. Follow-up with me in 6 months with ECHO and labs prior    KALINA Machado, CNP  [facility] Cardiology Team      Please do not hesitate to contact me if you have any questions/concerns.

## 2023-12-11 NOTE — NURSING NOTE
Chief Complaint   Patient presents with    Follow Up     57 yo M, here for 6 month s/p PFO closure follow-up.       Vitals were taken, medications reviewed.    Christiana Oquendo, EMT   11:55 AM

## 2023-12-11 NOTE — PROGRESS NOTES
Left call back message per [facility] recommendation:     5mg daily, overlap with Eliquis x 3 days, INR Friday   (or INR Thursday if starts warfarin tonight if easier to get him in then) Per [facility] Alejandra.

## 2023-12-12 PROBLEM — I48.91 ATRIAL FIBRILLATION, UNSPECIFIED TYPE (H): Status: ACTIVE | Noted: 2023-12-12

## 2023-12-12 NOTE — PROGRESS NOTES
12/12/2023 Unable to reach patient left messages at number listed.to call to discuss transfer off elequis to warfarin.  Emeterio Avila Rn  [facility]

## 2023-12-13 NOTE — PROGRESS NOTES
Left message for patient to return call to discuss starting warfarin.    Sandra Curran RN  [facility]ticoagulation [facility]

## 2023-12-14 NOTE — PROGRESS NOTES
Patient returned call and he still has 27 days left of his Eliquis.  He would like to use this up prior to restarting warfarin.  He did start wafarin in the past but then was switched to Eliquis due to it being covered by his insurance at that time.  He has some knowledge of the medication and the education materials were mailed to the patient at that time.  He will look to see if he still has the materials and if not, we can mail a new packet out to him when we call him back to restart on the warfarin.  Will postpone message and call patient back one week before he runs out of the Eliquis.  We can discuss when to start warfarin and set up his lab appointments at that time.  Can also review the new patient education at that time also.  Patient states  understanding and is agreement with this plan.      Sandra Curran RN  [facility]

## 2023-12-14 NOTE — PROGRESS NOTES
Left message to call back to discuss starting warfarin.    Sandra Curran RN  [facility] Anticoagulation [facility]

## 2024-01-02 ENCOUNTER — MYC MEDICAL ADVICE (OUTPATIENT)
Dept: ANTICOAGULATION | Facility: CLINIC | Age: 59
End: 2024-01-02
Payer: COMMERCIAL

## 2024-01-04 NOTE — PROGRESS NOTES
See Mychart message. Pt's eliquis will be done on Wed. 1/10/24. He is suppose to overlap 3 days. Discussed this with him and he will start coumadin on 1/8/24 and we will check his first INR on 1/11/24. Pt denied any education questions and received all the material in the mail. Patient verbalized understanding and agrees with plan of care. Pt had no further questions or concerns at this time.     Bro Smith RN, BSN  [facility] Anticoagulation Team

## 2024-01-11 ENCOUNTER — ANTICOAGULATION THERAPY VISIT (OUTPATIENT)
Dept: ANTICOAGULATION | Facility: CLINIC | Age: 59
End: 2024-01-11

## 2024-01-11 ENCOUNTER — LAB (OUTPATIENT)
Dept: LAB | Facility: CLINIC | Age: 59
End: 2024-01-11
Payer: COMMERCIAL

## 2024-01-11 DIAGNOSIS — Q21.12 PFO (PATENT FORAMEN OVALE): ICD-10-CM

## 2024-01-11 DIAGNOSIS — I48.91 ATRIAL FIBRILLATION, UNSPECIFIED TYPE (H): ICD-10-CM

## 2024-01-11 DIAGNOSIS — I48.0 PAROXYSMAL ATRIAL FIBRILLATION (H): ICD-10-CM

## 2024-01-11 DIAGNOSIS — I48.0 PAROXYSMAL ATRIAL FIBRILLATION (H): Primary | ICD-10-CM

## 2024-01-11 LAB — INR PPP: 1.36 (ref 0.85–1.15)

## 2024-01-11 PROCEDURE — 85610 PROTHROMBIN TIME: CPT

## 2024-01-11 PROCEDURE — 36415 COLL VENOUS BLD VENIPUNCTURE: CPT

## 2024-01-11 NOTE — PROGRESS NOTES
ANTICOAGULATION MANAGEMENT     Horacio Panda 58 year old male is on warfarin with subtherapeutic INR result. (Goal INR 2.0-3.0)    Recent labs: (last 7 days)     01/11/24  0800   INR 1.36*       ASSESSMENT     Warfarin Lab Questionnaire    Warfarin Doses Last 7 Days      1/10/2024     5:03 PM   Dose in Tablet or Mg   TAB or MG? milligram (mg)     Pt Rptd Dose JR MONDAY TUESDAY WED THURS FRIDAY SATURDAY   1/10/2024   5:03 PM 0 5 5 5 5 0 0         1/10/2024   Warfarin Lab Questionnaire   Missed doses within past 14 days? No   Changes in diet or alcohol within past 14 days? No   Medication changes since last result? No   Injuries or illness since last result? No   New shortness of breath, severe headaches or sudden changes in vision since last result? No   Abnormal bleeding since last result? No   Upcoming surgery, procedure? No     Previous result: Subtherapeutic  Additional findings: New start on day 4 of warfarin and I have ordered standing order INR POC       PLAN     Recommended plan for no diet, medication or health factor changes affecting INR     Dosing Instructions: Increase your warfarin dose (14.3% change) with next INR in 5 days   (I advised an appt on Tues but pt states he will be out of town, so he will come in Wed)     Summary  As of 1/11/2024      Full warfarin instructions:  7.5 mg every Mon, Thu; 5 mg all other days   Next INR check:  1/17/2024               Telephone call with Horacio who verbalizes understanding and agrees to plan    Lab visit scheduled    Education provided:   Please call back if any changes to your diet, medications or how you've been taking warfarin  Goal range and lab monitoring: goal range and significance of current result, Importance of therapeutic range, and Importance of following up at instructed interval  Symptom monitoring: monitoring for clotting signs and symptoms, monitoring for stroke signs and symptoms, and when to seek medical attention/emergency care    Plan made  per [facility] anticoagulation protocol    Tatianna Dwyer, RN  Anticoagulation Clinic  1/11/2024    _______________________________________________________________________     Anticoagulation Episode Summary       Current INR goal:  2.0-3.0   TTR:  --   Target end date:  Indefinite   Send INR reminders to:  [facility]    Indications    Paroxysmal atrial fibrillation (H) [I48.0]  PFO (patent foramen ovale) [Q21.12]  Atrial fibrillation  unspecified type (H) [I48.91]             Comments:               Anticoagulation Care Providers       Provider Role Specialty Phone number    Frances Pantoja NP Referring Cardiovascular Disease [phone]    Олег Vizcarra PA-C Referring Family Medicine [phone]

## 2024-01-17 ENCOUNTER — ANTICOAGULATION THERAPY VISIT (OUTPATIENT)
Dept: ANTICOAGULATION | Facility: CLINIC | Age: 59
End: 2024-01-17

## 2024-01-17 ENCOUNTER — LAB (OUTPATIENT)
Dept: LAB | Facility: CLINIC | Age: 59
End: 2024-01-17
Payer: COMMERCIAL

## 2024-01-17 DIAGNOSIS — I48.0 PAROXYSMAL ATRIAL FIBRILLATION (H): ICD-10-CM

## 2024-01-17 DIAGNOSIS — Q21.12 PFO (PATENT FORAMEN OVALE): ICD-10-CM

## 2024-01-17 DIAGNOSIS — I48.91 ATRIAL FIBRILLATION, UNSPECIFIED TYPE (H): ICD-10-CM

## 2024-01-17 DIAGNOSIS — I48.0 PAROXYSMAL ATRIAL FIBRILLATION (H): Primary | ICD-10-CM

## 2024-01-17 LAB — INR PPP: 1.44 (ref 0.85–1.15)

## 2024-01-17 PROCEDURE — 85610 PROTHROMBIN TIME: CPT

## 2024-01-17 PROCEDURE — 36415 COLL VENOUS BLD VENIPUNCTURE: CPT

## 2024-01-17 NOTE — PROGRESS NOTES
ANTICOAGULATION MANAGEMENT     Horacio Panda 58 year old male is on warfarin with subtherapeutic INR result. (Goal INR 2.0-3.0)    Recent labs: (last 7 days)     01/17/24  0816   INR 1.44*       ASSESSMENT     Source(s): Chart Review and Patient/Caregiver Call     Warfarin doses taken: Warfarin taken as instructed  Diet: No new diet changes identified  Medication/supplement changes: None noted  New illness, injury, or hospitalization: No  Signs or symptoms of bleeding or clotting: No  Previous result: Subtherapeutic  Additional findings: New start on day 10 of warfarin       PLAN     Recommended plan for no diet, medication or health factor changes affecting INR     Dosing Instructions: Increase your warfarin dose (18.8% change) with next INR in 6 days       Summary  As of 1/17/2024      Full warfarin instructions:  5 mg every Mon, Fri; 7.5 mg all other days   Next INR check:  1/23/2024               Telephone call with Horacio who verbalizes understanding and agrees to plan    Lab visit scheduled    Education provided:   Contact [phone]  with any changes, questions or concerns.     Plan made with [facility] Pharmacist Alejandra Boyd, TERI  Anticoagulation Clinic  1/17/2024    _______________________________________________________________________     Anticoagulation Episode Summary       Current INR goal:  2.0-3.0   TTR:  0.0% (6 d)   Target end date:  Indefinite   Send INR reminders to:  [facility]    Indications    Paroxysmal atrial fibrillation (H) [I48.0]  PFO (patent foramen ovale) [Q21.12]  Atrial fibrillation  unspecified type (H) [I48.91]             Comments:               Anticoagulation Care Providers       Provider Role Specialty Phone number    Frances Pantoja NP Referring Cardiovascular Disease [phone]    Oppel, Олег Akbar PA-C Referring Family Medicine [phone]             Directly informed patient of pre op arrival time 36 on 10/27. All questions answered. Pre op instructions reviewed. Left contact information for any additional questions or needs.

## 2024-01-23 ENCOUNTER — ANTICOAGULATION THERAPY VISIT (OUTPATIENT)
Dept: ANTICOAGULATION | Facility: CLINIC | Age: 59
End: 2024-01-23

## 2024-01-23 ENCOUNTER — LAB (OUTPATIENT)
Dept: LAB | Facility: CLINIC | Age: 59
End: 2024-01-23
Payer: COMMERCIAL

## 2024-01-23 DIAGNOSIS — I48.91 ATRIAL FIBRILLATION, UNSPECIFIED TYPE (H): ICD-10-CM

## 2024-01-23 DIAGNOSIS — I48.0 PAROXYSMAL ATRIAL FIBRILLATION (H): Primary | ICD-10-CM

## 2024-01-23 DIAGNOSIS — I48.0 PAROXYSMAL ATRIAL FIBRILLATION (H): ICD-10-CM

## 2024-01-23 DIAGNOSIS — Q21.12 PFO (PATENT FORAMEN OVALE): ICD-10-CM

## 2024-01-23 LAB — INR PPP: 1.46 (ref 0.85–1.15)

## 2024-01-23 PROCEDURE — 36415 COLL VENOUS BLD VENIPUNCTURE: CPT

## 2024-01-23 PROCEDURE — 85610 PROTHROMBIN TIME: CPT

## 2024-01-23 NOTE — PROGRESS NOTES
ANTICOAGULATION MANAGEMENT     Horacio Panda 58 year old male is on warfarin with subtherapeutic INR result. (Goal INR 2.0-3.0)    Recent labs: (last 7 days)     01/23/24  0831   INR 1.46*       ASSESSMENT     Source(s): Chart Review and Patient/Caregiver Call     Warfarin doses taken: Warfarin taken as instructed  Diet: No new diet changes identified  Medication/supplement changes: None noted  New illness, injury, or hospitalization: No  Signs or symptoms of bleeding or clotting: No  Previous result: Subtherapeutic  Additional findings: New start on day 16 of warfarin       PLAN     Recommended plan for no diet, medication or health factor changes affecting INR     Dosing Instructions: Increase your warfarin dose (21% change) with next INR in 1 week       Summary  As of 1/23/2024      Full warfarin instructions:  10 mg every Tue, Sat; 7.5 mg all other days   Next INR check:  1/30/2024               Telephone call with Horacio who verbalizes understanding and agrees to plan    Lab visit scheduled    Education provided:   Goal range and lab monitoring: goal range and significance of current result  Contact [phone]  with any changes, questions or concerns.     Plan made with [facility] Pharmacist Alejandra Curran, RN  Anticoagulation Clinic  1/23/2024    _______________________________________________________________________     Anticoagulation Episode Summary       Current INR goal:  2.0-3.0   TTR:  0.0% (1.7 wk)   Target end date:  Indefinite   Send INR reminders to:  [facility]    Indications    Paroxysmal atrial fibrillation (H) [I48.0]  PFO (patent foramen ovale) [Q21.12]  Atrial fibrillation  unspecified type (H) [I48.91]             Comments:               Anticoagulation Care Providers       Provider Role Specialty Phone number    Frances Pantoja NP Referring Cardiovascular Disease [phone]    Oppel, Олег Akbar PA-C Referring Family Medicine [phone]

## 2024-01-29 ENCOUNTER — TELEPHONE (OUTPATIENT)
Dept: FAMILY MEDICINE | Facility: CLINIC | Age: 59
End: 2024-01-29
Payer: COMMERCIAL

## 2024-01-29 NOTE — TELEPHONE ENCOUNTER
Lab appt scheduled tomorrow. Patient verbalized understanding and agrees with plan of care. Pt had no further questions or concerns at this time.     Bro Smith RN, BSN  [facility] Anticoagulation Team

## 2024-01-29 NOTE — TELEPHONE ENCOUNTER
Patient Returning Call    Reason for call:  INR Please call pt - confused about appointments and need for a visit tomorrow    Information relayed to patient:  INR will return call    Patient has additional questions:  Yes    What are your questions/concerns:  above    Who does the patient want to speak with:      Is an  needed?:  No      Could we send this information to you in [employer] or would you prefer to receive a phone call?:   Patient would prefer a phone call   Okay to leave a detailed message?: Yes at Cell number on file:    Telephone Information:   Mobile [phone]

## 2024-01-30 ENCOUNTER — ANTICOAGULATION THERAPY VISIT (OUTPATIENT)
Dept: ANTICOAGULATION | Facility: CLINIC | Age: 59
End: 2024-01-30

## 2024-01-30 ENCOUNTER — LAB (OUTPATIENT)
Dept: LAB | Facility: CLINIC | Age: 59
End: 2024-01-30
Payer: COMMERCIAL

## 2024-01-30 DIAGNOSIS — I48.0 PAROXYSMAL ATRIAL FIBRILLATION (H): ICD-10-CM

## 2024-01-30 DIAGNOSIS — I48.91 ATRIAL FIBRILLATION, UNSPECIFIED TYPE (H): ICD-10-CM

## 2024-01-30 DIAGNOSIS — Q21.12 PFO (PATENT FORAMEN OVALE): ICD-10-CM

## 2024-01-30 DIAGNOSIS — I48.0 PAROXYSMAL ATRIAL FIBRILLATION (H): Primary | ICD-10-CM

## 2024-01-30 LAB — INR PPP: 1.67 (ref 0.85–1.15)

## 2024-01-30 PROCEDURE — 85610 PROTHROMBIN TIME: CPT

## 2024-01-30 PROCEDURE — 36415 COLL VENOUS BLD VENIPUNCTURE: CPT

## 2024-01-30 NOTE — PROGRESS NOTES
ANTICOAGULATION MANAGEMENT     Horacio Panda 58 year old male is on warfarin with subtherapeutic INR result. (Goal INR 2.0-3.0)    Recent labs: (last 7 days)     01/30/24  0902   INR 1.67*       ASSESSMENT     Source(s): Chart Review and Patient/Caregiver Call     Warfarin doses taken: Warfarin taken as instructed  Diet: No new diet changes identified  Medication/supplement changes: None noted  New illness, injury, or hospitalization: No  Signs or symptoms of bleeding or clotting: No  Previous result: Subtherapeutic  Additional findings: New start on 12/11/23.       PLAN     Recommended plan for no diet, medication or health factor changes affecting INR     Dosing Instructions: Increase your warfarin dose (13% change) with next INR in 1 week       Summary  As of 1/30/2024      Full warfarin instructions:  7.5 mg every Mon, Fri; 10 mg all other days   Next INR check:  2/8/2024               Telephone call with Horacio who verbalizes understanding and agrees to plan    Lab visit scheduled    Education provided:   Symptom monitoring: monitoring for bleeding signs and symptoms  Contact [phone]  with any changes, questions or concerns.     Plan made per [facility] anticoagulation protocol    Jolly VALENCIA RN  Anticoagulation Clinic  1/30/2024    _______________________________________________________________________     Anticoagulation Episode Summary       Current INR goal:  2.0-3.0   TTR:  0.0% (2.7 wk)   Target end date:  Indefinite   Send INR reminders to:  [facility]    Indications    Paroxysmal atrial fibrillation (H) [I48.0]  PFO (patent foramen ovale) [Q21.12]  Atrial fibrillation  unspecified type (H) [I48.91]             Comments:               Anticoagulation Care Providers       Provider Role Specialty Phone number    Frances Pantoja NP Referring Cardiovascular Disease [phone]    Oppel, Олег Akbar PA-C Referring Family Medicine [phone]

## 2024-02-05 DIAGNOSIS — E78.00 HIGH CHOLESTEROL: ICD-10-CM

## 2024-02-06 RX ORDER — ATORVASTATIN CALCIUM 80 MG/1
80 TABLET, FILM COATED ORAL DAILY
Qty: 90 TABLET | Refills: 0 | Status: SHIPPED | OUTPATIENT
Start: 2024-02-06 | End: 2024-05-08

## 2024-02-08 ENCOUNTER — ANTICOAGULATION THERAPY VISIT (OUTPATIENT)
Dept: ANTICOAGULATION | Facility: CLINIC | Age: 59
End: 2024-02-08

## 2024-02-08 ENCOUNTER — LAB (OUTPATIENT)
Dept: LAB | Facility: CLINIC | Age: 59
End: 2024-02-08
Payer: COMMERCIAL

## 2024-02-08 DIAGNOSIS — I48.0 PAROXYSMAL ATRIAL FIBRILLATION (H): ICD-10-CM

## 2024-02-08 DIAGNOSIS — I48.91 ATRIAL FIBRILLATION, UNSPECIFIED TYPE (H): ICD-10-CM

## 2024-02-08 DIAGNOSIS — Q21.12 PFO (PATENT FORAMEN OVALE): ICD-10-CM

## 2024-02-08 DIAGNOSIS — I48.0 PAROXYSMAL ATRIAL FIBRILLATION (H): Primary | ICD-10-CM

## 2024-02-08 LAB — INR PPP: 2.39 (ref 0.85–1.15)

## 2024-02-08 PROCEDURE — 85610 PROTHROMBIN TIME: CPT

## 2024-02-08 PROCEDURE — 36415 COLL VENOUS BLD VENIPUNCTURE: CPT

## 2024-02-08 NOTE — PROGRESS NOTES
ANTICOAGULATION MANAGEMENT     Horacio Panda 58 year old male is on warfarin with therapeutic INR result. (Goal INR 2.0-3.0)    Recent labs: (last 7 days)     02/08/24  0752   INR 2.39*       ASSESSMENT     Source(s): Chart Review and Patient/Caregiver Call     Warfarin doses taken: Warfarin taken as instructed  Diet: No new diet changes identified  Medication/supplement changes: None noted  New illness, injury, or hospitalization: No  Signs or symptoms of bleeding or clotting: No  Previous result: Subtherapeutic  Additional findings:  new start (Dec)       PLAN     Recommended plan for no diet, medication or health factor changes affecting INR     Dosing Instructions: Continue your current warfarin dose with next INR in 1 week       Summary  As of 2/8/2024      Full warfarin instructions:  7.5 mg every Mon, Fri; 10 mg all other days   Next INR check:  2/15/2024               Telephone call with Horacio who verbalizes understanding and agrees to plan and who agrees to plan and repeated back plan correctly    Lab visit scheduled    Education provided:   None required    Plan made per [facility] anticoagulation protocol    Kelin Gipson, RN  Anticoagulation Clinic  2/8/2024    _______________________________________________________________________     Anticoagulation Episode Summary       Current INR goal:  2.0-3.0   TTR:  17.3% (3.9 wk)   Target end date:  Indefinite   Send INR reminders to:  [facility]    Indications    Paroxysmal atrial fibrillation (H) [I48.0]  PFO (patent foramen ovale) [Q21.12]  Atrial fibrillation  unspecified type (H) [I48.91]             Comments:               Anticoagulation Care Providers       Provider Role Specialty Phone number    Frances Pantoja NP Referring Cardiovascular Disease [phone]    OppeОлег ley PA-C Referring Family Medicine [phone]

## 2024-02-15 ENCOUNTER — ANTICOAGULATION THERAPY VISIT (OUTPATIENT)
Dept: ANTICOAGULATION | Facility: CLINIC | Age: 59
End: 2024-02-15

## 2024-02-15 ENCOUNTER — LAB (OUTPATIENT)
Dept: LAB | Facility: CLINIC | Age: 59
End: 2024-02-15
Payer: COMMERCIAL

## 2024-02-15 DIAGNOSIS — I48.0 PAROXYSMAL ATRIAL FIBRILLATION (H): Primary | ICD-10-CM

## 2024-02-15 DIAGNOSIS — I48.91 ATRIAL FIBRILLATION, UNSPECIFIED TYPE (H): ICD-10-CM

## 2024-02-15 DIAGNOSIS — Q21.12 PFO (PATENT FORAMEN OVALE): ICD-10-CM

## 2024-02-15 DIAGNOSIS — I48.0 PAROXYSMAL ATRIAL FIBRILLATION (H): ICD-10-CM

## 2024-02-15 LAB — INR PPP: 2.28 (ref 0.85–1.15)

## 2024-02-15 PROCEDURE — 85610 PROTHROMBIN TIME: CPT

## 2024-02-15 PROCEDURE — 36415 COLL VENOUS BLD VENIPUNCTURE: CPT

## 2024-02-15 NOTE — PROGRESS NOTES
ANTICOAGULATION MANAGEMENT     Horacio Panda 58 year old male is on warfarin with therapeutic INR result. (Goal INR 2.0-3.0)    Recent labs: (last 7 days)     02/15/24  0928   INR 2.28*       ASSESSMENT     Source(s): Chart Review  Previous INR was Therapeutic last visit; previously outside of goal range  Medication, diet, health changes since last INR chart reviewed; none identified         PLAN     Recommended plan for no diet, medication or health factor changes affecting INR     Dosing Instructions: Continue your current warfarin dose with next INR in 2 weeks       Summary  As of 2/15/2024      Full warfarin instructions:  7.5 mg every Mon, Fri; 10 mg all other days   Next INR check:  2/29/2024               Detailed voice message left for Horacio with dosing instructions and follow up date.     Lab visit scheduled    Education provided:   Contact [phone]  with any changes, questions or concerns.     Plan made per [facility] anticoagulation protocol    Sandra Curran RN  Anticoagulation Clinic  2/15/2024    _______________________________________________________________________     Anticoagulation Episode Summary       Current INR goal:  2.0-3.0   TTR:  34.0% (1.2 mo)   Target end date:  Indefinite   Send INR reminders to:  [facility]    Indications    Paroxysmal atrial fibrillation (H) [I48.0]  PFO (patent foramen ovale) [Q21.12]  Atrial fibrillation  unspecified type (H) [I48.91]             Comments:               Anticoagulation Care Providers       Provider Role Specialty Phone number    Frances Pantoja NP Referring Cardiovascular Disease [phone]    Oppel, Олег Akbar PA-C Referring Family Medicine [phone]

## 2024-02-29 ENCOUNTER — LAB (OUTPATIENT)
Dept: LAB | Facility: CLINIC | Age: 59
End: 2024-02-29
Payer: COMMERCIAL

## 2024-02-29 ENCOUNTER — ANTICOAGULATION THERAPY VISIT (OUTPATIENT)
Dept: ANTICOAGULATION | Facility: CLINIC | Age: 59
End: 2024-02-29

## 2024-02-29 DIAGNOSIS — Q21.12 PFO (PATENT FORAMEN OVALE): ICD-10-CM

## 2024-02-29 DIAGNOSIS — I48.91 ATRIAL FIBRILLATION, UNSPECIFIED TYPE (H): ICD-10-CM

## 2024-02-29 DIAGNOSIS — I48.0 PAROXYSMAL ATRIAL FIBRILLATION (H): Primary | ICD-10-CM

## 2024-02-29 DIAGNOSIS — I48.0 PAROXYSMAL ATRIAL FIBRILLATION (H): ICD-10-CM

## 2024-02-29 LAB — INR PPP: 2.5 (ref 0.85–1.15)

## 2024-02-29 PROCEDURE — 36415 COLL VENOUS BLD VENIPUNCTURE: CPT

## 2024-02-29 PROCEDURE — 85610 PROTHROMBIN TIME: CPT

## 2024-02-29 RX ORDER — WARFARIN SODIUM 5 MG/1
TABLET ORAL
Qty: 180 TABLET | Refills: 1 | Status: SHIPPED | OUTPATIENT
Start: 2024-02-29 | End: 2024-09-04

## 2024-02-29 NOTE — PROGRESS NOTES
ANTICOAGULATION MANAGEMENT     Horacio Panda 58 year old male is on warfarin with therapeutic INR result. (Goal INR 2.0-3.0)    Recent labs: (last 7 days)     02/29/24  0800   INR 2.50*       ASSESSMENT     Source(s): Chart Review  Previous INR was Therapeutic last 2(+) visits  Medication, diet, health changes since last INR chart reviewed; none identified - pt is scheduled for labs on 3/5/24 but I did advise him in my voicemail that this check does not need to be so soon and that he can call back to reschedule closer to 3/14/24         PLAN     Recommended plan for no diet, medication or health factor changes affecting INR     Dosing Instructions: Continue your current warfarin dose with next INR in 2 weeks       Summary  As of 2/29/2024      Full warfarin instructions:  7.5 mg every Mon, Fri; 10 mg all other days   Next INR check:  3/14/2024               Detailed voice message left for Horacio with dosing instructions and follow up date.     Contact [phone]  to schedule and with any changes, questions or concerns.     Education provided:   Please call back if any changes to your diet, medications or how you've been taking warfarin    Plan made per [facility] anticoagulation protocol    Bro Smith, RN  Anticoagulation Clinic  2/29/2024    _______________________________________________________________________     Anticoagulation Episode Summary       Current INR goal:  2.0-3.0   TTR:  52.8% (1.6 mo)   Target end date:  Indefinite   Send INR reminders to:  [facility]    Indications    Paroxysmal atrial fibrillation (H) [I48.0]  PFO (patent foramen ovale) [Q21.12]  Atrial fibrillation  unspecified type (H) [I48.91]             Comments:               Anticoagulation Care Providers       Provider Role Specialty Phone number    Frances Pantoja NP Referring Cardiovascular Disease [phone]    Oppejil, Олег Akbar PA-C Referring Family Medicine [phone]

## 2024-03-10 DIAGNOSIS — I10 ESSENTIAL HYPERTENSION WITH GOAL BLOOD PRESSURE LESS THAN 140/90: ICD-10-CM

## 2024-03-10 NOTE — LETTER
March 12, 2024    Horacio Panda  [id number] [id number]    Dear Horacio,       We recently received a refill request for losartan (COZAAR) 50 MG tablet and amLODIPine (NORVASC) 10 MG tablet.  We have refilled this for a one time 90 day supply only because you are due for a:    Medication Check office visit      Please call at your earliest convenience so that there will not be a delay with your future refills.          Thank you,   Your [facility] Team/AL  [phone]

## 2024-03-11 RX ORDER — LOSARTAN POTASSIUM 50 MG/1
50 TABLET ORAL DAILY
Qty: 90 TABLET | Refills: 0 | Status: SHIPPED | OUTPATIENT
Start: 2024-03-11 | End: 2024-06-06

## 2024-03-12 ENCOUNTER — MYC MEDICAL ADVICE (OUTPATIENT)
Dept: FAMILY MEDICINE | Facility: CLINIC | Age: 59
End: 2024-03-12
Payer: COMMERCIAL

## 2024-03-12 RX ORDER — AMLODIPINE BESYLATE 10 MG/1
10 TABLET ORAL DAILY
Qty: 90 TABLET | Refills: 0 | Status: SHIPPED | OUTPATIENT
Start: 2024-03-12 | End: 2024-06-17

## 2024-03-14 ENCOUNTER — ANTICOAGULATION THERAPY VISIT (OUTPATIENT)
Dept: ANTICOAGULATION | Facility: CLINIC | Age: 59
End: 2024-03-14

## 2024-03-14 ENCOUNTER — LAB (OUTPATIENT)
Dept: LAB | Facility: CLINIC | Age: 59
End: 2024-03-14
Payer: COMMERCIAL

## 2024-03-14 DIAGNOSIS — I48.0 PAROXYSMAL ATRIAL FIBRILLATION (H): Primary | ICD-10-CM

## 2024-03-14 DIAGNOSIS — I48.91 ATRIAL FIBRILLATION, UNSPECIFIED TYPE (H): ICD-10-CM

## 2024-03-14 DIAGNOSIS — Q21.12 PFO (PATENT FORAMEN OVALE): ICD-10-CM

## 2024-03-14 DIAGNOSIS — I48.0 PAROXYSMAL ATRIAL FIBRILLATION (H): ICD-10-CM

## 2024-03-14 LAB — INR PPP: 2.44 (ref 0.85–1.15)

## 2024-03-14 PROCEDURE — 85610 PROTHROMBIN TIME: CPT

## 2024-03-14 PROCEDURE — 36415 COLL VENOUS BLD VENIPUNCTURE: CPT

## 2024-04-04 ENCOUNTER — LAB (OUTPATIENT)
Dept: LAB | Facility: CLINIC | Age: 59
End: 2024-04-04
Payer: COMMERCIAL

## 2024-04-04 DIAGNOSIS — I48.0 PAROXYSMAL ATRIAL FIBRILLATION (H): ICD-10-CM

## 2024-04-04 DIAGNOSIS — Q21.12 PFO (PATENT FORAMEN OVALE): ICD-10-CM

## 2024-04-04 LAB — INR PPP: 2.49 (ref 0.85–1.15)

## 2024-04-04 PROCEDURE — 85610 PROTHROMBIN TIME: CPT

## 2024-04-04 PROCEDURE — 36415 COLL VENOUS BLD VENIPUNCTURE: CPT

## 2024-04-05 ENCOUNTER — ANTICOAGULATION THERAPY VISIT (OUTPATIENT)
Dept: ANTICOAGULATION | Facility: CLINIC | Age: 59
End: 2024-04-05
Payer: COMMERCIAL

## 2024-04-05 DIAGNOSIS — I48.91 ATRIAL FIBRILLATION, UNSPECIFIED TYPE (H): ICD-10-CM

## 2024-04-05 DIAGNOSIS — Q21.12 PFO (PATENT FORAMEN OVALE): ICD-10-CM

## 2024-04-05 DIAGNOSIS — I48.0 PAROXYSMAL ATRIAL FIBRILLATION (H): Primary | ICD-10-CM

## 2024-04-05 NOTE — PROGRESS NOTES
ANTICOAGULATION MANAGEMENT     Horacio Panda 58 year old male is on warfarin with therapeutic INR result. (Goal INR 2.0-3.0)    Recent labs: (last 7 days)     04/04/24  1046   INR 2.49*       ASSESSMENT     Warfarin Lab Questionnaire    Warfarin Doses Last 7 Days          4/3/2024   Warfarin Lab Questionnaire   Missed doses within past 14 days? No   Changes in diet or alcohol within past 14 days? No   Medication changes since last result? No   Injuries or illness since last result? No   New shortness of breath, severe headaches or sudden changes in vision since last result? Yes   If yes, please explain:  Shortness of breath mild headachs sometimes my eyes hurt   Abnormal bleeding since last result? No   Upcoming surgery, procedure? No     Previous result: Therapeutic last 2(+) visits  Additional findings:  Patient states he does get sob with activity. It is not alarming to him at this time. If it does worsen he will follow-up with his PCP or cardiology. The mild headaches he believes are attributed to allergies and/or sinus conditions.       PLAN     Recommended plan for temporary change(s) affecting INR     Dosing Instructions: Continue your current warfarin dose with next INR in 4 weeks       Summary  As of 4/5/2024      Full warfarin instructions:  7.5 mg every Mon, Fri; 10 mg all other days   Next INR check:  5/2/2024               Telephone call with Horacio who verbalizes understanding and agrees to plan    Lab visit scheduled    Education provided:   Importance of notifying anticoagulation clinic for: changes in medications; a sooner lab recheck maybe needed and diarrhea, nausea/vomiting, reduced intake, cold/flu, and/or infections; a sooner lab recheck maybe needed    Plan made per [facility] anticoagulation protocol    Jolly VALENCIA RN  Anticoagulation Clinic  4/5/2024    _______________________________________________________________________     Anticoagulation Episode Summary       Current INR goal:  2.0-3.0   TTR:   72.5% (2.8 mo)   Target end date:  Indefinite   Send INR reminders to:  [facility]    Indications    Paroxysmal atrial fibrillation (H) [I48.0]  PFO (patent foramen ovale) [Q21.12]  Atrial fibrillation  unspecified type (H) [I48.91]             Comments:               Anticoagulation Care Providers       Provider Role Specialty Phone number    Frances Pantoja NP Referring Cardiovascular Disease [phone]    OppelОлег PA-C Referring Family Medicine [phone]

## 2024-05-01 ENCOUNTER — ANTICOAGULATION THERAPY VISIT (OUTPATIENT)
Dept: ANTICOAGULATION | Facility: CLINIC | Age: 59
End: 2024-05-01

## 2024-05-01 ENCOUNTER — LAB (OUTPATIENT)
Dept: LAB | Facility: CLINIC | Age: 59
End: 2024-05-01
Payer: COMMERCIAL

## 2024-05-01 DIAGNOSIS — Q21.12 PFO (PATENT FORAMEN OVALE): ICD-10-CM

## 2024-05-01 DIAGNOSIS — I48.0 PAROXYSMAL ATRIAL FIBRILLATION (H): ICD-10-CM

## 2024-05-01 DIAGNOSIS — I48.0 PAROXYSMAL ATRIAL FIBRILLATION (H): Primary | ICD-10-CM

## 2024-05-01 DIAGNOSIS — I48.91 ATRIAL FIBRILLATION, UNSPECIFIED TYPE (H): ICD-10-CM

## 2024-05-01 LAB — INR PPP: 2.8 (ref 0.85–1.15)

## 2024-05-01 PROCEDURE — 36415 COLL VENOUS BLD VENIPUNCTURE: CPT

## 2024-05-01 PROCEDURE — 85610 PROTHROMBIN TIME: CPT

## 2024-05-01 NOTE — PROGRESS NOTES
ANTICOAGULATION MANAGEMENT     Horacio Panda 58 year old male is on warfarin with therapeutic INR result. (Goal INR 2.0-3.0)    Recent labs: (last 7 days)     05/01/24  0941   INR 2.80*       ASSESSMENT     Source(s): Chart Review and Patient/Caregiver Call     Warfarin doses taken: Warfarin taken as instructed  Diet: No new diet changes identified  Medication/supplement changes: None noted  New illness, injury, or hospitalization: No  Signs or symptoms of bleeding or clotting: No  Previous result: Therapeutic last 2(+) visits  Additional findings: None       PLAN     Recommended plan for no diet, medication or health factor changes affecting INR     Dosing Instructions: Continue your current warfarin dose with next INR in 4 weeks       Summary  As of 5/1/2024      Full warfarin instructions:  7.5 mg every Mon, Fri; 10 mg all other days   Next INR check:  5/29/2024               Telephone call with Horacio who verbalizes understanding and agrees to plan and who agrees to plan and repeated back plan correctly    Lab visit scheduled    Education provided:   None required    Plan made per [facility] anticoagulation protocol    Kelin Gipson, RN  Anticoagulation Clinic  5/1/2024    _______________________________________________________________________     Anticoagulation Episode Summary       Current INR goal:  2.0-3.0   TTR:  79.2% (3.7 mo)   Target end date:  Indefinite   Send INR reminders to:  [facility]    Indications    Paroxysmal atrial fibrillation (H) [I48.0]  PFO (patent foramen ovale) [Q21.12]  Atrial fibrillation  unspecified type (H) [I48.91]             Comments:               Anticoagulation Care Providers       Provider Role Specialty Phone number    Frances Pantoja NP Referring Cardiovascular Disease [phone]    OppelОлег PA-C Referring Family Medicine [phone]

## 2024-05-28 DIAGNOSIS — I47.19 ATRIAL TACHYCARDIA, PAROXYSMAL (H): Primary | ICD-10-CM

## 2024-06-04 DIAGNOSIS — I10 ESSENTIAL HYPERTENSION WITH GOAL BLOOD PRESSURE LESS THAN 140/90: ICD-10-CM

## 2024-06-04 DIAGNOSIS — K21.9 GASTROESOPHAGEAL REFLUX DISEASE WITHOUT ESOPHAGITIS: ICD-10-CM

## 2024-06-05 ENCOUNTER — ANTICOAGULATION THERAPY VISIT (OUTPATIENT)
Dept: ANTICOAGULATION | Facility: CLINIC | Age: 59
End: 2024-06-05

## 2024-06-05 ENCOUNTER — LAB (OUTPATIENT)
Dept: LAB | Facility: CLINIC | Age: 59
End: 2024-06-05
Payer: COMMERCIAL

## 2024-06-05 DIAGNOSIS — I48.0 PAROXYSMAL ATRIAL FIBRILLATION (H): ICD-10-CM

## 2024-06-05 DIAGNOSIS — Q21.12 PFO (PATENT FORAMEN OVALE): ICD-10-CM

## 2024-06-05 DIAGNOSIS — I48.91 ATRIAL FIBRILLATION, UNSPECIFIED TYPE (H): ICD-10-CM

## 2024-06-05 DIAGNOSIS — I48.0 PAROXYSMAL ATRIAL FIBRILLATION (H): Primary | ICD-10-CM

## 2024-06-05 LAB — INR PPP: 2.37 (ref 0.85–1.15)

## 2024-06-05 PROCEDURE — 36415 COLL VENOUS BLD VENIPUNCTURE: CPT

## 2024-06-05 PROCEDURE — 85610 PROTHROMBIN TIME: CPT

## 2024-06-05 NOTE — PROGRESS NOTES
ANTICOAGULATION MANAGEMENT     Horacio Panda 59 year old male is on warfarin with therapeutic INR result. (Goal INR 2.0-3.0)    Recent labs: (last 7 days)     06/05/24  0825   INR 2.37*       ASSESSMENT     Warfarin Lab Questionnaire    Warfarin Doses Last 7 Days          6/4/2024   Warfarin Lab Questionnaire   Missed doses within past 14 days? No   Changes in diet or alcohol within past 14 days? No   Medication changes since last result? No   Injuries or illness since last result? No   New shortness of breath, severe headaches or sudden changes in vision since last result? No   Abnormal bleeding since last result? No   Upcoming surgery, procedure? No     Previous result: Therapeutic last 2(+) visits  Additional findings: None       PLAN     Recommended plan for no diet, medication or health factor changes affecting INR     Dosing Instructions: Continue your current warfarin dose with next INR in 5 weeks       Summary  As of 6/5/2024      Full warfarin instructions:  7.5 mg every Mon, Fri; 10 mg all other days   Next INR check:  7/10/2024               Detailed voice message left for Horacio with dosing instructions and follow up date.     Contact [phone]  to schedule and with any changes, questions or concerns.     Education provided:   Please call back if any changes to your diet, medications or how you've been taking warfarin    Plan made per [facility] anticoagulation protocol    Bro Smith, RN  Anticoagulation Clinic  6/5/2024    _______________________________________________________________________     Anticoagulation Episode Summary       Current INR goal:  2.0-3.0   TTR:  84.1% (4.9 mo)   Target end date:  Indefinite   Send INR reminders to:  [facility]    Indications    Paroxysmal atrial fibrillation (H) [I48.0]  PFO (patent foramen ovale) [Q21.12]  Atrial fibrillation  unspecified type (H) [I48.91]             Comments:               Anticoagulation Care Providers       Provider Role Specialty Phone  number    Frances Pantoja NP Referring Cardiovascular Disease [phone]    Олег Vizcarra PA-C Referring Family Medicine [phone]

## 2024-06-06 RX ORDER — LOSARTAN POTASSIUM 50 MG/1
50 TABLET ORAL DAILY
Qty: 30 TABLET | Refills: 1 | Status: SHIPPED | OUTPATIENT
Start: 2024-06-06 | End: 2024-06-17

## 2024-06-10 ENCOUNTER — ANCILLARY PROCEDURE (OUTPATIENT)
Dept: CARDIOLOGY | Facility: CLINIC | Age: 59
End: 2024-06-10
Attending: NURSE PRACTITIONER
Payer: COMMERCIAL

## 2024-06-10 ENCOUNTER — LAB (OUTPATIENT)
Dept: LAB | Facility: CLINIC | Age: 59
End: 2024-06-10
Payer: COMMERCIAL

## 2024-06-10 DIAGNOSIS — Q21.12 PFO (PATENT FORAMEN OVALE): ICD-10-CM

## 2024-06-10 DIAGNOSIS — I48.0 PAROXYSMAL ATRIAL FIBRILLATION (H): ICD-10-CM

## 2024-06-10 LAB
ALBUMIN SERPL BCG-MCNC: 4.3 G/DL (ref 3.5–5.2)
ALP SERPL-CCNC: 104 U/L (ref 40–150)
ALT SERPL W P-5'-P-CCNC: 26 U/L (ref 0–70)
ANION GAP SERPL CALCULATED.3IONS-SCNC: 10 MMOL/L (ref 7–15)
AST SERPL W P-5'-P-CCNC: 25 U/L (ref 0–45)
BILIRUB SERPL-MCNC: 0.8 MG/DL
BUN SERPL-MCNC: 14 MG/DL (ref 8–23)
CALCIUM SERPL-MCNC: 9.3 MG/DL (ref 8.6–10)
CHLORIDE SERPL-SCNC: 102 MMOL/L (ref 98–107)
CHOLEST SERPL-MCNC: 156 MG/DL
CREAT SERPL-MCNC: 1.14 MG/DL (ref 0.67–1.17)
DEPRECATED HCO3 PLAS-SCNC: 25 MMOL/L (ref 22–29)
EGFRCR SERPLBLD CKD-EPI 2021: 74 ML/MIN/1.73M2
ERYTHROCYTE [DISTWIDTH] IN BLOOD BY AUTOMATED COUNT: 12.2 % (ref 10–15)
FASTING STATUS PATIENT QL REPORTED: YES
FASTING STATUS PATIENT QL REPORTED: YES
GLUCOSE SERPL-MCNC: 102 MG/DL (ref 70–99)
HCT VFR BLD AUTO: 46 % (ref 40–53)
HDLC SERPL-MCNC: 38 MG/DL
HGB BLD-MCNC: 16 G/DL (ref 13.3–17.7)
LDLC SERPL CALC-MCNC: 73 MG/DL
LVEF ECHO: NORMAL
MCH RBC QN AUTO: 32.8 PG (ref 26.5–33)
MCHC RBC AUTO-ENTMCNC: 34.8 G/DL (ref 31.5–36.5)
MCV RBC AUTO: 94 FL (ref 78–100)
NONHDLC SERPL-MCNC: 118 MG/DL
PLATELET # BLD AUTO: 244 10E3/UL (ref 150–450)
POTASSIUM SERPL-SCNC: 4.2 MMOL/L (ref 3.4–5.3)
PROT SERPL-MCNC: 7.3 G/DL (ref 6.4–8.3)
RBC # BLD AUTO: 4.88 10E6/UL (ref 4.4–5.9)
SODIUM SERPL-SCNC: 137 MMOL/L (ref 135–145)
TRIGL SERPL-MCNC: 227 MG/DL
WBC # BLD AUTO: 7.8 10E3/UL (ref 4–11)

## 2024-06-10 PROCEDURE — 93306 TTE W/DOPPLER COMPLETE: CPT | Performed by: STUDENT IN AN ORGANIZED HEALTH CARE EDUCATION/TRAINING PROGRAM

## 2024-06-10 PROCEDURE — 80053 COMPREHEN METABOLIC PANEL: CPT | Performed by: PATHOLOGY

## 2024-06-10 PROCEDURE — 85027 COMPLETE CBC AUTOMATED: CPT | Performed by: PATHOLOGY

## 2024-06-10 PROCEDURE — 36415 COLL VENOUS BLD VENIPUNCTURE: CPT | Performed by: PATHOLOGY

## 2024-06-10 PROCEDURE — 80061 LIPID PANEL: CPT | Performed by: PATHOLOGY

## 2024-06-17 ENCOUNTER — OFFICE VISIT (OUTPATIENT)
Dept: CARDIOLOGY | Facility: CLINIC | Age: 59
End: 2024-06-17
Attending: NURSE PRACTITIONER
Payer: COMMERCIAL

## 2024-06-17 VITALS
WEIGHT: 283.1 LBS | OXYGEN SATURATION: 97 % | SYSTOLIC BLOOD PRESSURE: 127 MMHG | DIASTOLIC BLOOD PRESSURE: 88 MMHG | HEART RATE: 77 BPM | BODY MASS INDEX: 36.53 KG/M2

## 2024-06-17 DIAGNOSIS — K21.9 GASTROESOPHAGEAL REFLUX DISEASE WITHOUT ESOPHAGITIS: ICD-10-CM

## 2024-06-17 DIAGNOSIS — I48.0 PAROXYSMAL ATRIAL FIBRILLATION (H): ICD-10-CM

## 2024-06-17 DIAGNOSIS — E78.00 HIGH CHOLESTEROL: ICD-10-CM

## 2024-06-17 DIAGNOSIS — Z87.74 S/P PATENT FORAMEN OVALE CLOSURE: Primary | ICD-10-CM

## 2024-06-17 DIAGNOSIS — Q21.12 PFO (PATENT FORAMEN OVALE): ICD-10-CM

## 2024-06-17 DIAGNOSIS — I10 ESSENTIAL HYPERTENSION WITH GOAL BLOOD PRESSURE LESS THAN 140/90: ICD-10-CM

## 2024-06-17 PROCEDURE — 99213 OFFICE O/P EST LOW 20 MIN: CPT | Performed by: NURSE PRACTITIONER

## 2024-06-17 PROCEDURE — 99214 OFFICE O/P EST MOD 30 MIN: CPT | Performed by: NURSE PRACTITIONER

## 2024-06-17 RX ORDER — LOSARTAN POTASSIUM 50 MG/1
50 TABLET ORAL DAILY
Qty: 90 TABLET | Refills: 3 | Status: SHIPPED | OUTPATIENT
Start: 2024-06-17

## 2024-06-17 RX ORDER — METOPROLOL TARTRATE 25 MG/1
25 TABLET, FILM COATED ORAL 2 TIMES DAILY
Qty: 180 TABLET | Refills: 3 | Status: SHIPPED | OUTPATIENT
Start: 2024-06-17 | End: 2024-09-04

## 2024-06-17 RX ORDER — ATORVASTATIN CALCIUM 80 MG/1
80 TABLET, FILM COATED ORAL DAILY
Qty: 90 TABLET | Refills: 3 | Status: SHIPPED | OUTPATIENT
Start: 2024-06-17

## 2024-06-17 RX ORDER — AMLODIPINE BESYLATE 10 MG/1
10 TABLET ORAL DAILY
Qty: 90 TABLET | Refills: 3 | Status: SHIPPED | OUTPATIENT
Start: 2024-06-17

## 2024-06-17 ASSESSMENT — PAIN SCALES - GENERAL: PAINLEVEL: NO PAIN (0)

## 2024-06-17 NOTE — NURSING NOTE
Chief Complaint   Patient presents with    Follow Up     PFO closure 1 year follow up       Vitals were taken, medications reconciled.    Prateek Platt, Facilitator   2:23 PM

## 2024-06-17 NOTE — LETTER
"6/17/2024      RE: Horacio Panda  [address] [address]       Dear Colleague,    Thank you for the opportunity to participate in the care of your patient, Horacio Panda, at the [facility] [place] [facility]. Please see a copy of my visit note below.        [facility]  CARDIOVASCULAR DIVISION  RETURN VISIT      HPI:     Horacio Panda 59 year old who presents for 1 year PFO closure follow-up. Patient has a history of HTN, HLD and acute CVA of the right posterior denise April 2023 at which time he was noted to have PFO by ESTELA. After discussion with stroke neurology empiric DOAC started given PFO and referred to structural cardiology for consideration of PFO closure. He was noted to have negative hypercoagulable work up and no AF on cardiac monitor thus underwent successful PFO with a 30 mm GORE cardioform on 6/16/23. Post procedure ECHO showed normal LV function without pericardial effusion. He was discharged on aspirin and Plavix, anticoagulation was discontinued. Post procedure developed palpitations. Cardiac monitor showed a-fib vs a-tach. Metoprolol and anticoagulation initiated.     Since his last visit he has been feeling really well. He is walking 30-40 minutes a day. He is also eating healthier and has lost weight. His exertional dyspnea and fatigue have markedly improved. He is also sleeping better. Heart fluttering and symptoms of feeling \"off\" have completely resolved. He has had no palpitations since last fall when we started metoprolol. He purchased a [employer] watch in April and has had no AF noted on his watch. He has no lightheadedness, syncope, heart failure. Home /80.     He is tolerating Warfarin without bleeding concerns. He is looking forward to stopping AC in the near future.      PAST MEDICAL HISTORY:   Arthritis  CVA 5/2023  PFO s/p PFO closure 6/2023  Post procedure AF  HTN  HLD     PAST SURGICAL " HISTORY:     Past Surgical History:   Procedure Laterality Date    CV PFO CLOSURE N/A 6/16/2023    Procedure: Heart Cath PFO Closure;  Surgeon: Gilbert Stone MD;  Location:  HEART CARDIAC CATH LAB    [facility] ARTHROPLASTY I-P JT  Age 26    Lft Hip      CURRENT MEDICATIONS:     Current Outpatient Medications   Medication Sig Dispense Refill    amLODIPine (NORVASC) 10 MG tablet Take 1 tablet (10 mg) by mouth daily 90 tablet 0    atorvastatin (LIPITOR) 80 MG tablet Take 1 tablet (80 mg) by mouth daily 90 tablet 0    losartan (COZAAR) 50 MG tablet TAKE 1 TABLET (50 MG) BY MOUTH DAILY 30 tablet 1    metoprolol tartrate (LOPRESSOR) 25 MG tablet Take 1 tablet (25 mg) by mouth 2 times daily 180 tablet 3    omeprazole (PRILOSEC) 20 MG DR capsule TAKE 1 CAPSULE BY MOUTH DAILY FOR 90 DAYS. TAKE 1 HOUR BEFORE A MEAL. 30 capsule 1    predniSONE (DELTASONE) 20 MG tablet 40 mg every morning for five days (Patient not taking: Reported on 11/16/2023) 10 tablet 0    warfarin ANTICOAGULANT (COUMADIN) 5 MG tablet Take 7.5 mg Mon, Fri and 10 mg all other days or as directed by the coumadin clinic. 180 tablet 1    warfarin ANTICOAGULANT (COUMADIN) 5 MG tablet 5 mg daily - Dosing per INR clinic 90 tablet 1     No current facility-administered medications for this visit.        ALLERGIES:      No Known Allergies     FAMILY HISTORY:     Family History   Problem Relation Age of Onset    Hypertension Mother     Cancer Father     Hypertension Sister     Cancer Maternal Grandfather     Cerebrovascular Disease Paternal Grandmother     Heart Disease Paternal Grandfather       SOCIAL HISTORY:     Social History     Tobacco Use    Smoking status: Never    Smokeless tobacco: Never   Substance Use Topics    Alcohol use: Yes     Comment: occ        REVIEW OF SYSTEMS:     Constitutional: No fevers or chills  Skin: No new rash or itching  Eyes: No acute change in vision  Ears/Nose/Throat: No purulent rhinorrhea, new hearing loss, or new  vertigo  Respiratory: No cough or hemoptysis  Cardiovascular: See HPI  Gastrointestinal: No change in appetite, vomiting, hematemesis or diarrhea  Genitourinary: No dysuria or hematuria  Musculoskeletal: No new back pain, neck pain or muscle pain  Neurologic: No new headaches, focal weakness or behavior changes  Psychiatric: No hallucinations, excessive alcohol consumption or illegal drug usage  Hematologic/Lymphatic/Immunologic: No bleeding, chills, fever, night sweats or weight loss  Endocrine: No new cold intolerance, heat intolerance, polyphagia, polydipsia or polyuria      PHYSICAL EXAMINATION:     /88 (BP Location: Right arm, Patient Position: Chair, Cuff Size: Adult Large)   Pulse 77   Wt 128.4 kg (283 lb 1.6 oz)   SpO2 97%   BMI 36.53 kg/m      GENERAL: No acute distress.  HEENT: EOMI. Sclerae white, not injected. Nares clear. Pharynx without erythema or exudate.   Neck: No adenopathy. No thyromegaly. No jugular venous distension.   Heart: Regular rate and rhythm. No murmur.   Lungs: Clear to auscultation. No ronchi, wheezes, rales.   Abdomen: Soft, nontender, nondistended. Bowel sounds present.  Extremities: No clubbing, cyanosis, or edema.   Neurologic: Alert and oriented to person/place/time, normal speech and affect. No focal deficits.  Skin: No petechiae, purpura or rash.     LABORATORY DATA:     CBC RESULTS:   Recent Labs   Lab Test 06/10/24  1246   WBC 7.8   RBC 4.88   HGB 16.0   HCT 46.0   MCV 94   MCH 32.8   MCHC 34.8   RDW 12.2        Last Comprehensive Metabolic Panel:  Lab Results   Component Value Date     06/10/2024    POTASSIUM 4.2 06/10/2024    CHLORIDE 102 06/10/2024    CO2 25 06/10/2024    ANIONGAP 10 06/10/2024     (H) 06/10/2024    BUN 14.0 06/10/2024    CR 1.14 06/10/2024    GFRESTIMATED 74 06/10/2024    STAN 9.3 06/10/2024       Recent Labs   Lab Test 06/10/24  1246 04/05/22  0850   CHOL 156 173   HDL 38* 49   LDL 73 103*   TRIG 227* 107        PROCEDURES &  FURTHER ASSESSMENTS:     ECHO 6/10/24  Interpretation Summary  Global and regional left ventricular function is normal with an EF of 60-65%.  Global right ventricular function is normal.  s/p PFO closure with 30mm Cardioform septal occluder device. Subcostal views  are suboptimal, device is not well visualized  Aorta is mildly dilated: Aortic root measures 4.1cm, proximal ascending aorta  measures 4.3cm  There has been no change.    ECHO 12/11/23:  Interpretation Summary  An ASD closure device was seen in the expected anatomic position.The device  was well seated and without residual shunt by color Doppler mapping.  Ascending aorta 4.3 cm.Normal aortic index for BSA of 2.5m2.  No pericardial effusion is present.  Aortic size was not assessed in prior studies.  _________________________________      EKG 7/28/23  NSR HR 76 no arrhythmias     ECHO 7/28/23:  Interpretation Summary  Global and regional left ventricular function is normal with an EF of 55-60%.  Global right ventricular function is normal.  No significant valvular abnormalities present.  Post PFO closure with no shunt evident on color Doppler.  Small inferior vena cava size consistent with hypovolemia.  No pericardial effusion is present.  ______________________________________________________________________________  Left Ventricle  Left ventricular size is normal. Left ventricular wall thickness is normal.  Global and regional left ventricular function is normal with an EF of 55-60%.     Right Ventricle  The right ventricle is normal size. Global right ventricular function is  normal.     Atria  Both atria appear normal. s/p PFO closure with 30mm Cardioform septal occluder  device. Closure device not well seen in these limited views.     Mitral Valve  The mitral valve is normal.     Aortic Valve  Aortic valve is normal in structure and function.     Tricuspid Valve  The tricuspid valve is normal. Trace tricuspid insufficiency is present. The  right  ventricular systolic pressure is approximated at 15.9 mmHg plus the  right atrial pressure. Pulmonary artery systolic pressure is normal.     Pulmonic Valve  The pulmonic valve is normal.     Vessels  The aorta root is normal. Small inferior vena cava size consistent with  hypovolemia.     Pericardium  No pericardial effusion is present.     Miscellaneous  No significant valvular abnormalities present.     Compared to Previous Study  This study was compared with the study from 6.2023 . No significant changes  noted.    ECHO post procedure 6/16/23  Interpretation Summary  Global and regional left ventricular function is normal with an EF of 60-65%.  Global right ventricular function is normal.  s/p PFO closure with 30mm Cardioform septal occluder device. Closure device  not well seen in these limited views.  There was no shunt at the atrial septal level as assessed by color Doppler.  No pericardial effusion is present.     ______________________________________________________________________________  Left Ventricle  Left ventricular size is normal. Global and regional left ventricular function  is normal with an EF of 60-65%.     Right Ventricle  The right ventricle is normal size. Global right ventricular function is  normal.     Atria  There was no shunt at the atrial septal level as assessed by color Doppler.  s/p PFO closure with 30mm Cardioform septal occluder device.     Vessels  The inferior vena cava was normal in size with preserved respiratory  variability.     Pericardium  No pericardial effusion is present.     CLINICAL IMPRESSION:   Horacio Panda 59 year old who presents for 1 year PFO closure follow-up. Patient has a history of HTN, HLD and acute CVA of the right posterior denise April 2023 at which time he was noted to have PFO by ESTELA. After discussion with stroke neurology empiric DOAC started given PFO and referred to structural cardiology for consideration of PFO closure. He underwent successful PFO  with a 30 mm GORE cardioform on 6/16/23. Post procedure ECHO showed normal LV function without pericardial effusion. He was discharged on aspirin and Plavix, anticoagulation was discontinued. Course notable paroxysmal AF vs AT. He is now on metoprolol and Eliquis. Symptoms seem to have subsided after starting metoprolol last fall. He has had no further episode of AF by symptoms or his smart watch. Will follow-up with EP in a few months to discuss duration of AC. Otherwise no cardiac symptoms today and ECHO shows well seated PFO closure device, as well as stable dilated ascending aorta.      Continue Warfarin INR goal 2-3  2. No APT while on AC - when warfarin discontinued, resume ASA 81 mg daily   3. No longer requires SBE prophylaxis now that > 6 months out   4. Follow-up with me in 1 year with ECHO and labs prior    KALINA Machdao, CNP  [facility] Cardiology Team

## 2024-06-17 NOTE — PATIENT INSTRUCTIONS
You were seen today in the Structural Heart Clinic at the [facility].    Cardiology provider you saw during your visit: Frances Pantoja NP    Your ECHO today looks good. PFO closure is well seated and ascending aorta is stable at 4.3. Plan to continue warfarin for now. Follow-up with EP in 2 months to determine duration of anticoagulation.     Instructions:   Continue anticoagulation for atrial fibrillation   You no longer need antibiotics before the dentist now that 6 months post PFO closure   Continue daily exercise and heart healthy diet   Follow-up in structural clinic in 12 months with echo prior     Questions and scheduling:   First call: Structural Heart  Lizbeth Rosales [phone]    General scheduling line: [phone].   First press #1 for the University and then press #3 for Medical Questions to reach the Cardiology triage nurse.     On Call Cardiologist for after hours or on weekends: [phone], press option #4 and ask to speak to the on-call Cardiologist.

## 2024-06-17 NOTE — PROGRESS NOTES
"    STRUCTURAL HE[facility] CARE  CARDIOVASCULAR DIVISION  RETURN VISIT      HPI:     Horacio Panda 59 year old who presents for 1 year PFO closure follow-up. Patient has a history of HTN, HLD and acute CVA of the right posterior denise April 2023 at which time he was noted to have PFO by ESTELA. After discussion with stroke neurology empiric DOAC started given PFO and referred to structural cardiology for consideration of PFO closure. He was noted to have negative hypercoagulable work up and no AF on cardiac monitor thus underwent successful PFO with a 30 mm GORE cardioform on 6/16/23. Post procedure ECHO showed normal LV function without pericardial effusion. He was discharged on aspirin and Plavix, anticoagulation was discontinued. Post procedure developed palpitations. Cardiac monitor showed a-fib vs a-tach. Metoprolol and anticoagulation initiated.     Since his last visit he has been feeling really well. He is walking 30-40 minutes a day. He is also eating healthier and has lost weight. His exertional dyspnea and fatigue have markedly improved. He is also sleeping better. Heart fluttering and symptoms of feeling \"off\" have completely resolved. He has had no palpitations since last fall when we started metoprolol. He purchased a [employer] watch in April and has had no AF noted on his watch. He has no lightheadedness, syncope, heart failure. Home /80.     He is tolerating Warfarin without bleeding concerns. He is looking forward to stopping AC in the near future.      PAST MEDICAL HISTORY:   Arthritis  CVA 5/2023  PFO s/p PFO closure 6/2023  Post procedure AF  HTN  HLD     PAST SURGICAL HISTORY:     Past Surgical History:   Procedure Laterality Date    CV PFO CLOSURE N/A 6/16/2023    Procedure: Heart Cath PFO Closure;  Surgeon: Gilbert Stone MD;  Location:  HEART CARDIAC CATH LAB    Z ARTHROPLASTY I-P JT  Age 26    Lft Hip      CURRENT MEDICATIONS:     Current Outpatient Medications   Medication Sig Dispense " Refill    amLODIPine (NORVASC) 10 MG tablet Take 1 tablet (10 mg) by mouth daily 90 tablet 0    atorvastatin (LIPITOR) 80 MG tablet Take 1 tablet (80 mg) by mouth daily 90 tablet 0    losartan (COZAAR) 50 MG tablet TAKE 1 TABLET (50 MG) BY MOUTH DAILY 30 tablet 1    metoprolol tartrate (LOPRESSOR) 25 MG tablet Take 1 tablet (25 mg) by mouth 2 times daily 180 tablet 3    omeprazole (PRILOSEC) 20 MG DR capsule TAKE 1 CAPSULE BY MOUTH DAILY FOR 90 DAYS. TAKE 1 HOUR BEFORE A MEAL. 30 capsule 1    predniSONE (DELTASONE) 20 MG tablet 40 mg every morning for five days (Patient not taking: Reported on 11/16/2023) 10 tablet 0    warfarin ANTICOAGULANT (COUMADIN) 5 MG tablet Take 7.5 mg Mon, Fri and 10 mg all other days or as directed by the coumadin clinic. 180 tablet 1    warfarin ANTICOAGULANT (COUMADIN) 5 MG tablet 5 mg daily - Dosing per INR clinic 90 tablet 1     No current facility-administered medications for this visit.        ALLERGIES:      No Known Allergies     FAMILY HISTORY:     Family History   Problem Relation Age of Onset    Hypertension Mother     Cancer Father     Hypertension Sister     Cancer Maternal Grandfather     Cerebrovascular Disease Paternal Grandmother     Heart Disease Paternal Grandfather       SOCIAL HISTORY:     Social History     Tobacco Use    Smoking status: Never    Smokeless tobacco: Never   Substance Use Topics    Alcohol use: Yes     Comment: occ        REVIEW OF SYSTEMS:     Constitutional: No fevers or chills  Skin: No new rash or itching  Eyes: No acute change in vision  Ears/Nose/Throat: No purulent rhinorrhea, new hearing loss, or new vertigo  Respiratory: No cough or hemoptysis  Cardiovascular: See HPI  Gastrointestinal: No change in appetite, vomiting, hematemesis or diarrhea  Genitourinary: No dysuria or hematuria  Musculoskeletal: No new back pain, neck pain or muscle pain  Neurologic: No new headaches, focal weakness or behavior changes  Psychiatric: No hallucinations,  excessive alcohol consumption or illegal drug usage  Hematologic/Lymphatic/Immunologic: No bleeding, chills, fever, night sweats or weight loss  Endocrine: No new cold intolerance, heat intolerance, polyphagia, polydipsia or polyuria      PHYSICAL EXAMINATION:     /88 (BP Location: Right arm, Patient Position: Chair, Cuff Size: Adult Large)   Pulse 77   Wt 128.4 kg (283 lb 1.6 oz)   SpO2 97%   BMI 36.53 kg/m      GENERAL: No acute distress.  HEENT: EOMI. Sclerae white, not injected. Nares clear. Pharynx without erythema or exudate.   Neck: No adenopathy. No thyromegaly. No jugular venous distension.   Heart: Regular rate and rhythm. No murmur.   Lungs: Clear to auscultation. No ronchi, wheezes, rales.   Abdomen: Soft, nontender, nondistended. Bowel sounds present.  Extremities: No clubbing, cyanosis, or edema.   Neurologic: Alert and oriented to person/place/time, normal speech and affect. No focal deficits.  Skin: No petechiae, purpura or rash.     LABORATORY DATA:     CBC RESULTS:   Recent Labs   Lab Test 06/10/24  1246   WBC 7.8   RBC 4.88   HGB 16.0   HCT 46.0   MCV 94   MCH 32.8   MCHC 34.8   RDW 12.2        Last Comprehensive Metabolic Panel:  Lab Results   Component Value Date     06/10/2024    POTASSIUM 4.2 06/10/2024    CHLORIDE 102 06/10/2024    CO2 25 06/10/2024    ANIONGAP 10 06/10/2024     (H) 06/10/2024    BUN 14.0 06/10/2024    CR 1.14 06/10/2024    GFRESTIMATED 74 06/10/2024    STAN 9.3 06/10/2024       Recent Labs   Lab Test 06/10/24  1246 04/05/22  0850   CHOL 156 173   HDL 38* 49   LDL 73 103*   TRIG 227* 107        PROCEDURES & FURTHER ASSESSMENTS:     ECHO 6/10/24  Interpretation Summary  Global and regional left ventricular function is normal with an EF of 60-65%.  Global right ventricular function is normal.  s/p PFO closure with 30mm Cardioform septal occluder device. Subcostal views  are suboptimal, device is not well visualized  Aorta is mildly dilated: Aortic  root measures 4.1cm, proximal ascending aorta  measures 4.3cm  There has been no change.    ECHO 12/11/23:  Interpretation Summary  An ASD closure device was seen in the expected anatomic position.The device  was well seated and without residual shunt by color Doppler mapping.  Ascending aorta 4.3 cm.Normal aortic index for BSA of 2.5m2.  No pericardial effusion is present.  Aortic size was not assessed in prior studies.  _________________________________      EKG 7/28/23  NSR HR 76 no arrhythmias     ECHO 7/28/23:  Interpretation Summary  Global and regional left ventricular function is normal with an EF of 55-60%.  Global right ventricular function is normal.  No significant valvular abnormalities present.  Post PFO closure with no shunt evident on color Doppler.  Small inferior vena cava size consistent with hypovolemia.  No pericardial effusion is present.  ______________________________________________________________________________  Left Ventricle  Left ventricular size is normal. Left ventricular wall thickness is normal.  Global and regional left ventricular function is normal with an EF of 55-60%.     Right Ventricle  The right ventricle is normal size. Global right ventricular function is  normal.     Atria  Both atria appear normal. s/p PFO closure with 30mm Cardioform septal occluder  device. Closure device not well seen in these limited views.     Mitral Valve  The mitral valve is normal.     Aortic Valve  Aortic valve is normal in structure and function.     Tricuspid Valve  The tricuspid valve is normal. Trace tricuspid insufficiency is present. The  right ventricular systolic pressure is approximated at 15.9 mmHg plus the  right atrial pressure. Pulmonary artery systolic pressure is normal.     Pulmonic Valve  The pulmonic valve is normal.     Vessels  The aorta root is normal. Small inferior vena cava size consistent with  hypovolemia.     Pericardium  No pericardial effusion is present.      Miscellaneous  No significant valvular abnormalities present.     Compared to Previous Study  This study was compared with the study from 6.2023 . No significant changes  noted.    ECHO post procedure 6/16/23  Interpretation Summary  Global and regional left ventricular function is normal with an EF of 60-65%.  Global right ventricular function is normal.  s/p PFO closure with 30mm Cardioform septal occluder device. Closure device  not well seen in these limited views.  There was no shunt at the atrial septal level as assessed by color Doppler.  No pericardial effusion is present.     ______________________________________________________________________________  Left Ventricle  Left ventricular size is normal. Global and regional left ventricular function  is normal with an EF of 60-65%.     Right Ventricle  The right ventricle is normal size. Global right ventricular function is  normal.     Atria  There was no shunt at the atrial septal level as assessed by color Doppler.  s/p PFO closure with 30mm Cardioform septal occluder device.     Vessels  The inferior vena cava was normal in size with preserved respiratory  variability.     Pericardium  No pericardial effusion is present.     CLINICAL IMPRESSION:   Horacio Panda 59 year old who presents for 1 year PFO closure follow-up. Patient has a history of HTN, HLD and acute CVA of the right posterior denise April 2023 at which time he was noted to have PFO by ESTELA. After discussion with stroke neurology empiric DOAC started given PFO and referred to structural cardiology for consideration of PFO closure. He underwent successful PFO with a 30 mm GORE cardioform on 6/16/23. Post procedure ECHO showed normal LV function without pericardial effusion. He was discharged on aspirin and Plavix, anticoagulation was discontinued. Course notable paroxysmal AF vs AT. He is now on metoprolol and Eliquis. Symptoms seem to have subsided after starting metoprolol last fall. He has had  no further episode of AF by symptoms or his smart watch. Will follow-up with EP in a few months to discuss duration of AC. Otherwise no cardiac symptoms today and ECHO shows well seated PFO closure device, as well as stable dilated ascending aorta.      Continue Warfarin INR goal 2-3  2. No APT while on AC - when warfarin discontinued, resume ASA 81 mg daily   3. No longer requires SBE prophylaxis now that > 6 months out   4. Follow-up with me in 1 year with ECHO and labs prior    KALINA Machado, CNP  [facility] Cardiology Team

## 2024-06-22 ENCOUNTER — HEALTH MAINTENANCE LETTER (OUTPATIENT)
Age: 59
End: 2024-06-22

## 2024-07-02 ENCOUNTER — ORDERS ONLY (AUTO-RELEASED) (OUTPATIENT)
Dept: CARDIOLOGY | Facility: CLINIC | Age: 59
End: 2024-07-02
Payer: COMMERCIAL

## 2024-07-02 DIAGNOSIS — I47.19 ATRIAL TACHYCARDIA, PAROXYSMAL (H): ICD-10-CM

## 2024-07-02 DIAGNOSIS — I48.0 PAROXYSMAL ATRIAL FIBRILLATION (H): ICD-10-CM

## 2024-07-16 ENCOUNTER — NURSE TRIAGE (OUTPATIENT)
Dept: FAMILY MEDICINE | Facility: CLINIC | Age: 59
End: 2024-07-16

## 2024-07-16 ENCOUNTER — OFFICE VISIT (OUTPATIENT)
Dept: FAMILY MEDICINE | Facility: CLINIC | Age: 59
End: 2024-07-16
Payer: COMMERCIAL

## 2024-07-16 VITALS
HEIGHT: 73 IN | HEART RATE: 84 BPM | TEMPERATURE: 98 F | RESPIRATION RATE: 16 BRPM | OXYGEN SATURATION: 100 % | BODY MASS INDEX: 37.24 KG/M2 | DIASTOLIC BLOOD PRESSURE: 87 MMHG | WEIGHT: 281 LBS | SYSTOLIC BLOOD PRESSURE: 128 MMHG

## 2024-07-16 DIAGNOSIS — H11.31 SUBCONJUNCTIVAL HEMORRHAGE OF RIGHT EYE: Primary | ICD-10-CM

## 2024-07-16 PROCEDURE — 99213 OFFICE O/P EST LOW 20 MIN: CPT | Performed by: FAMILY MEDICINE

## 2024-07-16 ASSESSMENT — PAIN SCALES - GENERAL: PAINLEVEL: NO PAIN (0)

## 2024-07-16 ASSESSMENT — ENCOUNTER SYMPTOMS: EYE PAIN: 1

## 2024-07-16 NOTE — PROGRESS NOTES
"  Assessment & Plan     Subconjunctival hemorrhage of right eye  Asymptomatic. Monitor, should resolve on its own  Follow-up with change in vision or pain          BMI  Estimated body mass index is 37.07 kg/m  as calculated from the following:    Height as of this encounter: 1.854 m (6' 1\").    Weight as of this encounter: 127.5 kg (281 lb).   Weight management plan: Discussed healthy diet and exercise guidelines          Telly Leonard is a 59 year old, presenting for the following health issues:  Eye Problem (Right/)      7/16/2024    11:41 AM   Additional Questions   Roomed by lucas   Accompanied by wife     Eye Problem     History of Present Illness       Reason for visit:  Eye problem  Symptom onset:  3-7 days ago    He eats 4 or more servings of fruits and vegetables daily.He consumes 0 sweetened beverage(s) daily.He exercises with enough effort to increase his heart rate 30 to 60 minutes per day.  He exercises with enough effort to increase his heart rate 4 days per week.   He is taking medications regularly.     Pt here with wife  Noticed blood in his right eye, medially  Occurred a few days ago  No pain. Vision is good  He is on blood thinners.   There was no trauma.       Review of Systems  Constitutional, HEENT, cardiovascular, pulmonary, gi and gu systems are negative, except as otherwise noted.      Objective    /87   Pulse 84   Temp 98  F (36.7  C) (Oral)   Resp 16   Ht 1.854 m (6' 1\")   Wt 127.5 kg (281 lb)   SpO2 100%   BMI 37.07 kg/m    Body mass index is 37.07 kg/m .  Physical Exam   GENERAL: alert and no distress  EYES: Eyes grossly normal to inspection and conjunctiva/corneas- subconjunctival hemorrhage OD    No results found for this or any previous visit (from the past 24 hour(s)).        Signed Electronically by: Tessa Pineda MD    "

## 2024-07-16 NOTE — TELEPHONE ENCOUNTER
There is no consent to communicate with anyone but the patient. (TD-7/16/2024)    Patient reports that since Saturday morning he has had blood in the sclera of his right eye. It is in the side closest to his nose.  He reports that he woke up Sunday and had a little blurry/cloudy vision and rubbed his eye. He does report that he is on blood thinners, but has not had a dose change in 2 months. He reports that it hurts a little bit, more of an ache.     Denies: fever, injury, other bleeding on his body/urine/stool    Nursing advice:  Patient is to be seen in office today.  He was assisted in making the appointment as listed below.  Patient was given signs and symptoms to go to the E.R. or call 911.  Patient verbalizes good understanding, agrees with plan and states he needs no further support. Yesica [email or web address].NDinesh    Reason for Disposition   Eye pain present > 24 hours    Additional Information   Negative: Severe eye pain   Negative: Complete loss of vision in one or both eyes   Negative: Eyelids are very swollen (shut or almost) and fever   Negative: Eyelid (outer) is very red and fever   Negative: Foreign body sensation ('feels like something is in there') and irrigation didn't help   Negative: Vomiting   Negative: Ulcer or sore seen on the cornea (clear center part of the eye)   Negative: Recent eye surgery and increasing eye pain   Negative: Blurred vision and new or worsening   Negative: Patient sounds very sick or weak to the triager   Negative: Eye pain/discomfort and more than mild   Negative: Eyelids are very swollen (shut or almost) and no fever   Negative: Painful rash near eye and multiple small blisters grouped together   Negative: Pain followed bright light exposure from welding   Negative: Looking at light causes severe pain (i.e., photophobia)   Negative: Yellow or green pus occurs    Protocols used: Eye Pain-A-[place]

## 2024-07-17 ENCOUNTER — LAB (OUTPATIENT)
Dept: LAB | Facility: CLINIC | Age: 59
End: 2024-07-17
Payer: COMMERCIAL

## 2024-07-17 ENCOUNTER — MYC MEDICAL ADVICE (OUTPATIENT)
Dept: ANTICOAGULATION | Facility: CLINIC | Age: 59
End: 2024-07-17
Payer: COMMERCIAL

## 2024-07-17 DIAGNOSIS — Q21.12 PFO (PATENT FORAMEN OVALE): ICD-10-CM

## 2024-07-17 DIAGNOSIS — I48.0 PAROXYSMAL ATRIAL FIBRILLATION (H): ICD-10-CM

## 2024-07-17 LAB — INR PPP: 2.95 (ref 0.85–1.15)

## 2024-07-17 PROCEDURE — 36415 COLL VENOUS BLD VENIPUNCTURE: CPT

## 2024-07-17 PROCEDURE — 85610 PROTHROMBIN TIME: CPT

## 2024-07-17 NOTE — TELEPHONE ENCOUNTER
ANTICOAGULATION     Horacio Panda is overdue for an INR check.     My chart sent     Sandra Curran RN

## 2024-07-18 ENCOUNTER — ANTICOAGULATION THERAPY VISIT (OUTPATIENT)
Dept: ANTICOAGULATION | Facility: CLINIC | Age: 59
End: 2024-07-18
Payer: COMMERCIAL

## 2024-07-18 DIAGNOSIS — Q21.12 PFO (PATENT FORAMEN OVALE): ICD-10-CM

## 2024-07-18 DIAGNOSIS — I48.91 ATRIAL FIBRILLATION, UNSPECIFIED TYPE (H): ICD-10-CM

## 2024-07-18 DIAGNOSIS — I48.0 PAROXYSMAL ATRIAL FIBRILLATION (H): Primary | ICD-10-CM

## 2024-07-18 NOTE — PROGRESS NOTES
ANTICOAGULATION MANAGEMENT     Horacio Panda 59 year old male is on warfarin with therapeutic INR result. (Goal INR 2.0-3.0)    Recent labs: (last 7 days)     07/17/24  1209   INR 2.95*       ASSESSMENT     Source(s): Chart Review  Previous INR was Therapeutic last 2(+) visits  Medication, diet, health changes since last INR: OV yesterday for Subconjunctival hemorrhage of right eye  Asymptomatic. Monitor, should resolve on its own  Follow-up with change in vision or pain.       PLAN     Recommended plan for temporary change(s) affecting INR     Dosing Instructions: Continue your current warfarin dose with next INR in 6 weeks. If eye does not resolve or worsens, check the INR sooner.        Summary  As of 7/18/2024      Full warfarin instructions:  7.5 mg every Mon, Fri; 10 mg all other days   Next INR check:  8/29/2024               Detailed voice message left for Horacio with dosing instructions and follow up date.   Sent [employer] message with dosing and follow up instructions    Contact [phone] to schedule and with any changes, questions or concerns.     Education provided: Symptom monitoring: monitoring for bleeding signs and symptoms and when to seek medical attention/emergency care  Contact [phone] with any changes, questions or concerns.     Plan made per [facility] anticoagulation protocol    Jolly VALENCIA RN  Anticoagulation Clinic  7/18/2024    _______________________________________________________________________     Anticoagulation Episode Summary       Current INR goal:  2.0-3.0   TTR:  87.7% (6.3 mo)   Target end date:  Indefinite   Send INR reminders to:  [facility]    Indications    Paroxysmal atrial fibrillation (H) [I48.0]  PFO (patent foramen ovale) [Q21.12]  Atrial fibrillation  unspecified type (H) [I48.91]             Comments:               Anticoagulation Care Providers       Provider Role Specialty Phone number    Frances Pantoja NP Referring Cardiovascular Disease [phone]     Олег Vizcarra PA-C [facility] [phone]

## 2024-08-19 PROCEDURE — 93248 EXT ECG>7D<15D REV&INTERPJ: CPT | Performed by: INTERNAL MEDICINE

## 2024-08-26 NOTE — TELEPHONE ENCOUNTER
RECORDS RECEIVED FROM:    DATE RECEIVED:    NOTES STATUS DETAILS   OFFICE NOTE from referring provider  Internal    OFFICE NOTE from other cardiologists  Internal    RECORDS from hospital/ED Internal    MEDICATION LIST Internal    GENERAL CARDIO RECORDS   (ALL APPOINTMENT TYPES)     LABS (CBC,BMP,CMP, TSH) Internal    EKG (STRIPS & REPORTS) Internal 10-25-23   MONITORS (STRIPS & REPORTS) Internal 8-19-24   ECHOS (IMAGES AND REPORTS) Internal 6-10-24   STRESS TESTS (IMAGES AND REPORTS) N/A    cMRI (IMAGES AND REPORTS) N/A    Cardiac cath (IMAGES AND REPORTS) Internal 6-16-23   CT/CTA (IMAGES AND REPORTS) N/A    NEW EP     ICD/PACEMAKER IMPLANT No    CARDIOVERSION N/A    ABLATION OP NOTE (LAST 5 YEARS) N/A

## 2024-09-04 ENCOUNTER — OFFICE VISIT (OUTPATIENT)
Dept: CARDIOLOGY | Facility: CLINIC | Age: 59
End: 2024-09-04
Attending: NURSE PRACTITIONER
Payer: COMMERCIAL

## 2024-09-04 ENCOUNTER — DOCUMENTATION ONLY (OUTPATIENT)
Dept: ANTICOAGULATION | Facility: CLINIC | Age: 59
End: 2024-09-04

## 2024-09-04 ENCOUNTER — PRE VISIT (OUTPATIENT)
Dept: CARDIOLOGY | Facility: CLINIC | Age: 59
End: 2024-09-04
Payer: COMMERCIAL

## 2024-09-04 VITALS
BODY MASS INDEX: 37.19 KG/M2 | SYSTOLIC BLOOD PRESSURE: 134 MMHG | DIASTOLIC BLOOD PRESSURE: 91 MMHG | HEART RATE: 78 BPM | OXYGEN SATURATION: 97 % | WEIGHT: 281.9 LBS

## 2024-09-04 DIAGNOSIS — Q21.12 PFO (PATENT FORAMEN OVALE): ICD-10-CM

## 2024-09-04 DIAGNOSIS — I48.91 ATRIAL FIBRILLATION, UNSPECIFIED TYPE (H): ICD-10-CM

## 2024-09-04 DIAGNOSIS — I48.0 PAROXYSMAL ATRIAL FIBRILLATION (H): Primary | ICD-10-CM

## 2024-09-04 DIAGNOSIS — I47.19 ATRIAL TACHYCARDIA, PAROXYSMAL (H): ICD-10-CM

## 2024-09-04 PROCEDURE — 93005 ELECTROCARDIOGRAM TRACING: CPT

## 2024-09-04 PROCEDURE — 99213 OFFICE O/P EST LOW 20 MIN: CPT | Performed by: NURSE PRACTITIONER

## 2024-09-04 ASSESSMENT — PAIN SCALES - GENERAL: PAINLEVEL: NO PAIN (0)

## 2024-09-04 NOTE — PROGRESS NOTES
ANTICOAGULATION  MANAGEMENT    Horacio Panda is being discharged from the [facility] Anticoagulation Management Program ([facility]).    Reason for discharge: warfarin therapy completed    Anticoagulation episode resolved and Standing order discontinued    If patient needs warfarin management in the future, please send a new referral    Elayne Gonzales RN

## 2024-09-04 NOTE — PROGRESS NOTES
ELECTROPHYSIOLOGY CLINIC VISIT    Assessment/Recommendations   Assessment/Plan:    Mr. Panda is a 59 year old male who has a medical history significant for PFO s/p percutaneous closure 6/2023, CVA 4/2023, HTN, HLD, and arthritis.     Paroxsymal Atrial Tachycardia / Possible Atrial Fibrillation:   1. Stroke Prophylaxis:  CHADSVASC=++CVA, +HTN  3, corresponding to a 3.2% annual stroke / systemic emolism event rate.  He has been recently started on Warfarin, tolerating well thus far. Followed by Coumadin Clinic. He only had some AT post PFO closure and thus considered provoked. No further AF identified. We discussed we can stop Warfarin for now. If he develops further AF in future then would require chronic oral anticoagulation. He has a smart watch that has AF alerts on he says.   2. Rate Control: Stop Metoprolol.   3. Rhythm Control: Cardioversion, Antiarrhythmics and/or ablation are options for rhythm control. Monitoring had a day with some AT. He is not having a high burden or symptoms at this time. Will defer further rhythm control measures for now. Will extend MCOT to wear for 1 month for occult AF monitoring.  Discussed that the AT and his prior symptoms could all be provoked after percutaneous PFO closure. However, given CVA need to also consider this risk factor.  His smart Watch has not had any AF alerts and monitoring has been negative. We discussed if any further symptoms or any alerts on his Watch we would do an ILR for longerterm monitoring.   4. Risk Factor Management: Statin, BP control- monitor BPs and send in 1 month, and CARLENE evaluation as indicated.    Follow-up in 6 months or sooner if need arises.        History of Present Illness/Subjective    Mr. Horacio Panda is a 59 year old male who comes in today for EP consultation of concern for PAF.    Mr. Panda is a 59 year old male who has a medical history significant for PFO s/p percutaneous closure 6/2023, CVA 4/2023, HTN, HLD, and arthritis.     He  suffered an acute CVA of the right posterior denise in 4/2023 and work up showed a PFO by ESTELA. After discussion with stroke neurology empiric DOAC started given PFO and he was referred to structural cardiology for consideration of PFO closure. He underwent successful PFO with a 30 mm GORE cardioform on 6/16/23. Post procedure ECHO showed normal LV function without pericardial effusion. He was discharged on aspirin and Plavix, anticoagulation was discontinued.  He is having fairly frequent episodes of fluttering in his chest. Episodes can last minutes to hours. When the episodes come on, he feels very tired. He checked his vitals during one of the episodes last week and his BP was low 98/74 and HR 80. He was a little dizzy during this episode as well.  He was placed on MCOT monitor and there was concern for PAF, he was started on Warfarin, and he was referred to EP for evaluation.     EP Follow up 8/9/2023: He reports feeling well. He states he has not had any further palpitations even during time MCOT  He denies chest discomfort, palpitations, abdominal fullness/bloating or peripheral edema, shortness of breath, paroxysmal nocturnal dyspnea, orthopnea, lightheadedness, dizziness, pre-syncope, or syncope. He has only been on MCTO for a couple weeks. Thus far review of tracings show runs of AT. Presenting 12 lead ECG shows SB Vent Rate 57 bpm,  ms, QRS 90 ms, QTc 410 ms. Current cardiac medications include: Metoprolol, Losartan, Norvasc, Lipitor, ASA, and Warfarin.     EP Follow Up 10/25/2023: Patient presents today in follow up. He is feeling well; he has no fevers, chills, chest pain, SOB, abdominal pain, nausea, vomiting, or diarrhea.     He presents today for follow up. He reports feeling well. He denies chest discomfort, palpitations, abdominal fullness/bloating or peripheral edema, shortness of breath, paroxysmal nocturnal dyspnea, orthopnea, lightheadedness, dizziness, pre-syncope, or syncope. A zio patch  monitor from 7/31/24-8/14/24 showed 1 PSVT up to 4 beats and no AF noted. Presenting 12 lead ECG shows NSR Vent Rate 70 bpm,  ms, QRS 86 ms, QTc 419 ms. Current cardiac medications include: Norvasc, Lipitor, Losartan, Metoprolol, and Warfarin.     I have reviewed and updated the patient's Past Medical History, Social History, Family History and Medication List.     Cardiographics (Personally Reviewed) :   7/28/23 Echo:  Interpretation Summary  Global and regional left ventricular function is normal with an EF of 55-60%.  Global right ventricular function is normal.  No significant valvular abnormalities present.  Post PFO closure with no shunt evident on color Doppler.  Small inferior vena cava size consistent with hypovolemia.  No pericardial effusion is present.    6/16/23 Echo:   Interpretation Summary  Global and regional left ventricular function is normal with an EF of 60-65%.  Global right ventricular function is normal.  s/p PFO closure with 30mm Cardioform septal occluder device. Closure device  not well seen in these limited views.  There was no shunt at the atrial septal level as assessed by color Doppler.  No pericardial effusion is present.       Physical Examination   BP (!) 134/91 (BP Location: Right arm, Patient Position: Chair, Cuff Size: Adult Large)   Pulse 78   Wt 127.9 kg (281 lb 14.4 oz)   SpO2 97%   BMI 37.19 kg/m    Wt Readings from Last 3 Encounters:   07/16/24 127.5 kg (281 lb)   06/17/24 128.4 kg (283 lb 1.6 oz)   12/11/23 129.6 kg (285 lb 12.8 oz)     General Appearance:   Alert, well-appearing and in no acute distress.   HEENT: Atraumatic, normocephalic. PERRL.  MMM.   Chest/Lungs:   Respirations unlabored.  Lungs are clear to auscultation.   Cardiovascular:   Regular rate and rhythm.  S1/S2. No murmur.    Abdomen:  Soft, nontender, nondistended.   Extremities: No cyanosis or clubbing. No edema.    Musculoskeletal: Moves all extremities.  Gait normal.   Skin: Warm, dry, intact.     Neurologic: Mood and affect are appropriate.  Alert and oriented to person, place, time, and situation.          Medications  Allergies   Current Outpatient Medications   Medication Sig Dispense Refill    amLODIPine (NORVASC) 10 MG tablet Take 1 tablet (10 mg) by mouth daily 90 tablet 3    atorvastatin (LIPITOR) 80 MG tablet Take 1 tablet (80 mg) by mouth daily 90 tablet 3    losartan (COZAAR) 50 MG tablet Take 1 tablet (50 mg) by mouth daily 90 tablet 3    metoprolol tartrate (LOPRESSOR) 25 MG tablet Take 1 tablet (25 mg) by mouth 2 times daily 180 tablet 3    omeprazole (PRILOSEC) 20 MG DR capsule Take 1 Capsule by mouth daily for 90 days. Take 1 hour before a meal. 90 capsule 3    warfarin ANTICOAGULANT (COUMADIN) 5 MG tablet Take 7.5 mg Mon, Fri and 10 mg all other days or as directed by the coumadin clinic. 180 tablet 1    No Known Allergies      Lab Results (Personally Reviewed)    Chemistry/lipid CBC Cardiac Enzymes/BNP/TSH/INR   Lab Results   Component Value Date    BUN 14.0 06/10/2024     06/10/2024    CO2 25 06/10/2024     Creatinine   Date Value Ref Range Status   06/10/2024 1.14 0.67 - 1.17 mg/dL Final   03/27/2018 1.11 0.66 - 1.25 mg/dL Final       Lab Results   Component Value Date    CHOL 156 06/10/2024    HDL 38 (L) 06/10/2024    LDL 73 06/10/2024      Lab Results   Component Value Date    WBC 7.8 06/10/2024    HGB 16.0 06/10/2024    HCT 46.0 06/10/2024    MCV 94 06/10/2024     06/10/2024    Lab Results   Component Value Date    INR 2.95 (H) 07/17/2024        The patient states understanding and is agreeable with the plan.   KALINA Smith CNP  Electrophysiology Consult Service  Securely message with [employer]   Text page via [email or web address] Paging/Directory

## 2024-09-04 NOTE — LETTER
9/4/2024      RE: Horacio Panda  [address] [address] [facility] [place] [address]       Dear Colleague,    Thank you for the opportunity to participate in the care of your patient, Horacio Panda, at the [facility] [place] at [facility]. Please see a copy of my visit note below.        ELECTROPHYSIOLOGY CLINIC VISIT    Assessment/Recommendations   Assessment/Plan:    Mr. Panda is a 59 year old male who has a medical history significant for PFO s/p percutaneous closure 6/2023, CVA 4/2023, HTN, HLD, and arthritis.     Paroxsymal Atrial Tachycardia / Possible Atrial Fibrillation:   1. Stroke Prophylaxis:  CHADSVASC=++CVA, +HTN  3, corresponding to a 3.2% annual stroke / systemic emolism event rate.  He has been recently started on Warfarin, tolerating well thus far. Followed by Coumadin Clinic. He only had some AT post PFO closure and thus considered provoked. No further AF identified. We discussed we can stop Warfarin for now. If he develops further AF in future then would require chronic oral anticoagulation. He has a smart watch that has AF alerts on he says.   2. Rate Control: Stop Metoprolol.   3. Rhythm Control: Cardioversion, Antiarrhythmics and/or ablation are options for rhythm control. Monitoring had a day with some AT. He is not having a high burden or symptoms at this time. Will defer further rhythm control measures for now. Will extend MCOT to wear for 1 month for occult AF monitoring.  Discussed that the AT and his prior symptoms could all be provoked after percutaneous PFO closure. However, given CVA need to also consider this risk factor.  His smart Watch has not had any AF alerts and monitoring has been negative. We discussed if any further symptoms or any alerts on his Watch we would do an ILR for longerterm monitoring.   4. Risk Factor Management: Statin, BP control- monitor BPs and send in 1 month, and CARLENE evaluation as indicated.    Follow-up  in 6 months or sooner if need arises.        History of Present Illness/Subjective    Mr. Horacio Panda is a 59 year old male who comes in today for EP consultation of concern for PAF.    Mr. Panda is a 59 year old male who has a medical history significant for PFO s/p percutaneous closure 6/2023, CVA 4/2023, HTN, HLD, and arthritis.     He suffered an acute CVA of the right posterior denise in 4/2023 and work up showed a PFO by ESTELA. After discussion with stroke neurology empiric DOAC started given PFO and he was referred to structural cardiology for consideration of PFO closure. He underwent successful PFO with a 30 mm GORE cardioform on 6/16/23. Post procedure ECHO showed normal LV function without pericardial effusion. He was discharged on aspirin and Plavix, anticoagulation was discontinued.  He is having fairly frequent episodes of fluttering in his chest. Episodes can last minutes to hours. When the episodes come on, he feels very tired. He checked his vitals during one of the episodes last week and his BP was low 98/74 and HR 80. He was a little dizzy during this episode as well.  He was placed on MCOT monitor and there was concern for PAF, he was started on Warfarin, and he was referred to EP for evaluation.     EP Follow up 8/9/2023: He reports feeling well. He states he has not had any further palpitations even during time MCOT  He denies chest discomfort, palpitations, abdominal fullness/bloating or peripheral edema, shortness of breath, paroxysmal nocturnal dyspnea, orthopnea, lightheadedness, dizziness, pre-syncope, or syncope. He has only been on MCTO for a couple weeks. Thus far review of tracings show runs of AT. Presenting 12 lead ECG shows SB Vent Rate 57 bpm,  ms, QRS 90 ms, QTc 410 ms. Current cardiac medications include: Metoprolol, Losartan, Norvasc, Lipitor, ASA, and Warfarin.     EP Follow Up 10/25/2023: Patient presents today in follow up. He is feeling well; he has no fevers, chills, chest  pain, SOB, abdominal pain, nausea, vomiting, or diarrhea.     He presents today for follow up. He reports feeling well. He denies chest discomfort, palpitations, abdominal fullness/bloating or peripheral edema, shortness of breath, paroxysmal nocturnal dyspnea, orthopnea, lightheadedness, dizziness, pre-syncope, or syncope. A zio patch monitor from 7/31/24-8/14/24 showed 1 PSVT up to 4 beats and no AF noted. Presenting 12 lead ECG shows NSR Vent Rate 70 bpm,  ms, QRS 86 ms, QTc 419 ms. Current cardiac medications include: Norvasc, Lipitor, Losartan, Metoprolol, and Warfarin.     I have reviewed and updated the patient's Past Medical History, Social History, Family History and Medication List.     Cardiographics (Personally Reviewed) :   7/28/23 Echo:  Interpretation Summary  Global and regional left ventricular function is normal with an EF of 55-60%.  Global right ventricular function is normal.  No significant valvular abnormalities present.  Post PFO closure with no shunt evident on color Doppler.  Small inferior vena cava size consistent with hypovolemia.  No pericardial effusion is present.    6/16/23 Echo:   Interpretation Summary  Global and regional left ventricular function is normal with an EF of 60-65%.  Global right ventricular function is normal.  s/p PFO closure with 30mm Cardioform septal occluder device. Closure device  not well seen in these limited views.  There was no shunt at the atrial septal level as assessed by color Doppler.  No pericardial effusion is present.       Physical Examination   BP (!) 134/91 (BP Location: Right arm, Patient Position: Chair, Cuff Size: Adult Large)   Pulse 78   Wt 127.9 kg (281 lb 14.4 oz)   SpO2 97%   BMI 37.19 kg/m    Wt Readings from Last 3 Encounters:   07/16/24 127.5 kg (281 lb)   06/17/24 128.4 kg (283 lb 1.6 oz)   12/11/23 129.6 kg (285 lb 12.8 oz)     General Appearance:   Alert, well-appearing and in no acute distress.   HEENT: Atraumatic,  normocephalic. PERRL.  MMM.   Chest/Lungs:   Respirations unlabored.  Lungs are clear to auscultation.   Cardiovascular:   Regular rate and rhythm.  S1/S2. No murmur.    Abdomen:  Soft, nontender, nondistended.   Extremities: No cyanosis or clubbing. No edema.    Musculoskeletal: Moves all extremities.  Gait normal.   Skin: Warm, dry, intact.    Neurologic: Mood and affect are appropriate.  Alert and oriented to person, place, time, and situation.          Medications  Allergies   Current Outpatient Medications   Medication Sig Dispense Refill     amLODIPine (NORVASC) 10 MG tablet Take 1 tablet (10 mg) by mouth daily 90 tablet 3     atorvastatin (LIPITOR) 80 MG tablet Take 1 tablet (80 mg) by mouth daily 90 tablet 3     losartan (COZAAR) 50 MG tablet Take 1 tablet (50 mg) by mouth daily 90 tablet 3     metoprolol tartrate (LOPRESSOR) 25 MG tablet Take 1 tablet (25 mg) by mouth 2 times daily 180 tablet 3     omeprazole (PRILOSEC) 20 MG DR capsule Take 1 Capsule by mouth daily for 90 days. Take 1 hour before a meal. 90 capsule 3     warfarin ANTICOAGULANT (COUMADIN) 5 MG tablet Take 7.5 mg Mon, Fri and 10 mg all other days or as directed by the coumadin clinic. 180 tablet 1    No Known Allergies      Lab Results (Personally Reviewed)    Chemistry/lipid CBC Cardiac Enzymes/BNP/TSH/INR   Lab Results   Component Value Date    BUN 14.0 06/10/2024     06/10/2024    CO2 25 06/10/2024     Creatinine   Date Value Ref Range Status   06/10/2024 1.14 0.67 - 1.17 mg/dL Final   03/27/2018 1.11 0.66 - 1.25 mg/dL Final       Lab Results   Component Value Date    CHOL 156 06/10/2024    HDL 38 (L) 06/10/2024    LDL 73 06/10/2024      Lab Results   Component Value Date    WBC 7.8 06/10/2024    HGB 16.0 06/10/2024    HCT 46.0 06/10/2024    MCV 94 06/10/2024     06/10/2024    Lab Results   Component Value Date    INR 2.95 (H) 07/17/2024        The patient states understanding and is agreeable with the plan.   Tamara Koo,  APRN CNP  Electrophysiology Consult Service  Securely message with [employer]   Text page via [facility] Paging/Directory                      Please do not hesitate to contact me if you have any questions/concerns.     Sincerely,     KALINA Teixeira CNP

## 2024-09-08 LAB
ATRIAL RATE - MUSE: 70 BPM
DIASTOLIC BLOOD PRESSURE - MUSE: NORMAL MMHG
INTERPRETATION ECG - MUSE: NORMAL
P AXIS - MUSE: 35 DEGREES
PR INTERVAL - MUSE: 194 MS
QRS DURATION - MUSE: 86 MS
QT - MUSE: 388 MS
QTC - MUSE: 419 MS
R AXIS - MUSE: 3 DEGREES
SYSTOLIC BLOOD PRESSURE - MUSE: NORMAL MMHG
T AXIS - MUSE: 12 DEGREES
VENTRICULAR RATE- MUSE: 70 BPM

## 2024-09-26 ENCOUNTER — MYC MEDICAL ADVICE (OUTPATIENT)
Dept: CARDIOLOGY | Facility: CLINIC | Age: 59
End: 2024-09-26
Payer: COMMERCIAL

## 2024-09-26 DIAGNOSIS — I47.19 ATRIAL TACHYCARDIA, PAROXYSMAL (H): Primary | ICD-10-CM

## 2024-09-26 RX ORDER — METOPROLOL TARTRATE 25 MG/1
25 TABLET, FILM COATED ORAL 2 TIMES DAILY
Qty: 180 TABLET | Refills: 3 | Status: SHIPPED | OUTPATIENT
Start: 2024-09-26

## 2024-11-05 ENCOUNTER — TELEPHONE (OUTPATIENT)
Dept: CARDIOLOGY | Facility: CLINIC | Age: 59
End: 2024-11-05
Payer: COMMERCIAL

## 2024-11-05 NOTE — TELEPHONE ENCOUNTER
Patient Contacted for the patient to call back and schedule the following:    Appointment type: RTN EP  Provider: KIZZY BECKER  Return date: 03/07/2025  Specialty phone number: [phone] OPT 1  Additional appointment(s) needed: N/A  Additonal Notes: N/A    No

## 2024-12-03 ENCOUNTER — TELEPHONE (OUTPATIENT)
Dept: FAMILY MEDICINE | Facility: CLINIC | Age: 59
End: 2024-12-03
Payer: COMMERCIAL

## 2024-12-03 NOTE — TELEPHONE ENCOUNTER
Patient Quality Outreach    Patient is due for the following:   Hypertension -  BP check  Physical Preventive Adult Physical    Action(s) Taken:   Schedule a Adult Preventative    Type of outreach:    Sent [employer] message.    Questions for provider review:    None           Tamie Dumont MA  Chart routed to Care Team.     (E4) spontaneous

## 2025-03-23 NOTE — PROGRESS NOTES
ANTICOAGULATION MANAGEMENT     Horacio Panda 58 year old male is on warfarin with therapeutic INR result. (Goal INR 2.0-3.0)    Recent labs: (last 7 days)     03/14/24  0958   INR 2.44*       ASSESSMENT     Source(s): Chart Review  Previous INR was Therapeutic last 2(+) visits  Medication, diet, health changes since last INR chart reviewed; none identified         PLAN     Recommended plan for no diet, medication or health factor changes affecting INR     Dosing Instructions: Continue your current warfarin dose with next INR in 3 weeks       Summary  As of 3/14/2024      Full warfarin instructions:  7.5 mg every Mon, Fri; 10 mg all other days   Next INR check:  4/4/2024               Detailed voice message left for Horacio with dosing instructions and follow up date.     Contact [phone]  to schedule and with any changes, questions or concerns.     Education provided:   Please call back if any changes to your diet, medications or how you've been taking warfarin    Plan made per [facility] anticoagulation protocol    Bro Smith RN  Anticoagulation Clinic  3/14/2024    _______________________________________________________________________     Anticoagulation Episode Summary       Current INR goal:  2.0-3.0   TTR:  63.3% (2.1 mo)   Target end date:  Indefinite   Send INR reminders to:  [facility]    Indications    Paroxysmal atrial fibrillation (H) [I48.0]  PFO (patent foramen ovale) [Q21.12]  Atrial fibrillation  unspecified type (H) [I48.91]             Comments:               Anticoagulation Care Providers       Provider Role Specialty Phone number    Frances Pantoja NP Referring Cardiovascular Disease [phone]    OppeОлег ley PA-C Referring Family Medicine [phone]               normal for race

## 2025-03-25 ENCOUNTER — PATIENT OUTREACH (OUTPATIENT)
Dept: FAMILY MEDICINE | Facility: CLINIC | Age: 60
End: 2025-03-25
Payer: COMMERCIAL

## 2025-03-25 NOTE — TELEPHONE ENCOUNTER
Patient Quality Outreach    Patient is due for the following:   Hypertension -  BP check    Action(s) Taken:   Schedule a nurse only visit for bp check    Type of outreach:    Sent [employer] message.    Questions for provider review:    None         Margaret Swan MA.

## 2025-03-26 ENCOUNTER — TELEPHONE (OUTPATIENT)
Dept: CARDIOLOGY | Facility: CLINIC | Age: 60
End: 2025-03-26
Payer: COMMERCIAL

## 2025-03-26 NOTE — TELEPHONE ENCOUNTER
Prior Authorization Retail Medication Request    Medication/Dose: omeprazole (PRILOSEC) 20 MG DR capsule  Diagnosis and ICD code (if different than what is on RX):    PFO (patent foramen ovale) [Q21.12]      Paroxysmal atrial fibrillation (H) [I48.0]      Gastroesophageal reflux disease without esophagitis [K21.9]        New/renewal/insurance change PA/secondary ins. PA:  Previously Tried and Failed:    Rationale:      Insurance   Primary: [employer] [place]   Insurance ID:  [id number]     Secondary (if applicable):  Insurance ID:      Pharmacy Information (if different than what is on RX)  Name:    [id number] - [place], [place] - [address] [facility]     Phone:  [phone]   Fax: [phone]    Clinic Information  Preferred routing pool for dept communication:

## 2025-03-27 NOTE — TELEPHONE ENCOUNTER
Unable to complete a PA at this time for Omeprazole 20mg.     Called [facility] pharmacy on Rx [phone] to inquire about current insurance information.     [facility] pharmacy hasn't processed this medication for him since May of 2024 and do not have any active pharmacy benefits on file, they did not request a PA to be done.  [facility] [place] does not come up as active for me either.     If patient has updated insurance information he may call the pharmacy with his new pharmacy benefits.

## (undated) DEVICE — CATH TRANSSEPTAL VERSACROSS 45D 63X230CM VXSK0032

## (undated) DEVICE — PACK HEART LEFT CUSTOM

## (undated) DEVICE — WIRE GUIDE 0.035"X260CM TERUMO GLIDEWIRE STR GR3504

## (undated) DEVICE — CATH US 8FR -90 ACUNAV INTVASC

## (undated) DEVICE — MANIFOLD KIT ANGIO AUTOMATED 014613

## (undated) DEVICE — KIT HAND CONTROL ACIST 016795

## (undated) DEVICE — INTRO SHEATH 9FRX25CM PINNACLE RSS906

## (undated) DEVICE — CATH ANGIO 6FR MPA2 2 SH 100CM

## (undated) DEVICE — CLOSURE DEVICE 6FR VASC PROGLIDE MEDICATED SUTURE 12673-03

## (undated) DEVICE — CATH ANGIO SUPERTORQUE PLUS JR4 6FRX100CM 533621

## (undated) DEVICE — WIRE GUIDE 0.035"X260CM AMPTLAZ XSTIFF CVD THSCF-35-260-3-A

## (undated) DEVICE — CATH ANGIO WEDGE PRESSURE 6FRX110CM DL AI-07126

## (undated) DEVICE — INTRO SHEATH 6FRX25CM PINNACLE RSS606

## (undated) DEVICE — SHEATH INTRODUCER DRYSEAL FLEX W/HYDRO CT 12FRX33CM DSF1233

## (undated) RX ORDER — FENTANYL CITRATE 50 UG/ML
INJECTION, SOLUTION INTRAMUSCULAR; INTRAVENOUS
Status: DISPENSED
Start: 2023-06-16

## (undated) RX ORDER — ASPIRIN 81 MG/1
TABLET, CHEWABLE ORAL
Status: DISPENSED
Start: 2023-06-16

## (undated) RX ORDER — SODIUM CHLORIDE 9 MG/ML
INJECTION, SOLUTION INTRAVENOUS
Status: DISPENSED
Start: 2023-06-16

## (undated) RX ORDER — DIPHENHYDRAMINE HYDROCHLORIDE 50 MG/ML
INJECTION INTRAMUSCULAR; INTRAVENOUS
Status: DISPENSED
Start: 2023-06-16

## (undated) RX ORDER — CLOPIDOGREL BISULFATE 75 MG/1
TABLET ORAL
Status: DISPENSED
Start: 2023-06-16

## (undated) RX ORDER — LIDOCAINE HYDROCHLORIDE 10 MG/ML
INJECTION, SOLUTION EPIDURAL; INFILTRATION; INTRACAUDAL; PERINEURAL
Status: DISPENSED
Start: 2023-06-16

## (undated) RX ORDER — HEPARIN SODIUM 1000 [USP'U]/ML
INJECTION, SOLUTION INTRAVENOUS; SUBCUTANEOUS
Status: DISPENSED
Start: 2023-06-16

## (undated) RX ORDER — PROTAMINE SULFATE 10 MG/ML
INJECTION, SOLUTION INTRAVENOUS
Status: DISPENSED
Start: 2023-06-16